# Patient Record
Sex: MALE | Race: BLACK OR AFRICAN AMERICAN | Employment: UNEMPLOYED | URBAN - METROPOLITAN AREA
[De-identification: names, ages, dates, MRNs, and addresses within clinical notes are randomized per-mention and may not be internally consistent; named-entity substitution may affect disease eponyms.]

---

## 2017-08-02 DIAGNOSIS — I10 HTN (HYPERTENSION): ICD-10-CM

## 2017-08-08 RX ORDER — ISOSORBIDE MONONITRATE 60 MG/1
60 TABLET, EXTENDED RELEASE ORAL 2 TIMES DAILY
Qty: 180 TABLET | Refills: 3 | Status: SHIPPED | OUTPATIENT
Start: 2017-08-08 | End: 2018-01-01

## 2017-09-07 ENCOUNTER — CARE COORDINATION (OUTPATIENT)
Dept: CARDIOLOGY | Facility: CLINIC | Age: 57
End: 2017-09-07

## 2017-09-12 ENCOUNTER — TELEPHONE (OUTPATIENT)
Dept: CARDIOLOGY | Facility: CLINIC | Age: 57
End: 2017-09-12

## 2017-09-12 DIAGNOSIS — I10 ESSENTIAL HYPERTENSION: Primary | ICD-10-CM

## 2017-09-12 DIAGNOSIS — E78.5 HYPERLIPIDEMIA LDL GOAL <100: ICD-10-CM

## 2017-09-12 NOTE — TELEPHONE ENCOUNTER
Patient started your nurse was supposed to put in orders for labs for him to do before his appointment with you.  He would like to get them done soon.     Please contact the patient and let him know when the orders are placed in his chart.  Thank you!  Leena MORA  Central Scheduler

## 2017-09-19 ENCOUNTER — PRE VISIT (OUTPATIENT)
Dept: CARDIOLOGY | Facility: CLINIC | Age: 57
End: 2017-09-19

## 2017-09-19 ENCOUNTER — NURSE TRIAGE (OUTPATIENT)
Dept: NURSING | Facility: CLINIC | Age: 57
End: 2017-09-19

## 2017-09-19 DIAGNOSIS — E78.5 HYPERLIPIDEMIA LDL GOAL <100: ICD-10-CM

## 2017-09-19 DIAGNOSIS — I10 ESSENTIAL HYPERTENSION: ICD-10-CM

## 2017-09-19 LAB
ANION GAP SERPL CALCULATED.3IONS-SCNC: 15 MMOL/L (ref 3–14)
BUN SERPL-MCNC: 57 MG/DL (ref 7–30)
CALCIUM SERPL-MCNC: 7.2 MG/DL (ref 8.5–10.1)
CHLORIDE SERPL-SCNC: 102 MMOL/L (ref 94–109)
CHOLEST SERPL-MCNC: 165 MG/DL
CO2 SERPL-SCNC: 23 MMOL/L (ref 20–32)
CREAT SERPL-MCNC: 17.3 MG/DL (ref 0.66–1.25)
GFR SERPL CREATININE-BSD FRML MDRD: 3 ML/MIN/1.7M2
GLUCOSE SERPL-MCNC: 93 MG/DL (ref 70–99)
HDLC SERPL-MCNC: 36 MG/DL
LDLC SERPL CALC-MCNC: 85 MG/DL
NONHDLC SERPL-MCNC: 129 MG/DL
POTASSIUM SERPL-SCNC: 3.7 MMOL/L (ref 3.4–5.3)
SODIUM SERPL-SCNC: 140 MMOL/L (ref 133–144)
TRIGL SERPL-MCNC: 222 MG/DL

## 2017-09-19 PROCEDURE — 80048 BASIC METABOLIC PNL TOTAL CA: CPT | Performed by: NURSE PRACTITIONER

## 2017-09-19 PROCEDURE — 80061 LIPID PANEL: CPT | Performed by: NURSE PRACTITIONER

## 2017-09-19 PROCEDURE — 36415 COLL VENOUS BLD VENIPUNCTURE: CPT | Performed by: NURSE PRACTITIONER

## 2017-09-19 NOTE — TELEPHONE ENCOUNTER
Labs : 9/20/17  Prior to appt.  BMP/ Lipid profile.    Dobutamine stress echo : 1/18/16  Normal dobutamine echo     No angina was elicited. Heart rate and blood pressure response were adequate.  There were no wall motion abnormalities at rest or stress. LVEF mata from 60 to 70% and LV size decreased     Concentric LVH noted

## 2017-09-19 NOTE — TELEPHONE ENCOUNTER
Ron from [facility] calling to inform ordering provider of Critical lab result.  Patient with creatinine level of 17.3.  Writer attempted to page ordering provider Natalie Rodas NP via telephone pager at [phone].  Additionally, Smart Web text page was sent to [facility] on call cardiology fellow at 1743. Received call back from Manuel Moore on call cardiology fellow at 1747.  No further orders/instructions were given to writer at this time.    Milagros Alcazar RN  [facility]

## 2017-09-20 ENCOUNTER — OFFICE VISIT (OUTPATIENT)
Dept: CARDIOLOGY | Facility: CLINIC | Age: 57
End: 2017-09-20
Attending: NURSE PRACTITIONER
Payer: MEDICAID

## 2017-09-20 VITALS
WEIGHT: 217.8 LBS | OXYGEN SATURATION: 100 % | HEIGHT: 68 IN | SYSTOLIC BLOOD PRESSURE: 135 MMHG | BODY MASS INDEX: 33.01 KG/M2 | DIASTOLIC BLOOD PRESSURE: 85 MMHG | HEART RATE: 75 BPM

## 2017-09-20 DIAGNOSIS — R06.02 SHORTNESS OF BREATH: ICD-10-CM

## 2017-09-20 DIAGNOSIS — R05.9 COUGH: ICD-10-CM

## 2017-09-20 DIAGNOSIS — I15.1 HYPERTENSION SECONDARY TO OTHER RENAL DISORDERS: ICD-10-CM

## 2017-09-20 DIAGNOSIS — E78.1 HYPERTRIGLYCERIDEMIA: Primary | ICD-10-CM

## 2017-09-20 PROCEDURE — 99212 OFFICE O/P EST SF 10 MIN: CPT | Mod: ZF

## 2017-09-20 PROCEDURE — 99213 OFFICE O/P EST LOW 20 MIN: CPT | Mod: ZP | Performed by: NURSE PRACTITIONER

## 2017-09-20 RX ORDER — CARVEDILOL 12.5 MG/1
25 TABLET ORAL DAILY
Qty: 180 TABLET | Refills: 3 | COMMUNITY
Start: 2017-09-20

## 2017-09-20 RX ORDER — TRIAMCINOLONE ACETONIDE 0.25 MG/G
CREAM TOPICAL 2 TIMES DAILY
COMMUNITY
End: 2018-07-06

## 2017-09-20 RX ORDER — LANOLIN ALCOHOL/MO/W.PET/CERES
500 CREAM (GRAM) TOPICAL AT BEDTIME
Refills: 3 | COMMUNITY
Start: 2017-09-20

## 2017-09-20 RX ORDER — CICLOPIROX 80 MG/ML
SOLUTION TOPICAL
COMMUNITY

## 2017-09-20 ASSESSMENT — PAIN SCALES - GENERAL: PAINLEVEL: NO PAIN (0)

## 2017-09-20 NOTE — PROGRESS NOTES
Cardiology Clinic Note  September 20, 2017      HPI:  Rommel Fan is a 57 year old with a past medical history significant for hypertension,  dyslipidemia/hypertriglyceridemia and end-stage renal disease on peritoneal dialysis for the past year who presents for follow-up.     Mr. Fan is feeling well. He is not particularly active due to arthritis in his back and hips. He is able to walk at least 1 mile without chest pain, shortness of breath, palpitations, lightheadedness or syncope. He endorses stable 3 pillow orthopnea. He continues to have occasional leg cramps at rest and with activity. He recently underwent ABIs which were normal. He is currently taking Niacin and Zetia, no longer taking Lovaza. He tries to eat a heart healthy diet but states that he could do better. He uses CPAP for his MAGALIS.     Past medical history:  Past Medical History:   Diagnosis Date     Asthma      Chronic renal failure      COPD (chronic obstructive pulmonary disease) (H)      Gastrointestinal problem      Hepatitis C      Hyperlipidemia LDL goal <100 4/3/2013     Hypertension      Hypertriglyceridemia 7/13/2015     Liver disease      Obstructive sleep apnea      Sinus problem      Medications:    Current Outpatient Prescriptions on File Prior to Visit:  aspirin EC 81 MG tablet Take 1 tablet (81 mg) by mouth daily   isosorbide mononitrate (IMDUR) 60 MG 24 hr tablet Take 1 tablet (60 mg) by mouth 2 times daily DO NOT TAKE WITH SILDENAFIL (viagra)   Peritoneal Dialysis Solutions (DIANEAL PD-2.5%, CALCIUM 3.5 MEQ/L,) 396 MOSM/L SOLN 1,000 mLs by Dialysis route once for 1 dose   senna-docusate (YASMINE-COLACE) 8.6-50 MG per tablet Take 1-2 tablets by mouth 2 times daily as needed for constipation   Nutritional Supplements (NEPRO) LIQD Take 1 Can by mouth daily   [DISCONTINUED] carvedilol (COREG) 12.5 MG tablet Take 1 tablet (12.5 mg) by mouth 2 times daily (with meals)   albuterol (PROAIR HFA, PROVENTIL HFA, VENTOLIN HFA) 108 (90  BASE) MCG/ACT inhaler Inhale 2 puffs into the lungs every 6 hours as needed    Varenicline Tartrate (CHANTIX PO) Take 1 mg by mouth 2 times daily as needed    Colchicine (COLCRYS PO) Take 0.6 mg by mouth 2 times daily   Cholestyramine (QUESTRAN PO) Take by mouth 3 times daily   Budesonide-Formoterol Fumarate (SYMBICORT IN) Inhale 2 puffs into the lungs 2 times daily    Cholecalciferol (VITAMIN D3 PO) Take by mouth daily   Ezetimibe (ZETIA PO) Take 10 mg by mouth   Lidocaine HCl 2 % CREA Externally apply 1 dose topically daily   losartan (COZAAR) 100 MG tablet Take 1 tablet by mouth daily.   hydrALAZINE (APRESOLINE) 100 MG TABS Take 1 tablet by mouth 3 times daily.   amLODIPine (NORVASC) 10 MG tablet Take 1 tablet by mouth daily.   omeprazole (PRILOSEC) 40 MG capsule Take 1 capsule by mouth daily.     Current Facility-Administered Medications on File Prior to Visit:  hydrALAZINE (APRESOLINE) injection       Allergies:    No Known Allergies    Family and social history:    Family History   Problem Relation Age of Onset     Asthma Mother      DIABETES Mother      Hypertension Mother      CEREBROVASCULAR DISEASE Mother      CANCER Mother      Alcohol/Drug Mother      HEART DISEASE Mother      Asthma Sister      Asthma Sister      Asthma Sister      Asthma Brother      Asthma Brother      Asthma Brother      HEART DISEASE Sister      DIABETES Sister        Social History     Social History     Marital status: Single     Spouse name: Leonela     Number of children: 1     Years of education: N/A     Occupational History      Self Employed.     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  Respiratory: No cough or hemoptysis.  Cardiovascular: See HPI.    Gastrointestinal: No abdominal pain, nausea, vomiting, hematemesis or melena.  Genitourinary: No increased frequency or urgency of urine. No dysuria or hematuria.  Musculoskeletal: No polyarthralgia or myalgias.  Neurologic: No headaches, seizure or focal weakness.  Psychiatric: No hallucinations.  Hematologic/Lymphatic/Immunologic: No bleeding tendency.  Endocrine: No heat or cold intolerance, abnormal facial hair or alopecia.    Vital signs:  /85 (BP Location: Left arm, Patient Position: Chair, Cuff Size: Adult Regular)  Pulse 75  Ht 1.727 m (5' 8\")  Wt 98.8 kg (217 lb 12.8 oz)  SpO2 100%  BMI 33.12 kg/m2   Wt Readings from Last 2 Encounters:   09/20/17 98.8 kg (217 lb 12.8 oz)   04/07/16 103.6 kg (228 lb 8 oz)     Physical Exam:  Gen: NAD.    HEENT: No conjunctival pallor or scleral icterus, MMM. Clear oropharynx.    Neck: No JVD. No thyroid enlargement or cervical adenopathy.    Chest: Clear to auscultation bilaterally.    CV: Normal first and second heart sounds. No murmurs or gallop appreciated.    Abdomen: Soft, non-tender, non-distended, BS+.  Ext: Trace edema. Warm and well perfused with normal capillary refill.    Skin: No skin rash or ulcers.  Neuro: alert, oriented and appropriately conversant.    Psych: Normal affect and speech.    Labs:  Recent Labs   Lab Test  09/19/17   0952  04/07/16   1159   06/29/15   0957  11/14/13   0831   CHOL  165  201*   < >  215*  174   HDL  36*  25*   < >  27*  34*   LDL  85  Cannot estimate LDL when triglyceride exceeds 400 mg/dL  72   < >  Cannot estimate LDL when triglyceride exceeds 400 mg/dL  71  Cannot estimate LDL when triglyceride exceeds 400 mg/dL  55   TRIG  222*  528*   < >  779*  517*   CHOLHDLRATIO   --    --    --   8.0*  5.1*    < > = values in this interval not displayed.     Diagnostics:    Dobutamine stress echo " 1/2016:    Interpretation Summary  Normal dobutamine echo     No angina was elicited. Heart rate and blood pressure response were adequate.  There were no wall motion abnormalities at rest or stress. LVEF mata from 60  to 70% and LV size decreased     Concentric LVH noted  ______________________________________________________________________________     Stress  The maximum dose of dobutamine was 50mcg/kg/min.  The maximum dose of atropine was 1mg.  The maximum dose of metoprolol was 4mg.  Patient was given 6ml mixture of 1.5ml Definity and 8.5ml saline.  normal BP response to dobutamine. SOB with infusion.  The drug infusion was stopped due to target heart rate achieved.  The EKG portion of this stress test was negative for inducible ischemia (see  echo results below).  This was a normal stress echocardiogram.     Baseline  Normal baseline electrocardiogram.  The patient is in normal sinus rhythm.  Occasional premature atrial contractions.  The resting phase of the study was normal.  No regional wall motion abnormalities noted.  There is concentric left ventricular hypoertrophy.  Stress Results                                       Maximum Predicted HR:  166 bpm            Target HR: 141 bpm        % Maximum Predicted HR:  86 %                      +--------+--------+----------+------+----+                   :        :Duration:Heart Rate:      :    :                   :  Stage : (mm:ss):   (bpm)  :  BPDos   e:                   +--------+--------+----------+------+----+                   :BASELINE:  0:00 71          :151/98:0.00:                   +--------+--------+----------+------+----+                   :  PEAK  :  12:00 1     42   :174/88:1.00:                   +--------+--------+----------+------+----+                        Stress Duration:  12:00 mm:ss *                    Maximum Stress HR:   142 bpm *        Left Ventricle  The left ventricle is normal in size. There is mild to moderate concentric  left  ventricular hypertrophy. The visual ejection fraction is estimated at 60  -65%.     Right Ventricle  The right ventricle is normal in structure, function and size.     Atria  Normal left atrial size.     Mitral Valve  The mitral valve is normal in structure and function.     Tricuspid Valve  The tricuspid valve is normal in structure and function.  Aortic Valve  The aortic valve is normal in structure and function.     Pericardium  There is no pericardial effusion.     Procedure  Dobutamine Echo Complete. Contrast Optison.    Assessment and Plan:  Rommel Fan is a 57 year old with a past medical history significant for hypertension,  dyslipidemia/hypertriglyceridemia and end-stage renal disease on peritoneal dialysis for the past year who presents for follow-up. Feeling well without cardiac symptoms. He is euvolemic today. No other significant exam findings.      1. Hypertension: Blood pressure well controlled. 130's/80's today, run in this range at home.  -- Continue losartan 100 mg daily, hydralazine 100 mg TID, amlodipine 10 mg daily and carvedilol 25 mg BID  2. Dyslipidemia: Triglycerides and total cholesterol have come down. HDL has increased. LDL 85 today.  -- Continue Niacin  mg daily and Zetia 10 mg daily  -- Continue to eat a heart healthy diet  -- Increase activity level as able    Follow-up: RTC to see Dr. Lloyd in 6 month.s

## 2017-09-20 NOTE — NURSING NOTE
Chief Complaint   Patient presents with     New Patient     58 y/o male for annual follow up of hypertension and hyperlipidemia.     Vitals were taken and medications were reconciled.    Adriane Mireles MA    1:59 PM

## 2017-09-20 NOTE — LETTER
9/20/2017      RE: Rommel Fan  [address] [place] [address]       Dear Colleague,    Thank you for the opportunity to participate in the care of your patient, Rommel Fan, at the [facility] HEART [facility] at [facility]. Please see a copy of my visit note below.    Cardiology Clinic Note  September 20, 2017      HPI:  Rommel Fan is a 57 year old with a past medical history significant for hypertension,  dyslipidemia/hypertriglyceridemia and end-stage renal disease on peritoneal dialysis for the past year who presents for follow-up.     Mr. Fan is feeling well. He is not particularly active due to arthritis in his back and hips. He is able to walk at least 1 mile without chest pain, shortness of breath, palpitations, lightheadedness or syncope. He endorses stable 3 pillow orthopnea. He continues to have occasional leg cramps at rest and with activity. He recently underwent ABIs which were normal. He is currently taking Niacin and Zetia, no longer taking Lovaza. He tries to eat a heart healthy diet but states that he could do better. He uses CPAP for his MAGALIS.     Past medical history:  Past Medical History:   Diagnosis Date     Asthma      Chronic renal failure      COPD (chronic obstructive pulmonary disease) (H)      Gastrointestinal problem      Hepatitis C      Hyperlipidemia LDL goal <100 4/3/2013     Hypertension      Hypertriglyceridemia 7/13/2015     Liver disease      Obstructive sleep apnea      Sinus problem      Medications:    Current Outpatient Prescriptions on File Prior to Visit:  aspirin EC 81 MG tablet Take 1 tablet (81 mg) by mouth daily   isosorbide mononitrate (IMDUR) 60 MG 24 hr tablet Take 1 tablet (60 mg) by mouth 2 times daily DO NOT TAKE WITH SILDENAFIL (viagra)   Peritoneal Dialysis Solutions (DIANEAL PD-2.5%, CALCIUM 3.5 MEQ/L,) 396 MOSM/L SOLN 1,000 mLs by Dialysis route once for 1 dose   senna-docusate (YASMINE-COLACE) 8.6-50 MG per  tablet Take 1-2 tablets by mouth 2 times daily as needed for constipation   Nutritional Supplements (NEPRO) LIQD Take 1 Can by mouth daily   [DISCONTINUED] carvedilol (COREG) 12.5 MG tablet Take 1 tablet (12.5 mg) by mouth 2 times daily (with meals)   albuterol (PROAIR HFA, PROVENTIL HFA, VENTOLIN HFA) 108 (90 BASE) MCG/ACT inhaler Inhale 2 puffs into the lungs every 6 hours as needed    Varenicline Tartrate (CHANTIX PO) Take 1 mg by mouth 2 times daily as needed    Colchicine (COLCRYS PO) Take 0.6 mg by mouth 2 times daily   Cholestyramine (QUESTRAN PO) Take by mouth 3 times daily   Budesonide-Formoterol Fumarate (SYMBICORT IN) Inhale 2 puffs into the lungs 2 times daily    Cholecalciferol (VITAMIN D3 PO) Take by mouth daily   Ezetimibe (ZETIA PO) Take 10 mg by mouth   Lidocaine HCl 2 % CREA Externally apply 1 dose topically daily   losartan (COZAAR) 100 MG tablet Take 1 tablet by mouth daily.   hydrALAZINE (APRESOLINE) 100 MG TABS Take 1 tablet by mouth 3 times daily.   amLODIPine (NORVASC) 10 MG tablet Take 1 tablet by mouth daily.   omeprazole (PRILOSEC) 40 MG capsule Take 1 capsule by mouth daily.     Current Facility-Administered Medications on File Prior to Visit:  hydrALAZINE (APRESOLINE) injection       Allergies:    No Known Allergies    Family and social history:    Family History   Problem Relation Age of Onset     Asthma Mother      DIABETES Mother      Hypertension Mother      CEREBROVASCULAR DISEASE Mother      CANCER Mother      Alcohol/Drug Mother      HEART DISEASE Mother      Asthma Sister      Asthma Sister      Asthma Sister      Asthma Brother      Asthma Brother      Asthma Brother      HEART DISEASE Sister      DIABETES Sister        Social History     Social History     Marital status: Single     Spouse name: Leonela     Number of children: 1     Years of education: N/A     Occupational History      Self Employed.     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  Respiratory: No cough or hemoptysis.  Cardiovascular: See HPI.    Gastrointestinal: No abdominal pain, nausea, vomiting, hematemesis or melena.  Genitourinary: No increased frequency or urgency of urine. No dysuria or hematuria.  Musculoskeletal: No polyarthralgia or myalgias.  Neurologic: No headaches, seizure or focal weakness.  Psychiatric: No hallucinations.  Hematologic/Lymphatic/Immunologic: No bleeding tendency.  Endocrine: No heat or cold intolerance, abnormal facial hair or alopecia.    Vital signs:  /85 (BP Location: Left arm, Patient Position: Chair, Cuff Size: Adult Regular)  Pulse 75  Ht 1.727 m (5' 8\")  Wt 98.8 kg (217 lb 12.8 oz)  SpO2 100%  BMI 33.12 kg/m2   Wt Readings from Last 2 Encounters:   09/20/17 98.8 kg (217 lb 12.8 oz)   04/07/16 103.6 kg (228 lb 8 oz)     Physical Exam:  Gen: NAD.    HEENT: No conjunctival pallor or scleral icterus, MMM. Clear oropharynx.    Neck: No JVD. No thyroid enlargement or cervical adenopathy.    Chest: Clear to auscultation bilaterally.    CV: Normal first and second heart sounds. No murmurs or gallop appreciated.    Abdomen: Soft, non-tender, non-distended, BS+.  Ext: Trace edema. Warm and well perfused with normal capillary refill.    Skin: No skin rash or ulcers.  Neuro: alert, oriented and appropriately conversant.    Psych: Normal affect and speech.    Labs:  Recent Labs   Lab Test  09/19/17   0952  04/07/16   1159   06/29/15   0957  11/14/13   0831   CHOL  165  201*   < >  215*  174   HDL  36*  25*   < >  27*  34*   LDL  85  Cannot estimate LDL when triglyceride exceeds 400 mg/dL  72 "   < >  Cannot estimate LDL when triglyceride exceeds 400 mg/dL  71  Cannot estimate LDL when triglyceride exceeds 400 mg/dL  55   TRIG  222*  528*   < >  779*  517*   CHOLHDLRATIO   --    --    --   8.0*  5.1*    < > = values in this interval not displayed.     Diagnostics:    Dobutamine stress echo 1/2016:    Interpretation Summary  Normal dobutamine echo     No angina was elicited. Heart rate and blood pressure response were adequate.  There were no wall motion abnormalities at rest or stress. LVEF mata from 60  to 70% and LV size decreased     Concentric LVH noted  ______________________________________________________________________________     Stress  The maximum dose of dobutamine was 50mcg/kg/min.  The maximum dose of atropine was 1mg.  The maximum dose of metoprolol was 4mg.  Patient was given 6ml mixture of 1.5ml Definity and 8.5ml saline.  normal BP response to dobutamine. SOB with infusion.  The drug infusion was stopped due to target heart rate achieved.  The EKG portion of this stress test was negative for inducible ischemia (see  echo results below).  This was a normal stress echocardiogram.     Baseline  Normal baseline electrocardiogram.  The patient is in normal sinus rhythm.  Occasional premature atrial contractions.  The resting phase of the study was normal.  No regional wall motion abnormalities noted.  There is concentric left ventricular hypoertrophy.  Stress Results                                       Maximum Predicted HR:  166 bpm            Target HR: 141 bpm        % Maximum Predicted HR:  86 %                      +--------+--------+----------+------+----+                   :        :Duration:Heart Rate:      :    :                   :  Stage : (mm:ss):   (bpm)  :  BPDos   e:                   +--------+--------+----------+------+----+                   :BASELINE:  0:00 71          :151/98:0.00:                   +--------+--------+----------+------+----+                   :  PEAK  :   12:00 1     42   :174/88:1.00:                   +--------+--------+----------+------+----+                        Stress Duration:  12:00 mm:ss *                    Maximum Stress HR:   142 bpm *        Left Ventricle  The left ventricle is normal in size. There is mild to moderate concentric  left ventricular hypertrophy. The visual ejection fraction is estimated at 60  -65%.     Right Ventricle  The right ventricle is normal in structure, function and size.     Atria  Normal left atrial size.     Mitral Valve  The mitral valve is normal in structure and function.     Tricuspid Valve  The tricuspid valve is normal in structure and function.  Aortic Valve  The aortic valve is normal in structure and function.     Pericardium  There is no pericardial effusion.     Procedure  Dobutamine Echo Complete. Contrast Optison.    Assessment and Plan:  Rommel Fan is a 57 year old with a past medical history significant for hypertension,  dyslipidemia/hypertriglyceridemia and end-stage renal disease on peritoneal dialysis for the past year who presents for follow-up. Feeling well without cardiac symptoms. He is euvolemic today. No other significant exam findings.      1. Hypertension: Blood pressure well controlled. 130's/80's today, run in this range at home.  -- Continue losartan 100 mg daily, hydralazine 100 mg TID, amlodipine 10 mg daily and carvedilol 25 mg BID  2. Dyslipidemia: Triglycerides and total cholesterol have come down. HDL has increased. LDL 85 today.  -- Continue Niacin  mg daily and Zetia 10 mg daily  -- Continue to eat a heart healthy diet  -- Increase activity level as able    Follow-up: RTC to see Dr. Lloyd in 6 month.s    Please do not hesitate to contact me if you have any questions/concerns.     Sincerely,     Natalie Rodas NP

## 2017-09-20 NOTE — PATIENT INSTRUCTIONS
You were seen today in the Cardiovascular Clinic at the [facility].    Cardiology provider you saw during your visit Natalie Rodas NP.    1. Your labs and vitals are good today.  2. Continue current medications.  3. Please make a follow-up appointment with Dr. Lloyd in 6 months with labs prior.    Questions and [phone].   First press #1 for the University and then press #3 for Medical Questions to reach the Cardiology triage nurse.     On Call Cardiologist for after hours or on weekends: [phone], press option #4 and ask to speak to the on-call Cardiologist.

## 2017-09-20 NOTE — MR AVS SNAPSHOT
After Visit Summary   2017    Rommel Fan    MRN: [id number]           Patient Information     Date Of Birth          1960        Visit Information        Provider Department      2017 2:00 PM Natalie Rodas NP [facility]        Today's Diagnoses     Hypertriglyceridemia    -  1    Hypertension secondary to other renal disorders        Cough        Shortness of breath          Care Instructions    You were seen today in the Cardiovascular Clinic at the [facility].    Cardiology provider you saw during your visit Natalie Rodas NP.    1. Your labs and vitals are good today.  2. Continue current medications.  3. Please make a follow-up appointment with Dr. Lloyd in 6 months with labs prior.    Questions and [phone].   First press #1 for the University and then press #3 for Medical Questions to reach the Cardiology triage nurse.     On Call Cardiologist for after hours or on weekends: [phone], press option #4 and ask to speak to the on-call Cardiologist.             Follow-ups after your visit        Follow-up notes from your care team     See patient instructions section of the AVS Return if symptoms worsen or fail to improve, for Natalie Rodas CNP.      Future tests that were ordered for you today     Open Future Orders        Priority Expected Expires Ordered    **Lipid panel reflex to direct LDL FUTURE anytime Routine 2018 3/20/2018 2017            Who to contact     If you have questions or need follow up information about today's clinic visit or your schedule please contact [facility] directly at [phone].  Normal or non-critical lab and imaging results will be communicated to you by MyChart, letter or phone within 4 business days after the clinic has received the results. If you do not hear from us within 7 days, please contact the clinic through MyChart or phone. If you have a critical or abnormal lab  "result, we will notify you by phone as soon as possible.  Submit refill requests through [employer] or call your pharmacy and they will forward the refill request to us. Please allow 3 business days for your refill to be completed.          Additional Information About Your Visit        [email or web address] Information     [employer] gives you secure access to your electronic health record. If you see a primary care provider, you can also send messages to your care team and make appointments. If you have questions, please call your primary care clinic.  If you do not have a primary care provider, please call [phone] and they will assist you.        Care EveryWhere ID     This is your Care EveryWhere ID. This could be used by other organizations to access your [place] medical records  [id number]        Your Vitals Were     Pulse Height Pulse Oximetry BMI (Body Mass Index)          75 1.727 m (5' 8\") 100% 33.12 kg/m2         Blood Pressure from Last 3 Encounters:   09/20/17 135/85   04/07/16 133/86   04/04/16 137/78    Weight from Last 3 Encounters:   09/20/17 98.8 kg (217 lb 12.8 oz)   04/07/16 103.6 kg (228 lb 8 oz)   03/16/16 103.5 kg (228 lb 2.8 oz)                 Today's Medication Changes          These changes are accurate as of: 9/20/17  3:20 PM.  If you have any questions, ask your nurse or doctor.               These medicines have changed or have updated prescriptions.        Dose/Directions    carvedilol 12.5 MG tablet   Commonly known as:  COREG   This may have changed:  how much to take   Used for:  Hypertension secondary to other renal disorders, Cough, Shortness of breath   Changed by:  Natalie Rodas NP        Dose:  25 mg   Take 2 tablets (25 mg) by mouth 2 times daily (with meals)   Quantity:  180 tablet   Refills:  3         Stop taking these medicines if you haven't already. Please contact your care team if you have questions.     omega-3 acid ethyl esters 1 G capsule   Commonly known as:  Lovaza "   Stopped by:  Natalie Rodas NP                    Primary Care Provider Office Phone # Fax #    Chris Barraza [phone]       [facility] [address]  [place] [facility] [address]        Equal Access to Services     SONYA BONILLA : Hadii aad ku hadasho Soomaali, waaxda luqadaha, qaybta kaalmada adeegyada, waxay idiin hayaan adegrace khsamara laRubinaceleste mendez. So [facility] [phone].    ATENCIÓN: Si habla español, tiene a das disposición servicios gratuitos de asistencia lingüística. Llame al [phone].    We comply with applicable federal civil rights laws and [place] laws. We do not discriminate on the basis of race, color, national origin, age, disability sex, sexual orientation or gender identity.            Thank you!     Thank you for choosing [facility]  for your care. Our goal is always to provide you with excellent care. Hearing back from our patients is one way we can continue to improve our services. Please take a few minutes to complete the written survey that you may receive in the mail after your visit with us. Thank you!             Your Updated Medication List - Protect others around you: Learn how to safely use, store and throw away your medicines at www.disposemymeds.org.          This list is accurate as of: 9/20/17  3:20 PM.  Always use your most recent med list.                   Brand Name Dispense Instructions for use Diagnosis    albuterol 108 (90 BASE) MCG/ACT Inhaler    PROAIR HFA/PROVENTIL HFA/VENTOLIN HFA     Inhale 2 puffs into the lungs every 6 hours as needed        amLODIPine 10 MG tablet    NORVASC     Take 1 tablet by mouth daily.        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 renal disorders       carvedilol 12.5 MG tablet    COREG    180 tablet    Take 2 tablets (25 mg) by mouth 2 times daily (with meals)    Hypertension  secondary to other renal disorders, Cough, Shortness of breath       CHANTIX PO      Take 1 mg by mouth 2 times daily as needed        COLCRYS PO      Take 0.6 mg by mouth 2 times daily        dianeal PD-2.5% dex (calcium 3.5 mEq/L) 396 MOSM/L Soln CAPD     1000 mL    1,000 mLs by Dialysis route once for 1 dose    PD catheter dysfunction, initial encounter (H), ESRD (end stage renal disease) (H)       hydrALAZINE 100 MG Tabs tablet    APRESOLINE    90 tablet    Take 1 tablet by mouth 3 times daily.    Hypertension       isosorbide mononitrate 60 MG 24 hr tablet    IMDUR    180 tablet    Take 1 tablet (60 mg) by mouth 2 times daily DO NOT TAKE WITH SILDENAFIL (viagra)    HTN (hypertension)       Lidocaine HCl 2 % Crea     60 mL    Externally apply 1 dose topically daily    Pain in limb, Plantar fascial fibromatosis       losartan 100 MG tablet    COZAAR    30 tablet    Take 1 tablet by mouth daily.    Hypertension       NEPRO Liqd     90 each    Take 1 Can by mouth daily    CKD (chronic kidney disease) stage 5, GFR less than 15 ml/min (H), Protein-calorie malnutrition (H)       niacin 500 MG CR tablet    SLO-NIACIN     Take 1 tablet (500 mg) by mouth At Bedtime    Hypertriglyceridemia       omeprazole 40 MG capsule    priLOSEC     Take 1 capsule by mouth daily.        QUESTRAN PO      Take by mouth 3 times daily        senna-docusate 8.6-50 MG per tablet    YASMINE-COLACE    50 tablet    Take 1-2 tablets by mouth 2 times daily as needed for constipation    Peritoneal dialysis status (H)       SYMBICORT IN      Inhale 2 puffs into the lungs 2 times daily        VITAMIN D3 PO      Take by mouth daily        ZETIA PO      Take 10 mg by mouth

## 2017-12-19 DIAGNOSIS — B18.2 CHRONIC HEPATITIS C WITHOUT HEPATIC COMA (H): Primary | ICD-10-CM

## 2017-12-21 ENCOUNTER — TELEPHONE (OUTPATIENT)
Dept: GASTROENTEROLOGY | Facility: CLINIC | Age: 57
End: 2017-12-21

## 2017-12-21 DIAGNOSIS — B18.2 CHRONIC HEPATITIS C WITHOUT HEPATIC COMA (H): ICD-10-CM

## 2017-12-21 LAB
ALBUMIN SERPL-MCNC: 3.6 G/DL (ref 3.4–5)
ALP SERPL-CCNC: 56 U/L (ref 40–150)
ALT SERPL W P-5'-P-CCNC: 24 U/L (ref 0–70)
ANION GAP SERPL CALCULATED.3IONS-SCNC: 13 MMOL/L (ref 3–14)
AST SERPL W P-5'-P-CCNC: <3 U/L (ref 0–45)
BILIRUB DIRECT SERPL-MCNC: 0.1 MG/DL (ref 0–0.2)
BILIRUB SERPL-MCNC: 0.4 MG/DL (ref 0.2–1.3)
BUN SERPL-MCNC: 72 MG/DL (ref 7–30)
CALCIUM SERPL-MCNC: 7.5 MG/DL (ref 8.5–10.1)
CHLORIDE SERPL-SCNC: 106 MMOL/L (ref 94–109)
CO2 SERPL-SCNC: 21 MMOL/L (ref 20–32)
CREAT SERPL-MCNC: 17.3 MG/DL (ref 0.66–1.25)
ERYTHROCYTE [DISTWIDTH] IN BLOOD BY AUTOMATED COUNT: 14.7 % (ref 10–15)
GFR SERPL CREATININE-BSD FRML MDRD: 3 ML/MIN/1.7M2
GLUCOSE SERPL-MCNC: 99 MG/DL (ref 70–99)
HCT VFR BLD AUTO: 30 % (ref 40–53)
HGB BLD-MCNC: 9.7 G/DL (ref 13.3–17.7)
INR PPP: 1.05 (ref 0.86–1.14)
MCH RBC QN AUTO: 32.9 PG (ref 26.5–33)
MCHC RBC AUTO-ENTMCNC: 32.3 G/DL (ref 31.5–36.5)
MCV RBC AUTO: 102 FL (ref 78–100)
PLATELET # BLD AUTO: 205 10E9/L (ref 150–450)
POTASSIUM SERPL-SCNC: 4.6 MMOL/L (ref 3.4–5.3)
PROT SERPL-MCNC: 7.6 G/DL (ref 6.8–8.8)
RBC # BLD AUTO: 2.95 10E12/L (ref 4.4–5.9)
SODIUM SERPL-SCNC: 140 MMOL/L (ref 133–144)
WBC # BLD AUTO: 5.2 10E9/L (ref 4–11)

## 2017-12-21 PROCEDURE — 87522 HEPATITIS C REVRS TRNSCRPJ: CPT | Performed by: INTERNAL MEDICINE

## 2017-12-21 PROCEDURE — 80076 HEPATIC FUNCTION PANEL: CPT | Performed by: INTERNAL MEDICINE

## 2017-12-21 PROCEDURE — 85610 PROTHROMBIN TIME: CPT | Performed by: INTERNAL MEDICINE

## 2017-12-21 PROCEDURE — 85027 COMPLETE CBC AUTOMATED: CPT | Performed by: INTERNAL MEDICINE

## 2017-12-21 PROCEDURE — 36415 COLL VENOUS BLD VENIPUNCTURE: CPT | Performed by: INTERNAL MEDICINE

## 2017-12-21 PROCEDURE — 80048 BASIC METABOLIC PNL TOTAL CA: CPT | Performed by: INTERNAL MEDICINE

## 2017-12-21 NOTE — PROGRESS NOTES
Connected with patient who verified he would be able to take appointment 12/29/17 with Dr. Menendez.  Patient plans on having labs drawn at local  prior to appointment.

## 2017-12-22 NOTE — TELEPHONE ENCOUNTER
I was paged about a critical value of a creatinine of 17 for Mr. Rommel Fan.  His creatinine had been elevated to that level at last check and he is on peritoneal dialysis.  I was unable to reach him but left voice message at his home phone and mobile phone.  His emergency contact and business phone number are not in service.  He does not have any other abnormalities regarding electrolytes or acid-base status, so I do not believe this is urgent tonight and will not call for a welfare check tonight, but will have clinic staff follow-up in the morning.    Wes Guerra MD  GI Fellow  [phone]

## 2017-12-24 LAB
HCV RNA SERPL NAA+PROBE-ACNC: ABNORMAL [IU]/ML
HCV RNA SERPL NAA+PROBE-LOG IU: 5.9 LOG IU/ML

## 2017-12-29 ENCOUNTER — OFFICE VISIT (OUTPATIENT)
Dept: GASTROENTEROLOGY | Facility: CLINIC | Age: 57
End: 2017-12-29
Attending: INTERNAL MEDICINE
Payer: MEDICAID

## 2017-12-29 VITALS
OXYGEN SATURATION: 99 % | WEIGHT: 220.2 LBS | SYSTOLIC BLOOD PRESSURE: 138 MMHG | HEART RATE: 68 BPM | TEMPERATURE: 97.6 F | HEIGHT: 68 IN | BODY MASS INDEX: 33.37 KG/M2 | DIASTOLIC BLOOD PRESSURE: 81 MMHG

## 2017-12-29 DIAGNOSIS — B18.2 CHRONIC HEPATITIS C WITHOUT HEPATIC COMA (H): ICD-10-CM

## 2017-12-29 DIAGNOSIS — R19.7 DIARRHEA, UNSPECIFIED TYPE: Primary | ICD-10-CM

## 2017-12-29 PROCEDURE — 99212 OFFICE O/P EST SF 10 MIN: CPT | Mod: ZF

## 2017-12-29 RX ORDER — LOPERAMIDE HYDROCHLORIDE 2 MG/1
2 TABLET ORAL 3 TIMES DAILY PRN
Qty: 30 TABLET | Refills: 3 | Status: SHIPPED | OUTPATIENT
Start: 2017-12-29 | End: 2018-05-29

## 2017-12-29 ASSESSMENT — PAIN SCALES - GENERAL: PAINLEVEL: NO PAIN (0)

## 2017-12-29 NOTE — NURSING NOTE
Chief Complaint   Patient presents with     Consult     Hepatitis C   Pt roomed, vitals, meds, and allergies reviewed with pt. Pt ready for provider.  Finn Maurice, CMA

## 2017-12-29 NOTE — MR AVS SNAPSHOT
After Visit Summary   12/29/2017    Rommel Fan    MRN: [id number]           Patient Information     Date Of Birth          1960        Visit Information        Provider Department      12/29/2017 11:00 AM Sanford Menendez MD [facility] Hepatology        Today's Diagnoses     Diarrhea, unspecified type    -  1    Chronic hepatitis C without hepatic coma (H)           Follow-ups after your visit        Follow-up notes from your care team     Return in about 3 months (around 3/29/2018).      Your next 10 appointments already scheduled     Mar 22, 2018 12:30 PM CDT   Lab with  LAB   [facility] Lab ([facility])    [address] [place] [address]  [address] [facility] [address]   [phone]            Mar 22, 2018  1:00 PM CDT   (Arrive by 12:45 PM)   RETURN LIPID VISIT with Cody Lloyd MD   [facility] Heart Care ([facility])    [address] [place] [address]  [address] [facility] [address] [phone]            Mar 27, 2018 10:15 AM CDT   (Arrive by 10:00 AM)   Return General Liver with Sanford Menendez MD   [facility] Hepatology ([facility])    [address] [place] [address]  [address] [facility] [id number] [phone]            Apr 27, 2018 10:00 AM CDT   (Arrive by 9:45 AM)   New Patient Visit with Wes Guerra   [facility] Gastroenterology and IBD Clinic ([facility])    [address] [place] [address]  [address] [facility] [id number] [phone]              Who to contact     If you have questions or need follow up information about today's clinic visit or your schedule please contact [facility] HEPATOLOG[facility] directly at [phone].  Normal or non-critical lab and imaging results will be communicated to you by MyChart, letter or phone within 4 business days after the clinic has received the results. If you do not hear from us within 7 days, please contact the clinic through MyChart or phone. If you have a  "critical or abnormal lab result, we will notify you by phone as soon as possible.  Submit refill requests through [employer] or call your pharmacy and they will forward the refill request to us. Please allow 3 business days for your refill to be completed.          Additional Information About Your Visit        [email or web address] Information     [employer] gives you secure access to your electronic health record. If you see a primary care provider, you can also send messages to your care team and make appointments. If you have questions, please call your primary care clinic.  If you do not have a primary care provider, please call [phone] and they will assist you.        Care EveryWhere ID     This is your Care EveryWhere ID. This could be used by other organizations to access your [place] medical records  [id number]        Your Vitals Were     Pulse Temperature Height Pulse Oximetry BMI (Body Mass Index)       68 97.6  F (36.4  C) (Oral) 1.727 m (5' 8\") 99% 33.48 kg/m2        Blood Pressure from Last 3 Encounters:   12/29/17 138/81   09/20/17 135/85   04/07/16 133/86    Weight from Last 3 Encounters:   12/29/17 99.9 kg (220 lb 3.2 oz)   09/20/17 98.8 kg (217 lb 12.8 oz)   04/07/16 103.6 kg (228 lb 8 oz)              Today, you had the following     No orders found for display         Today's Medication Changes          These changes are accurate as of: 12/29/17 12:20 PM.  If you have any questions, ask your nurse or doctor.               Start taking these medicines.        Dose/Directions    Glecaprevir-Pibrentasvir 100-40 MG Tabs   Commonly known as:  MAVYRET   Used for:  Chronic hepatitis C without hepatic coma (H)   Started by:  Sanford Menendez MD        Dose:  1 capsule   Take 1 capsule by mouth daily   Quantity:  28 tablet   Refills:  1       loperamide 2 MG tablet   Commonly known as:  IMODIUM A-D   Used for:  Diarrhea, unspecified type   Started by:  Sanford Menendez MD        Dose:  2 mg   Take 1 tablet (2 mg) by mouth " 3 times daily as needed Take 2mg (1 tablet) 30 minutes before eating.   Quantity:  30 tablet   Refills:  3       psyllium 0.52 G capsule   Used for:  Diarrhea, unspecified type   Started by:  Sanford Menendez MD        Dose:  1 capsule   Take 1 capsule (0.52 g) by mouth daily   Quantity:  540 capsule   Refills:  0            Where to get your medicines      These medications were sent to [place] [facility] - [facility] [address]  [address] [facility] [address]    Hours:  Mon-Fri 8:30am-5:00pm Toll Free [phone] Phone:  [phone]     Glecaprevir-Pibrentasvir 100-40 MG Tabs         These medications were sent to [facility] - [facility] [address] [address]     Phone:  [phone]     loperamide 2 MG tablet    psyllium 0.52 G capsule                Primary Care Provider Office Phone # Fax #    Chris Christi [phone]       [facility] [address]  [place] [facility] [address]        Equal Access to Services     MERVIN BONILLA AH: Hadii aad ku hadasho Soomaali, waaxda luqadaha, qaybta kaalmada adeegyada, waxay azin hayanjeln raine mendez. So [facility] [phone].    ATENCIÓN: Si habla español, tiene a das disposición servicios gratuitos de asistencia lingüística. [facility] [phone].    We comply with applicable federal civil rights laws and [place] laws. We do not discriminate on the basis of race, color, national origin, age, disability, sex, sexual orientation, or gender identity.            Thank you!     Thank you for choosing [facility] HEPATOLOGY  for your care. Our goal is always to provide you with excellent care. Hearing back from our patients is one way we can continue to improve our services. Please take a few minutes to complete the written survey that you may receive in the mail after your visit with us. Thank you!             Your Updated Medication List - Protect others  around you: Learn how to safely use, store and throw away your medicines at www.disposemymeds.org.          This list is accurate as of: 12/29/17 12:20 PM.  Always use your most recent med list.                   Brand Name Dispense Instructions for use Diagnosis    albuterol 108 (90 BASE) MCG/ACT Inhaler    PROAIR HFA/PROVENTIL HFA/VENTOLIN HFA     Inhale 2 puffs into the lungs every 6 hours as needed        ALLOPURINOL PO      Take 100 mg by mouth daily        amLODIPine 10 MG tablet    NORVASC     Take 1 tablet by mouth daily.        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 renal disorders       carvedilol 12.5 MG tablet    COREG    180 tablet    Take 25 mg by mouth daily    Hypertension secondary to other renal disorders, Cough, Shortness of breath       CHANTIX PO      Take 1 mg by mouth 2 times daily as needed        COLCRYS PO      Take 0.6 mg by mouth 2 times daily        DIALYVITE PO      Take by mouth daily        dianeal PD-2.5% dex (calcium 3.5 mEq/L) 396 MOSM/L Soln CAPD     1000 mL    1,000 mLs by Dialysis route once for 1 dose    PD catheter dysfunction, initial encounter (H), ESRD (end stage renal disease) (H)       FUROSEMIDE PO      Take 80 mg by mouth daily        GABAPENTIN PO      Take 100 mg by mouth At Bedtime        Glecaprevir-Pibrentasvir 100-40 MG Tabs    MAVYRET    28 tablet    Take 1 capsule by mouth daily    Chronic hepatitis C without hepatic coma (H)       hydrALAZINE 100 MG Tabs tablet    APRESOLINE    90 tablet    Take 1 tablet by mouth 3 times daily.    Hypertension       isosorbide mononitrate 60 MG 24 hr tablet    IMDUR    180 tablet    Take 1 tablet (60 mg) by mouth 2 times daily DO NOT TAKE WITH SILDENAFIL (viagra)    HTN (hypertension)       Lidocaine HCl 2 % Crea     60 mL    Externally apply 1 dose topically daily    Pain in limb, Plantar fascial fibromatosis        loperamide 2 MG tablet    IMODIUM A-D    30 tablet    Take 1 tablet (2 mg) by mouth 3 times daily as needed Take 2mg (1 tablet) 30 minutes before eating.    Diarrhea, unspecified type       losartan 100 MG tablet    COZAAR    30 tablet    Take 1 tablet by mouth daily.    Hypertension       MAGNESIUM OXIDE PO      Take 400 mg by mouth daily        MINOXIDIL PO      Take 2.5 mg by mouth daily        NEPRO Liqd     90 each    Take 1 Can by mouth daily    CKD (chronic kidney disease) stage 5, GFR less than 15 ml/min (H), Protein-calorie malnutrition (H)       niacin 500 MG CR tablet    SLO-NIACIN     Take 1 tablet (500 mg) by mouth At Bedtime    Hypertriglyceridemia       omeprazole 40 MG capsule    priLOSEC     Take 1 capsule by mouth as needed        ONDANSETRON PO      Take 4 mg by mouth 3 times daily        OTHER MEDICAL SUPPLIES      CPAP therapy        PENLAC 8 % Soln   Generic drug:  ciclopirox      Apply to adjacent skin and affected nails daily.  Remove with alcohol every 7 days, then repeat.        POLYETHYLENE GLYCOL EX           psyllium 0.52 G capsule     540 capsule    Take 1 capsule (0.52 g) by mouth daily    Diarrhea, unspecified type       QUESTRAN PO      Take by mouth as needed        senna-docusate 8.6-50 MG per tablet    YASMINE-COLACE    50 tablet    Take 1-2 tablets by mouth 2 times daily as needed for constipation    Peritoneal dialysis status (H)       SYMBICORT IN      Inhale 2 puffs into the lungs 2 times daily        TRAZODONE HCL PO      Take 100 mg by mouth daily        triamcinolone 0.025 % cream    KENALOG     Apply topically 2 times daily        * UNABLE TO FIND      1 tsp. MEDICATION NAME: calcium        * UNABLE TO FIND      MEDICATION NAME: Centamicin Cream prn        VITAMIN D3 PO      Take 50,000 Units by mouth daily        ZETIA PO      Take 10 mg by mouth        * Notice:  This list has 2 medication(s) that are the same as other medications prescribed for you. Read the  directions carefully, and ask your doctor or other care provider to review them with you.

## 2017-12-29 NOTE — LETTER
12/29/2017      RE: Rommel Fan  [address]   [place] [address]       HEPATOLOGY CONSULT    CC/REFERRING MD:  Chris Barraza    REASON FOR CONSULTATION:   The pt is a 57 year old male who I was asked to see in consultation at the request of Dr. Chris Barraza for hepatitis C.    ASSESSMENT/PLAN:  57-year-old man with end-stage renal disease requiring peritoneal dialysis secondary to hypertension who presents for consideration of treatment of genotype 1a hepatitis C.      He does not appear to have cirrhosis and his most recent liver panel was completely normal.  In addition, he has not been exposed to hepatitis B.  He is an excellent candidate for pan-genotypic antiviral glecaprevir/pibrentasvir (Mavyret) for 8 weeks duration.     He has been having roughly 6 years of postprandial diarrhea associated with urgency and occasional incontinence.  This is no worse than baseline at the moment he does not have any red flag symptoms such as fever, weight loss, hematochezia/melena.  He states he has had a number of tests done in the past at various different institutions.  Therefore, we will ask him to be seen in our luminal GI department after all of his records are collected.    -Mavryet x8 weeks  -Follow-up in 3 months  -Start loperamide and psyllium for diarrhea  -Luminal GI referral for diarrhea, requesting records    RTC 3 months    Patient was seen and discussed with attending, Dr. Menendez.    Thank you for this consultation.  It was a pleasure to participate in the care of this patient; please contact us with any further questions.      Wes Guerra MD  Fellow  Division of Gastroenterology, Hepatology and Nutrition  [facility]    The patient was seen and examined.  The above assessment and plan was developed jointly with the fellow.      Sanford Menendez MD      Professor of Medicine  [facility] [employer]      Executive Medical Director, Solid Organ Transplant  Program  [facility]     HPI  57-year-old man with end-stage renal disease requiring peritoneal dialysis secondary to hypertension who presents for consideration of treatment of genotype 1a hepatitis C.      He states he is currently feeling well.  He denies any issues with his liver other than the hepatitis C infection.  He has never been told that he has cirrhosis or any other liver disease.  His quit drinking alcohol for the past year.  He does not smoke.      He endorses 6 years of mainly postprandial diarrhea although will have occasional nocturnal incontinence.  He has not been able to identify any specific food in particular. He denies weight loss, abdominal pain, hematochezia, melena. He has been seen for this in the past but is not certain what had been done.  He had a colonoscopy 8/23/16 through [facility], which I am unable to see the results of.  He is not certain if he had random biopsies for microscopic colitis.    ROS:    No fevers or chills  No weight loss  No blurry vision, double vision or change in vision  No sore throat  No lymphadenopathy  No headache, paraesthesias, or weakness in a limb  No shortness of breath or wheezing  No chest pain or pressure  No arthralgias or myalgias  No rashes or skin changes  No odynophagia or dysphagia  No BRBPR, hematochezia, melena  No dysuria, frequency or urgency  No hot/cold intolerance or polyria  No anxiety or depression    PERTINENT PAST MEDICAL HISTORY:  Past Medical History:   Diagnosis Date     Asthma      Chronic renal failure      COPD (chronic obstructive pulmonary disease) (H)      Gastrointestinal problem      Hepatitis C      Hyperlipidemia LDL goal <100 4/3/2013     Hypertension      Hypertriglyceridemia 7/13/2015     Liver disease      Obstructive sleep apnea      Sinus problem      PREVIOUS SURGERIES:  Past Surgical History:   Procedure Laterality Date     CHOLECYSTECTOMY  2013     LAPAROSCOPIC INSERTION CATHETER  PERITONEAL DIALYSIS N/A 3/16/2016    Procedure: LAPAROSCOPIC INSERTION CATHETER PERITONEAL DIALYSIS;  Surgeon: Winnie Colmenares MD;  Location: [facility] OR     SEPTOPLASTY, TURBINOPLASTY, COMBINED  Nov 2008    inferior turbinate reduction and septoplasty due to deviation to right     TURBINOPLASTY  July 2008    bilateral inferior turbinate reduction     TURBINOPLASTY  1/27/2014    Procedure: TURBINOPLASTY;  Bilateral Turbinate Reduction ;  Surgeon: Cindy Carrasquillo MD;  Location: [facility] OR     PREVIOUS ENDOSCOPY:  Colonoscopy 8/23/16-we do not have the report    ALLERGIES:  No Known Allergies    PERTINENT MEDICATIONS:    Current Outpatient Prescriptions:      B Complex-C-Folic Acid (DIALYVITE PO), Take by mouth daily, Disp: , Rfl:      psyllium 0.52 G capsule, Take 1 capsule (0.52 g) by mouth daily, Disp: 540 capsule, Rfl: 0     loperamide (IMODIUM A-D) 2 MG tablet, Take 1 tablet (2 mg) by mouth 3 times daily as needed Take 2mg (1 tablet) 30 minutes before eating., Disp: 30 tablet, Rfl: 3     Glecaprevir-Pibrentasvir (MAVYRET) 100-40 MG TABS, Take 1 capsule by mouth daily, Disp: 28 tablet, Rfl: 1     carvedilol (COREG) 12.5 MG tablet, Take 25 mg by mouth daily , Disp: 180 tablet, Rfl: 3     niacin (SLO-NIACIN) 500 MG CR tablet, Take 1 tablet (500 mg) by mouth At Bedtime, Disp: , Rfl: 3     FUROSEMIDE PO, Take 80 mg by mouth daily, Disp: , Rfl:      MAGNESIUM OXIDE PO, Take 400 mg by mouth daily, Disp: , Rfl:      ALLOPURINOL PO, Take 100 mg by mouth daily, Disp: , Rfl:      TRAZODONE HCL PO, Take 100 mg by mouth daily, Disp: , Rfl:      ciclopirox (PENLAC) 8 % SOLN, Apply to adjacent skin and affected nails daily.  Remove with alcohol every 7 days, then repeat., Disp: , Rfl:      POLYETHYLENE GLYCOL EX, , Disp: , Rfl:      OTHER MEDICAL SUPPLIES, CPAP therapy, Disp: , Rfl:      ONDANSETRON PO, Take 4 mg by mouth 3 times daily, Disp: , Rfl:      triamcinolone (KENALOG) 0.025 % cream, Apply topically 2 times daily, Disp: , Rfl:       MINOXIDIL PO, Take 2.5 mg by mouth daily, Disp: , Rfl:      UNABLE TO FIND, 1 tsp. MEDICATION NAME: calcium, Disp: , Rfl:      UNABLE TO FIND, MEDICATION NAME: Centamicin Cream prn, Disp: , Rfl:      GABAPENTIN PO, Take 100 mg by mouth At Bedtime, Disp: , Rfl:      isosorbide mononitrate (IMDUR) 60 MG 24 hr tablet, Take 1 tablet (60 mg) by mouth 2 times daily DO NOT TAKE WITH SILDENAFIL (viagra), Disp: 180 tablet, Rfl: 3     senna-docusate (YASMINE-COLACE) 8.6-50 MG per tablet, Take 1-2 tablets by mouth 2 times daily as needed for constipation, Disp: 50 tablet, Rfl: 0     albuterol (PROAIR HFA, PROVENTIL HFA, VENTOLIN HFA) 108 (90 BASE) MCG/ACT inhaler, Inhale 2 puffs into the lungs every 6 hours as needed , Disp: , Rfl:      aspirin EC 81 MG tablet, Take 1 tablet (81 mg) by mouth daily, Disp: 91 tablet, Rfl: 3     Varenicline Tartrate (CHANTIX PO), Take 1 mg by mouth 2 times daily as needed , Disp: , Rfl:      Colchicine (COLCRYS PO), Take 0.6 mg by mouth 2 times daily, Disp: , Rfl:      Cholestyramine (QUESTRAN PO), Take by mouth as needed , Disp: , Rfl:      Budesonide-Formoterol Fumarate (SYMBICORT IN), Inhale 2 puffs into the lungs 2 times daily , Disp: , Rfl:      Cholecalciferol (VITAMIN D3 PO), Take 50,000 Units by mouth daily , Disp: , Rfl:      Ezetimibe (ZETIA PO), Take 10 mg by mouth, Disp: , Rfl:      Lidocaine HCl 2 % CREA, Externally apply 1 dose topically daily, Disp: 60 mL, Rfl: 0     losartan (COZAAR) 100 MG tablet, Take 1 tablet by mouth daily., Disp: 30 tablet, Rfl: 2     hydrALAZINE (APRESOLINE) 100 MG TABS, Take 1 tablet by mouth 3 times daily., Disp: 90 tablet, Rfl: 2     amLODIPine (NORVASC) 10 MG tablet, Take 1 tablet by mouth daily., Disp: , Rfl:      omeprazole (PRILOSEC) 40 MG capsule, Take 1 capsule by mouth as needed , Disp: , Rfl:      Peritoneal [employer]ialysis Solutions (DIANEAL PD-2.5%, CALCIUM 3.5 MEQ/L,) 396 MOSM/L SOLN, 1,000 mLs by Dialysis route once for 1 dose, Disp: 1000 mL, Rfl:  "0     Nutritional Supplements (NEPRO) LIQD, Take 1 Can by mouth daily (Patient not taking: Reported on 9/20/2017), Disp: 90 each, Rfl: 12  No current facility-administered medications for this visit.     Facility-Administered Medications Ordered in Other Visits:      hydrALAZINE (APRESOLINE) injection, , , PRN, Elzen, Jason V, APRN CRNA, 10 mg at 01/27/14 0923    SOCIAL HISTORY:  Social History     Social History     Marital status: Single     Spouse name: Leonela     Number of children: 1     Years of education: N/A     Occupational History      Self Employed.     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    PHYSICAL EXAMINATION:  Constitutional: aaox3, cooperative, pleasant, not dyspneic/diaphoretic, no acute distress  Vitals reviewed: /81  Pulse 68  Temp 97.6  F (36.4  C) (Oral)  Ht 1.727 m (5' 8\")  Wt 99.9 kg (220 lb 3.2 oz)  SpO2 99%  BMI 33.48 kg/m2  Wt:   Wt Readings from Last 2 Encounters:   12/29/17 99.9 kg (220 lb 3.2 oz)   09/20/17 98.8 kg (217 lb 12.8 oz)      Eyes: Sclera anicteric/injected  Ears/nose/mouth/throat: Normal oropharynx without ulcers or exudate, mucus " membranes moist, hearing intact  Neck: supple, thyroid normal size  CV: No edema  Respiratory: Unlabored breathing  Lymph: No cervical lymphadenopathy  Abd: +Peritoneal dialysis catheter, nondistended, +bs, no hepatosplenomegaly, nontender, no peritoneal signs  Skin: warm, perfused, no jaundice  Psych: Normal affect  MSK: Normal gait    PERTINENT STUDIES:  Orders Only on 12/21/2017   Component Date Value Ref Range Status     Bilirubin Direct 12/21/2017 0.1  0.0 - 0.2 mg/dL Final     Bilirubin Total 12/21/2017 0.4  0.2 - 1.3 mg/dL Final     Albumin 12/21/2017 3.6  3.4 - 5.0 g/dL Final     Protein Total 12/21/2017 7.6  6.8 - 8.8 g/dL Final     Alkaline Phosphatase 12/21/2017 56  40 - 150 U/L Final     ALT 12/21/2017 24  0 - 70 U/L Final     AST 12/21/2017 <3  0 - 45 U/L Final     Sodium 12/21/2017 140  133 - 144 mmol/L Final     Potassium 12/21/2017 4.6  3.4 - 5.3 mmol/L Final     Chloride 12/21/2017 106  94 - 109 mmol/L Final     Carbon Dioxide 12/21/2017 21  20 - 32 mmol/L Final     Anion Gap 12/21/2017 13  3 - 14 mmol/L Final     Glucose 12/21/2017 99  70 - 99 mg/dL Final     Urea Nitrogen 12/21/2017 72* 7 - 30 mg/dL Final     Creatinine 12/21/2017 17.30* 0.66 - 1.25 mg/dL Final     GFR Estimate 12/21/2017 3* >60 mL/min/1.7m2 Final     GFR Estimate If Black 12/21/2017 3* >60 mL/min/1.7m2 Final     Calcium 12/21/2017 7.5* 8.5 - 10.1 mg/dL Final     WBC 12/21/2017 5.2  4.0 - 11.0 10e9/L Final     RBC Count 12/21/2017 2.95* 4.4 - 5.9 10e12/L Final     Hemoglobin 12/21/2017 9.7* 13.3 - 17.7 g/dL Final     Hematocrit 12/21/2017 30.0* 40.0 - 53.0 % Final     MCV 12/21/2017 102* 78 - 100 fl Final     MCH 12/21/2017 32.9  26.5 - 33.0 pg Final     MCHC 12/21/2017 32.3  31.5 - 36.5 g/dL Final     RDW 12/21/2017 14.7  10.0 - 15.0 % Final     Platelet Count 12/21/2017 205  150 - 450 10e9/L Final     INR 12/21/2017 1.05  0.86 - 1.14 Final     HCV RNA Quant IU/ml 12/21/2017 201789* HCVND^HCV RNA Not Detected [IU]/mL Final      Log of HCV RNA Qt 12/21/2017 5.9* <1.2 Log IU/mL Final       Sanford Menendez MD

## 2018-01-01 ENCOUNTER — RADIANT APPOINTMENT (OUTPATIENT)
Dept: CT IMAGING | Facility: CLINIC | Age: 58
End: 2018-01-01
Attending: NURSE PRACTITIONER
Payer: MEDICAID

## 2018-01-01 ENCOUNTER — OFFICE VISIT (OUTPATIENT)
Dept: GASTROENTEROLOGY | Facility: CLINIC | Age: 58
End: 2018-01-01
Payer: MEDICAID

## 2018-01-01 ENCOUNTER — COMMITTEE REVIEW (OUTPATIENT)
Dept: TRANSPLANT | Facility: CLINIC | Age: 58
End: 2018-01-01

## 2018-01-01 ENCOUNTER — RESULTS ONLY (OUTPATIENT)
Dept: OTHER | Facility: CLINIC | Age: 58
End: 2018-01-01

## 2018-01-01 ENCOUNTER — TELEPHONE (OUTPATIENT)
Dept: GASTROENTEROLOGY | Facility: CLINIC | Age: 58
End: 2018-01-01

## 2018-01-01 ENCOUNTER — RADIANT APPOINTMENT (OUTPATIENT)
Dept: GENERAL RADIOLOGY | Facility: CLINIC | Age: 58
End: 2018-01-01
Attending: PHYSICIAN ASSISTANT
Payer: MEDICAID

## 2018-01-01 ENCOUNTER — RADIANT APPOINTMENT (OUTPATIENT)
Dept: ULTRASOUND IMAGING | Facility: CLINIC | Age: 58
End: 2018-01-01
Attending: PHYSICIAN ASSISTANT
Payer: MEDICAID

## 2018-01-01 ENCOUNTER — ALLIED HEALTH/NURSE VISIT (OUTPATIENT)
Dept: TRANSPLANT | Facility: CLINIC | Age: 58
End: 2018-01-01
Attending: PHYSICIAN ASSISTANT
Payer: MEDICAID

## 2018-01-01 ENCOUNTER — TELEPHONE (OUTPATIENT)
Dept: DERMATOLOGY | Facility: CLINIC | Age: 58
End: 2018-01-01

## 2018-01-01 ENCOUNTER — OFFICE VISIT (OUTPATIENT)
Dept: NEUROPSYCHOLOGY | Facility: CLINIC | Age: 58
End: 2018-01-01
Payer: MEDICAID

## 2018-01-01 ENCOUNTER — TELEPHONE (OUTPATIENT)
Dept: TRANSPLANT | Facility: CLINIC | Age: 58
End: 2018-01-01

## 2018-01-01 ENCOUNTER — OFFICE VISIT (OUTPATIENT)
Dept: DERMATOLOGY | Facility: CLINIC | Age: 58
End: 2018-01-01
Payer: MEDICAID

## 2018-01-01 ENCOUNTER — OFFICE VISIT (OUTPATIENT)
Dept: ORTHOPEDICS | Facility: CLINIC | Age: 58
End: 2018-01-01
Payer: MEDICAID

## 2018-01-01 ENCOUNTER — ALLIED HEALTH/NURSE VISIT (OUTPATIENT)
Dept: TRANSPLANT | Facility: CLINIC | Age: 58
End: 2018-01-01

## 2018-01-01 ENCOUNTER — RADIANT APPOINTMENT (OUTPATIENT)
Dept: CARDIOLOGY | Facility: CLINIC | Age: 58
End: 2018-01-01
Attending: PHYSICIAN ASSISTANT
Payer: MEDICAID

## 2018-01-01 ENCOUNTER — OFFICE VISIT (OUTPATIENT)
Dept: CARDIOLOGY | Facility: CLINIC | Age: 58
End: 2018-01-01
Attending: INTERNAL MEDICINE
Payer: MEDICAID

## 2018-01-01 VITALS
WEIGHT: 224.4 LBS | HEART RATE: 77 BPM | TEMPERATURE: 98.4 F | SYSTOLIC BLOOD PRESSURE: 115 MMHG | DIASTOLIC BLOOD PRESSURE: 66 MMHG | BODY MASS INDEX: 34.01 KG/M2 | HEIGHT: 68 IN | OXYGEN SATURATION: 98 %

## 2018-01-01 VITALS
HEART RATE: 71 BPM | WEIGHT: 201.2 LBS | OXYGEN SATURATION: 97 % | BODY MASS INDEX: 30.49 KG/M2 | TEMPERATURE: 97.8 F | SYSTOLIC BLOOD PRESSURE: 133 MMHG | DIASTOLIC BLOOD PRESSURE: 92 MMHG | HEIGHT: 68 IN

## 2018-01-01 VITALS
WEIGHT: 204 LBS | BODY MASS INDEX: 30.92 KG/M2 | SYSTOLIC BLOOD PRESSURE: 120 MMHG | OXYGEN SATURATION: 98 % | HEIGHT: 68 IN | DIASTOLIC BLOOD PRESSURE: 79 MMHG | HEART RATE: 77 BPM

## 2018-01-01 DIAGNOSIS — B18.2 HEPATITIS C VIRUS CARRIER STATE (H): ICD-10-CM

## 2018-01-01 DIAGNOSIS — I25.10 CARDIOVASCULAR DISEASE: ICD-10-CM

## 2018-01-01 DIAGNOSIS — G47.33 OBSTRUCTIVE SLEEP APNEA: ICD-10-CM

## 2018-01-01 DIAGNOSIS — K52.9 CHRONIC DIARRHEA: ICD-10-CM

## 2018-01-01 DIAGNOSIS — F54 PSYCHOLOGICAL FACTOR AFFECTING PHYSICAL CONDITION: Primary | ICD-10-CM

## 2018-01-01 DIAGNOSIS — Z87.891 HISTORY OF TOBACCO USE: ICD-10-CM

## 2018-01-01 DIAGNOSIS — J44.9 COPD (CHRONIC OBSTRUCTIVE PULMONARY DISEASE) (H): Primary | ICD-10-CM

## 2018-01-01 DIAGNOSIS — M20.42 HAMMERTOES OF BOTH FEET: ICD-10-CM

## 2018-01-01 DIAGNOSIS — N18.6 END STAGE RENAL DISEASE (H): ICD-10-CM

## 2018-01-01 DIAGNOSIS — Z76.82 AWAITING ORGAN TRANSPLANT: Primary | ICD-10-CM

## 2018-01-01 DIAGNOSIS — M20.41 HAMMERTOES OF BOTH FEET: ICD-10-CM

## 2018-01-01 DIAGNOSIS — L21.9 DERMATITIS, SEBORRHEIC: ICD-10-CM

## 2018-01-01 DIAGNOSIS — Z76.82 ORGAN TRANSPLANT CANDIDATE: ICD-10-CM

## 2018-01-01 DIAGNOSIS — Z01.818 PRE-TRANSPLANT EVALUATION FOR ESRD (END STAGE RENAL DISEASE): ICD-10-CM

## 2018-01-01 DIAGNOSIS — Z01.818 PRE-TRANSPLANT EVALUATION FOR KIDNEY TRANSPLANT: ICD-10-CM

## 2018-01-01 DIAGNOSIS — I73.9 BILATERAL CLAUDICATION OF LOWER LIMB (H): ICD-10-CM

## 2018-01-01 DIAGNOSIS — R19.7 DIARRHEA, UNSPECIFIED TYPE: ICD-10-CM

## 2018-01-01 DIAGNOSIS — B18.1 CHRONIC VIRAL HEPATITIS B WITHOUT DELTA AGENT AND WITHOUT COMA (H): ICD-10-CM

## 2018-01-01 DIAGNOSIS — L30.0 NUMMULAR ECZEMA: ICD-10-CM

## 2018-01-01 DIAGNOSIS — Z76.82 AWAITING ORGAN TRANSPLANT STATUS: Primary | ICD-10-CM

## 2018-01-01 DIAGNOSIS — I15.1 HYPERTENSION SECONDARY TO OTHER RENAL DISORDERS: ICD-10-CM

## 2018-01-01 DIAGNOSIS — I10 HTN (HYPERTENSION): ICD-10-CM

## 2018-01-01 DIAGNOSIS — F19.91 HISTORY OF DRUG USE: ICD-10-CM

## 2018-01-01 DIAGNOSIS — L85.3 XEROSIS OF SKIN: ICD-10-CM

## 2018-01-01 DIAGNOSIS — Z76.82 ORGAN TRANSPLANT CANDIDATE: Primary | ICD-10-CM

## 2018-01-01 DIAGNOSIS — Z01.818 PRE-OPERATIVE CLEARANCE: Primary | ICD-10-CM

## 2018-01-01 DIAGNOSIS — Z01.818 PRE-TRANSPLANT EVALUATION FOR ESRD (END STAGE RENAL DISEASE): Primary | ICD-10-CM

## 2018-01-01 DIAGNOSIS — M79.671 RIGHT FOOT PAIN: Primary | ICD-10-CM

## 2018-01-01 DIAGNOSIS — B18.2 CHRONIC HEPATITIS C WITHOUT HEPATIC COMA (H): ICD-10-CM

## 2018-01-01 DIAGNOSIS — K59.00 CONSTIPATION, UNSPECIFIED CONSTIPATION TYPE: Primary | ICD-10-CM

## 2018-01-01 DIAGNOSIS — B19.20 HEPATITIS C: Primary | ICD-10-CM

## 2018-01-01 DIAGNOSIS — N42.9 PROSTATE DISORDER: Primary | ICD-10-CM

## 2018-01-01 DIAGNOSIS — L98.0 PYOGENIC GRANULOMA: Primary | ICD-10-CM

## 2018-01-01 DIAGNOSIS — N52.01 ERECTILE DYSFUNCTION DUE TO ARTERIAL INSUFFICIENCY: ICD-10-CM

## 2018-01-01 LAB
A* LOCUS NMDP: NORMAL
A* LOCUS: NORMAL
A* NMDP: NORMAL
A*: NORMAL
ABO + RH BLD: NORMAL
ABTEST METHOD: NORMAL
ALBUMIN SERPL-MCNC: 3.2 G/DL (ref 3.4–5)
ALBUMIN UR-MCNC: 100 MG/DL
ALP SERPL-CCNC: 89 U/L (ref 40–150)
ALT SERPL W P-5'-P-CCNC: 14 U/L (ref 0–70)
ANION GAP SERPL CALCULATED.3IONS-SCNC: 17 MMOL/L (ref 3–14)
APPEARANCE UR: CLEAR
APTT PPP: 33 SEC (ref 22–37)
AST SERPL W P-5'-P-CCNC: 6 U/L (ref 0–45)
B* LOCUS NMDP: NORMAL
B* LOCUS: NORMAL
B* NMDP: NORMAL
B*: NORMAL
BASOPHILS # BLD AUTO: 0 10E9/L (ref 0–0.2)
BASOPHILS NFR BLD AUTO: 0.6 %
BILIRUB SERPL-MCNC: 0.4 MG/DL (ref 0.2–1.3)
BILIRUB UR QL STRIP: NEGATIVE
BLD GP AB SCN SERPL QL: NORMAL
BLD GP AB SCN TITR SERPL: NORMAL {TITER}
BLOOD BANK CMNT PATIENT-IMP: NORMAL
BUN SERPL-MCNC: 74 MG/DL (ref 7–30)
BW-1: NORMAL
C DIFF TOX B STL QL: ABNORMAL
C* LOCUS NMDP: NORMAL
C* LOCUS: NORMAL
C* NMDP: NORMAL
C*: NORMAL
CALCIUM SERPL-MCNC: 7.7 MG/DL (ref 8.5–10.1)
CARDIOLIPIN ANTIBODY IGG: <1.6 GPL-U/ML (ref 0–19.9)
CARDIOLIPIN ANTIBODY IGM: 0.8 MPL-U/ML (ref 0–19.9)
CHLORIDE SERPL-SCNC: 103 MMOL/L (ref 94–109)
CHOLEST SERPL-MCNC: 123 MG/DL
CMV IGG SERPL QL IA: >8 AI (ref 0–0.8)
CO2 SERPL-SCNC: 22 MMOL/L (ref 20–32)
COLOR UR AUTO: YELLOW
COPATH REPORT: NORMAL
CREAT SERPL-MCNC: 22.2 MG/DL (ref 0.66–1.25)
CREAT UR-MCNC: 119 MG/DL
DIFFERENTIAL METHOD BLD: ABNORMAL
DLCOCOR-%PRED-PRE: 114 %
DLCOCOR-PRE: 32.61 ML/MIN/MMHG
DLCOUNC-%PRED-PRE: 95 %
DLCOUNC-PRE: 27.13 ML/MIN/MMHG
DLCOUNC-PRED: 28.38 ML/MIN/MMHG
DPA1* LOCUS NMDP: NORMAL
DPA1* NMDP: NORMAL
DPA1*: NORMAL
DPA1*LOCUS: NORMAL
DPB1* NMDP: NORMAL
DPB1*: NORMAL
DPB1*LOCUS: NORMAL
DQA1*LOCUS NMDP: NORMAL
DQA1*LOCUS: NORMAL
DQA1*NMDP: NORMAL
DQB1* LOCUS NMDP: NORMAL
DQB1* LOCUS: NORMAL
DQB1* NMDP: NORMAL
DRB1* LOCUS NMDP: NORMAL
DRB1* LOCUS: NORMAL
DRB5* LOCUS NMDP: NORMAL
DRB5* LOCUS: NORMAL
DRB5* NMDP: NORMAL
DRSSO TEST METHOD: NORMAL
EBV VCA IGG SER QL IA: 7.9 AI (ref 0–0.8)
EOSINOPHIL # BLD AUTO: 0.2 10E9/L (ref 0–0.7)
EOSINOPHIL NFR BLD AUTO: 3.3 %
ERV-%PRED-PRE: 54 %
ERV-PRE: 0.42 L
ERV-PRED: 0.77 L
ERYTHROCYTE [DISTWIDTH] IN BLOOD BY AUTOMATED COUNT: 16 % (ref 10–15)
EXPTIME-PRE: 12.69 SEC
FEF2575-%PRED-POST: 43 %
FEF2575-%PRED-PRE: 44 %
FEF2575-POST: 1.24 L/SEC
FEF2575-PRE: 1.29 L/SEC
FEF2575-PRED: 2.88 L/SEC
FEFMAX-%PRED-PRE: 55 %
FEFMAX-PRE: 5.02 L/SEC
FEFMAX-PRED: 9.01 L/SEC
FEV1-%PRED-PRE: 63 %
FEV1-PRE: 2.05 L
FEV1FEV6-PRE: 70 %
FEV1FEV6-PRED: 80 %
FEV1FVC-PRE: 68 %
FEV1FVC-PRED: 76 %
FEV1SVC-PRE: 71 %
FEV1SVC-PRED: 69 %
FIFMAX-PRE: 4.39 L/SEC
FRCPLETH-%PRED-PRE: 95 %
FRCPLETH-PRE: 3.31 L
FRCPLETH-PRED: 3.46 L
FVC-%PRED-PRE: 73 %
FVC-PRE: 3.01 L
FVC-PRED: 4.1 L
G LAMBLIA AG STL QL IA: NORMAL
G LAMBLIA AG STL QL IA: NORMAL
GAMMA INTERFERON BACKGROUND BLD IA-ACNC: 0.04 IU/ML
GFR SERPL CREATININE-BSD FRML MDRD: 2 ML/MIN/1.7M2
GLIADIN IGA SER-ACNC: 1 U/ML
GLIADIN IGG SER-ACNC: <1 U/ML
GLUCOSE SERPL-MCNC: 120 MG/DL (ref 70–99)
GLUCOSE UR STRIP-MCNC: NEGATIVE MG/DL
HBV CORE AB SERPL QL IA: NONREACTIVE
HBV SURFACE AB SERPL IA-ACNC: 27.89 M[IU]/ML
HBV SURFACE AG SERPL QL IA: NONREACTIVE
HCT VFR BLD AUTO: 31.7 % (ref 40–53)
HCV AB SERPL QL IA: REACTIVE
HCV RNA SERPL NAA+PROBE-ACNC: NORMAL [IU]/ML
HCV RNA SERPL NAA+PROBE-ACNC: NORMAL [IU]/ML
HCV RNA SERPL NAA+PROBE-LOG IU: NORMAL LOG IU/ML
HCV RNA SERPL NAA+PROBE-LOG IU: NORMAL LOG IU/ML
HDLC SERPL-MCNC: 33 MG/DL
HGB BLD-MCNC: 9.8 G/DL (ref 13.3–17.7)
HGB UR QL STRIP: NEGATIVE
HIV 1+2 AB+HIV1 P24 AG SERPL QL IA: NONREACTIVE
HLA TYPING COMPLETE SOT RECIPIENT: NORMAL
IC-%PRED-PRE: 62 %
IC-PRE: 2.47 L
IC-PRED: 3.95 L
IMM GRANULOCYTES # BLD: 0 10E9/L (ref 0–0.4)
IMM GRANULOCYTES NFR BLD: 0.4 %
INR PPP: 1.06 (ref 0.86–1.14)
INTERPRETATION ECG - MUSE: NORMAL
KETONES UR STRIP-MCNC: NEGATIVE MG/DL
LA PPP-IMP: NEGATIVE
LDLC SERPL CALC-MCNC: 70 MG/DL
LEUKOCYTE ESTERASE UR QL STRIP: NEGATIVE
LYMPHOCYTES # BLD AUTO: 1.6 10E9/L (ref 0.8–5.3)
LYMPHOCYTES NFR BLD AUTO: 23.4 %
M TB IFN-G BLD-IMP: NEGATIVE
M TB IFN-G CD4+ BCKGRND COR BLD-ACNC: >10 IU/ML
MCH RBC QN AUTO: 31.4 PG (ref 26.5–33)
MCHC RBC AUTO-ENTMCNC: 30.9 G/DL (ref 31.5–36.5)
MCV RBC AUTO: 102 FL (ref 78–100)
MITOGEN IGNF BCKGRD COR BLD-ACNC: 0 IU/ML
MITOGEN IGNF BCKGRD COR BLD-ACNC: 0.01 IU/ML
MONOCYTES # BLD AUTO: 0.4 10E9/L (ref 0–1.3)
MONOCYTES NFR BLD AUTO: 6.3 %
MUCOUS THREADS #/AREA URNS LPF: PRESENT /LPF
MVV-%PRED-PRE: 56 %
MVV-PRE: 78 L/MIN
MVV-PRED: 138 L/MIN
NEUTROPHILS # BLD AUTO: 4.6 10E9/L (ref 1.6–8.3)
NEUTROPHILS NFR BLD AUTO: 66 %
NITRATE UR QL: NEGATIVE
NONHDLC SERPL-MCNC: 90 MG/DL
NRBC # BLD AUTO: 0 10*3/UL
NRBC BLD AUTO-RTO: 0 /100
PH UR STRIP: 7 PH (ref 5–7)
PHOSPHATE SERPL-MCNC: 6.7 MG/DL (ref 2.5–4.5)
PLATELET # BLD AUTO: 225 10E9/L (ref 150–450)
POTASSIUM SERPL-SCNC: 4.3 MMOL/L (ref 3.4–5.3)
PRA SINGLE ANTIGEN IGG ANTIBODY: NORMAL
PROT SERPL-MCNC: 7.4 G/DL (ref 6.8–8.8)
PROT UR-MCNC: 1.89 G/L
PROT/CREAT 24H UR: 1.59 G/G CR (ref 0–0.2)
PROTOCOL CUTOFF: NORMAL
PSA FREE MFR SERPL: 57 %
PSA FREE SERPL-MCNC: 0.4 NG/ML
PSA SERPL-ACNC: 0.73 UG/L (ref 0–4)
PSA SERPL-MCNC: 0.7 NG/ML (ref 0–4)
RBC # BLD AUTO: 3.12 10E12/L (ref 4.4–5.9)
RBC #/AREA URNS AUTO: <1 /HPF (ref 0–2)
RVPLETH-%PRED-PRE: 126 %
RVPLETH-PRE: 2.89 L
RVPLETH-PRED: 2.28 L
SA1 CELL: NORMAL
SA1 COMMENTS: NORMAL
SA1 HI RISK ABY: NORMAL
SA1 MOD RISK ABY: NORMAL
SA1 TEST METHOD: NORMAL
SA2 CELL: NORMAL
SA2 COMMENTS: NORMAL
SA2 HI RISK ABY UA: NORMAL
SA2 MOD RISK ABY: NORMAL
SA2 TEST METHOD: NORMAL
SODIUM SERPL-SCNC: 142 MMOL/L (ref 133–144)
SOURCE: ABNORMAL
SP GR UR STRIP: 1.01 (ref 1–1.03)
SPECIMEN EXP DATE BLD: NORMAL
SPECIMEN EXP DATE BLD: NORMAL
SPECIMEN SOURCE: ABNORMAL
SPECIMEN SOURCE: NORMAL
T PALLIDUM AB SER QL: NONREACTIVE
THROMBIN TIME: 20.3 SEC (ref 13–19)
TLCPLETH-%PRED-PRE: 86 %
TLCPLETH-PRE: 5.78 L
TLCPLETH-PRED: 6.72 L
TRIGL SERPL-MCNC: 103 MG/DL
TSH SERPL DL<=0.005 MIU/L-ACNC: 0.67 MU/L (ref 0.4–4)
TTG IGA SER-ACNC: <1 U/ML
TTG IGG SER-ACNC: 1 U/ML
UNACCEPTABLE ANTIGEN: NORMAL
UNOS CPRA: 50
UROBILINOGEN UR STRIP-MCNC: 0 MG/DL (ref 0–2)
VA-%PRED-PRE: 72 %
VA-PRE: 4.64 L
VC-%PRED-PRE: 61 %
VC-PRE: 2.89 L
VC-PRED: 4.72 L
VZV IGG SER QL IA: 5.6 AI (ref 0–0.8)
WBC # BLD AUTO: 7 10E9/L (ref 4–11)
WBC #/AREA URNS AUTO: <1 /HPF (ref 0–5)

## 2018-01-01 PROCEDURE — 81240 F2 GENE: CPT | Performed by: PHYSICIAN ASSISTANT

## 2018-01-01 PROCEDURE — 85730 THROMBOPLASTIN TIME PARTIAL: CPT | Performed by: PHYSICIAN ASSISTANT

## 2018-01-01 PROCEDURE — 86644 CMV ANTIBODY: CPT | Performed by: PHYSICIAN ASSISTANT

## 2018-01-01 PROCEDURE — 91200 LIVER ELASTOGRAPHY: CPT | Mod: ZF

## 2018-01-01 PROCEDURE — 80053 COMPREHEN METABOLIC PANEL: CPT | Performed by: PHYSICIAN ASSISTANT

## 2018-01-01 PROCEDURE — 86787 VARICELLA-ZOSTER ANTIBODY: CPT | Performed by: PHYSICIAN ASSISTANT

## 2018-01-01 PROCEDURE — 36415 COLL VENOUS BLD VENIPUNCTURE: CPT | Performed by: NURSE PRACTITIONER

## 2018-01-01 PROCEDURE — 85613 RUSSELL VIPER VENOM DILUTED: CPT | Performed by: PHYSICIAN ASSISTANT

## 2018-01-01 PROCEDURE — 84443 ASSAY THYROID STIM HORMONE: CPT | Performed by: INTERNAL MEDICINE

## 2018-01-01 PROCEDURE — 83516 IMMUNOASSAY NONANTIBODY: CPT | Mod: 91 | Performed by: INTERNAL MEDICINE

## 2018-01-01 PROCEDURE — 84156 ASSAY OF PROTEIN URINE: CPT | Performed by: PHYSICIAN ASSISTANT

## 2018-01-01 PROCEDURE — 86147 CARDIOLIPIN ANTIBODY EA IG: CPT | Performed by: PHYSICIAN ASSISTANT

## 2018-01-01 PROCEDURE — 81001 URINALYSIS AUTO W/SCOPE: CPT | Performed by: PHYSICIAN ASSISTANT

## 2018-01-01 PROCEDURE — 84153 ASSAY OF PSA TOTAL: CPT | Performed by: NURSE PRACTITIONER

## 2018-01-01 PROCEDURE — 84154 ASSAY OF PSA FREE: CPT | Performed by: NURSE PRACTITIONER

## 2018-01-01 PROCEDURE — 84100 ASSAY OF PHOSPHORUS: CPT | Performed by: PHYSICIAN ASSISTANT

## 2018-01-01 PROCEDURE — G0472 HEP C SCREEN HIGH RISK/OTHER: HCPCS | Performed by: PHYSICIAN ASSISTANT

## 2018-01-01 PROCEDURE — 40000724 ZZH UMP OPEN ENCOUNTER >70 DAYS

## 2018-01-01 PROCEDURE — 87522 HEPATITIS C REVRS TRNSCRPJ: CPT | Performed by: INTERNAL MEDICINE

## 2018-01-01 PROCEDURE — 83516 IMMUNOASSAY NONANTIBODY: CPT | Performed by: INTERNAL MEDICINE

## 2018-01-01 PROCEDURE — 85025 COMPLETE CBC W/AUTO DIFF WBC: CPT | Performed by: PHYSICIAN ASSISTANT

## 2018-01-01 PROCEDURE — 85670 THROMBIN TIME PLASMA: CPT | Performed by: PHYSICIAN ASSISTANT

## 2018-01-01 PROCEDURE — 40000866 ZZHCL STATISTIC HIV 1/2 ANTIGEN/ANTIBODY PRETRANSPLANT ONLY: Performed by: PHYSICIAN ASSISTANT

## 2018-01-01 PROCEDURE — 86665 EPSTEIN-BARR CAPSID VCA: CPT | Performed by: PHYSICIAN ASSISTANT

## 2018-01-01 PROCEDURE — 99214 OFFICE O/P EST MOD 30 MIN: CPT | Mod: ZP | Performed by: INTERNAL MEDICINE

## 2018-01-01 PROCEDURE — 86706 HEP B SURFACE ANTIBODY: CPT | Performed by: PHYSICIAN ASSISTANT

## 2018-01-01 PROCEDURE — 86704 HEP B CORE ANTIBODY TOTAL: CPT | Performed by: PHYSICIAN ASSISTANT

## 2018-01-01 PROCEDURE — 86780 TREPONEMA PALLIDUM: CPT | Performed by: PHYSICIAN ASSISTANT

## 2018-01-01 PROCEDURE — 81241 F5 GENE: CPT | Performed by: PHYSICIAN ASSISTANT

## 2018-01-01 PROCEDURE — G0103 PSA SCREENING: HCPCS | Performed by: PHYSICIAN ASSISTANT

## 2018-01-01 PROCEDURE — 86886 COOMBS TEST INDIRECT TITER: CPT | Performed by: PHYSICIAN ASSISTANT

## 2018-01-01 PROCEDURE — 86480 TB TEST CELL IMMUN MEASURE: CPT | Performed by: PHYSICIAN ASSISTANT

## 2018-01-01 PROCEDURE — 36415 COLL VENOUS BLD VENIPUNCTURE: CPT | Performed by: INTERNAL MEDICINE

## 2018-01-01 PROCEDURE — 87522 HEPATITIS C REVRS TRNSCRPJ: CPT | Performed by: PHYSICIAN ASSISTANT

## 2018-01-01 PROCEDURE — 80061 LIPID PANEL: CPT | Performed by: PHYSICIAN ASSISTANT

## 2018-01-01 PROCEDURE — G0463 HOSPITAL OUTPT CLINIC VISIT: HCPCS | Mod: ZF

## 2018-01-01 PROCEDURE — 85610 PROTHROMBIN TIME: CPT | Performed by: PHYSICIAN ASSISTANT

## 2018-01-01 PROCEDURE — G0499 HEPB SCREEN HIGH RISK INDIV: HCPCS | Performed by: PHYSICIAN ASSISTANT

## 2018-01-01 RX ORDER — KETOCONAZOLE 20 MG/G
CREAM TOPICAL
Qty: 60 G | Refills: 11 | Status: SHIPPED | OUTPATIENT
Start: 2018-01-01

## 2018-01-01 RX ORDER — SILDENAFIL 50 MG/1
25 TABLET, FILM COATED ORAL DAILY PRN
Qty: 30 TABLET | Refills: 0 | Status: SHIPPED | OUTPATIENT
Start: 2018-01-01

## 2018-01-01 RX ORDER — ASPIRIN 81 MG/1
81 TABLET ORAL DAILY
Qty: 90 TABLET | Refills: 0 | Status: SHIPPED | OUTPATIENT
Start: 2018-01-01 | End: 2019-01-01

## 2018-01-01 RX ORDER — ASPIRIN 81 MG/1
81 TABLET ORAL DAILY
Qty: 90 TABLET | Refills: 0 | Status: SHIPPED | OUTPATIENT
Start: 2018-01-01 | End: 2018-01-01

## 2018-01-01 RX ORDER — KETOCONAZOLE 20 MG/ML
SHAMPOO TOPICAL
Qty: 120 ML | Refills: 11 | Status: SHIPPED | OUTPATIENT
Start: 2018-01-01 | End: 2019-09-19

## 2018-01-01 RX ORDER — CALCITRIOL 0.25 UG/1
CAPSULE, LIQUID FILLED ORAL
COMMUNITY
Start: 2018-01-01

## 2018-01-01 RX ORDER — TRIAMCINOLONE ACETONIDE 0.25 MG/G
CREAM TOPICAL
Qty: 80 G | Refills: 0 | Status: SHIPPED | OUTPATIENT
Start: 2018-01-01

## 2018-01-01 RX ORDER — KETOCONAZOLE 20 MG/G
CREAM TOPICAL DAILY
Qty: 60 G | Refills: 11 | Status: SHIPPED | OUTPATIENT
Start: 2018-01-01 | End: 2019-09-19

## 2018-01-01 RX ORDER — POLYETHYLENE GLYCOL 3350 17 G/17G
1 POWDER, FOR SOLUTION ORAL 2 TIMES DAILY
Qty: 510 G | Refills: 3 | Status: SHIPPED | OUTPATIENT
Start: 2018-01-01

## 2018-01-01 RX ORDER — TRIAMCINOLONE ACETONIDE 0.25 MG/G
CREAM TOPICAL
Qty: 80 G | Refills: 1 | Status: SHIPPED | OUTPATIENT
Start: 2018-01-01

## 2018-01-01 RX ORDER — IBUPROFEN 600 MG/1
600 TABLET, FILM COATED ORAL EVERY 8 HOURS PRN
Qty: 90 TABLET | Refills: 0 | Status: SHIPPED | OUTPATIENT
Start: 2018-01-01 | End: 2019-01-01

## 2018-01-01 RX ORDER — ISOSORBIDE MONONITRATE 60 MG/1
60 TABLET, EXTENDED RELEASE ORAL 2 TIMES DAILY
Qty: 180 TABLET | Refills: 0 | Status: SHIPPED | OUTPATIENT
Start: 2018-01-01

## 2018-01-01 RX ORDER — GENTIAN VIOLET 2% 2 G/100ML
SOLUTION TOPICAL
Qty: 59 ML | Refills: 3 | Status: SHIPPED | OUTPATIENT
Start: 2018-01-01

## 2018-01-01 ASSESSMENT — PAIN SCALES - GENERAL
PAINLEVEL: NO PAIN (0)

## 2018-01-03 NOTE — PROGRESS NOTES
HEPATOLOGY CONSULT    CC/REFERRING MD:  Chris Barraza    REASON FOR CONSULTATION:   The pt is a 57 year old male who I was asked to see in consultation at the request of Dr. Chris Barraza for hepatitis C.    ASSESSMENT/PLAN:  57-year-old man with end-stage renal disease requiring peritoneal dialysis secondary to hypertension who presents for consideration of treatment of genotype 1a hepatitis C.      He does not appear to have cirrhosis and his most recent liver panel was completely normal.  In addition, he has not been exposed to hepatitis B.  He is an excellent candidate for pan-genotypic antiviral glecaprevir/pibrentasvir (Mavyret) for 8 weeks duration.     He has been having roughly 6 years of postprandial diarrhea associated with urgency and occasional incontinence.  This is no worse than baseline at the moment he does not have any red flag symptoms such as fever, weight loss, hematochezia/melena.  He states he has had a number of tests done in the past at various different institutions.  Therefore, we will ask him to be seen in our luminal GI department after all of his records are collected.    -Mavryet x8 weeks  -Follow-up in 3 months  -Start loperamide and psyllium for diarrhea  -Luminal GI referral for diarrhea, requesting records    RTC 3 months    Patient was seen and discussed with attending, Dr. Menendez.    Thank you for this consultation.  It was a pleasure to participate in the care of this patient; please contact us with any further questions.      Wes Guerra MD  Fellow  Division of Gastroenterology, Hepatology and Nutrition  [facility]    The patient was seen and examined.  The above assessment and plan was developed jointly with the fellow.      Sanford Menendez MD      Professor of Medicine  [facility] [employer]      Executive Medical Director, Solid Organ Transplant Program  [facility]     HPI  57-year-old man with end-stage renal  disease requiring peritoneal dialysis secondary to hypertension who presents for consideration of treatment of genotype 1a hepatitis C.      He states he is currently feeling well.  He denies any issues with his liver other than the hepatitis C infection.  He has never been told that he has cirrhosis or any other liver disease.  His quit drinking alcohol for the past year.  He does not smoke.      He endorses 6 years of mainly postprandial diarrhea although will have occasional nocturnal incontinence.  He has not been able to identify any specific food in particular. He denies weight loss, abdominal pain, hematochezia, melena. He has been seen for this in the past but is not certain what had been done.  He had a colonoscopy 8/23/16 through [facility], which I am unable to see the results of.  He is not certain if he had random biopsies for microscopic colitis.    ROS:    No fevers or chills  No weight loss  No blurry vision, double vision or change in vision  No sore throat  No lymphadenopathy  No headache, paraesthesias, or weakness in a limb  No shortness of breath or wheezing  No chest pain or pressure  No arthralgias or myalgias  No rashes or skin changes  No odynophagia or dysphagia  No BRBPR, hematochezia, melena  No dysuria, frequency or urgency  No hot/cold intolerance or polyria  No anxiety or depression    PERTINENT PAST MEDICAL HISTORY:  Past Medical History:   Diagnosis Date     Asthma      Chronic renal failure      COPD (chronic obstructive pulmonary disease) (H)      Gastrointestinal problem      Hepatitis C      Hyperlipidemia LDL goal <100 4/3/2013     Hypertension      Hypertriglyceridemia 7/13/2015     Liver disease      Obstructive sleep apnea      Sinus problem      PREVIOUS SURGERIES:  Past Surgical History:   Procedure Laterality Date     CHOLECYSTECTOMY  2013     LAPAROSCOPIC INSERTION CATHETER PERITONEAL DIALYSIS N/A 3/16/2016    Procedure: LAPAROSCOPIC INSERTION CATHETER PERITONEAL  DIALYSIS;  Surgeon: Winnie Colmenares MD;  Location: [facility] OR     SEPTOPLASTY, TURBINOPLASTY, COMBINED  Nov 2008    inferior turbinate reduction and septoplasty due to deviation to right     TURBINOPLASTY  July 2008    bilateral inferior turbinate reduction     TURBINOPLASTY  1/27/2014    Procedure: TURBINOPLASTY;  Bilateral Turbinate Reduction ;  Surgeon: Cindy Carrasquillo MD;  Location: [facility] OR     PREVIOUS ENDOSCOPY:  Colonoscopy 8/23/16-we do not have the report    ALLERGIES:  No Known Allergies    PERTINENT MEDICATIONS:    Current Outpatient Prescriptions:      B Complex-C-Folic Acid (DIALYVITE PO), Take by mouth daily, Disp: , Rfl:      psyllium 0.52 G capsule, Take 1 capsule (0.52 g) by mouth daily, Disp: 540 capsule, Rfl: 0     loperamide (IMODIUM A-D) 2 MG tablet, Take 1 tablet (2 mg) by mouth 3 times daily as needed Take 2mg (1 tablet) 30 minutes before eating., Disp: 30 tablet, Rfl: 3     Glecaprevir-Pibrentasvir (MAVYRET) 100-40 MG TABS, Take 1 capsule by mouth daily, Disp: 28 tablet, Rfl: 1     carvedilol (COREG) 12.5 MG tablet, Take 25 mg by mouth daily , Disp: 180 tablet, Rfl: 3     niacin (SLO-NIACIN) 500 MG CR tablet, Take 1 tablet (500 mg) by mouth At Bedtime, Disp: , Rfl: 3     FUROSEMIDE PO, Take 80 mg by mouth daily, Disp: , Rfl:      MAGNESIUM OXIDE PO, Take 400 mg by mouth daily, Disp: , Rfl:      ALLOPURINOL PO, Take 100 mg by mouth daily, Disp: , Rfl:      TRAZODONE HCL PO, Take 100 mg by mouth daily, Disp: , Rfl:      ciclopirox (PENLAC) 8 % SOLN, Apply to adjacent skin and affected nails daily.  Remove with alcohol every 7 days, then repeat., Disp: , Rfl:      POLYETHYLENE GLYCOL EX, , Disp: , Rfl:      OTHER MEDICAL SUPPLIES, CPAP therapy, Disp: , Rfl:      ONDANSETRON PO, Take 4 mg by mouth 3 times daily, Disp: , Rfl:      triamcinolone (KENALOG) 0.025 % cream, Apply topically 2 times daily, Disp: , Rfl:      MINOXIDIL PO, Take 2.5 mg by mouth daily, Disp: , Rfl:      UNABLE TO FIND, 1 tsp.  MEDICATION NAME: calcium, Disp: , Rfl:      UNABLE TO FIND, MEDICATION NAME: Centamicin Cream prn, Disp: , Rfl:      GABAPENTIN PO, Take 100 mg by mouth At Bedtime, Disp: , Rfl:      isosorbide mononitrate (IMDUR) 60 MG 24 hr tablet, Take 1 tablet (60 mg) by mouth 2 times daily DO NOT TAKE WITH SILDENAFIL (viagra), Disp: 180 tablet, Rfl: 3     senna-docusate (YASMINE-COLACE) 8.6-50 MG per tablet, Take 1-2 tablets by mouth 2 times daily as needed for constipation, Disp: 50 tablet, Rfl: 0     albuterol (PROAIR HFA, PROVENTIL HFA, VENTOLIN HFA) 108 (90 BASE) MCG/ACT inhaler, Inhale 2 puffs into the lungs every 6 hours as needed , Disp: , Rfl:      aspirin EC 81 MG tablet, Take 1 tablet (81 mg) by mouth daily, Disp: 91 tablet, Rfl: 3     Varenicline Tartrate (CHANTIX PO), Take 1 mg by mouth 2 times daily as needed , Disp: , Rfl:      Colchicine (COLCRYS PO), Take 0.6 mg by mouth 2 times daily, Disp: , Rfl:      Cholestyramine (QUESTRAN PO), Take by mouth as needed , Disp: , Rfl:      Budesonide-Formoterol Fumarate (SYMBICORT IN), Inhale 2 puffs into the lungs 2 times daily , Disp: , Rfl:      Cholecalciferol (VITAMIN D3 PO), Take 50,000 Units by mouth daily , Disp: , Rfl:      Ezetimibe (ZETIA PO), Take 10 mg by mouth, Disp: , Rfl:      Lidocaine HCl 2 % CREA, Externally apply 1 dose topically daily, Disp: 60 mL, Rfl: 0     losartan (COZAAR) 100 MG tablet, Take 1 tablet by mouth daily., Disp: 30 tablet, Rfl: 2     hydrALAZINE (APRESOLINE) 100 MG TABS, Take 1 tablet by mouth 3 times daily., Disp: 90 tablet, Rfl: 2     amLODIPine (NORVASC) 10 MG tablet, Take 1 tablet by mouth daily., Disp: , Rfl:      omeprazole (PRILOSEC) 40 MG capsule, Take 1 capsule by mouth as needed , Disp: , Rfl:      Per[facility] [employer] (DIANEAL PD-2.5%, CALCIUM 3.5 MEQ/L,) 396 MOSM/L SOLN, 1,000 mLs by Dialysis route once for 1 dose, Disp: 1000 mL, Rfl: 0     Nutritional Supplements (NEPRO) LIQD, Take 1 Can by mouth daily (Patient not  "taking: Reported on 9/20/2017), Disp: 90 each, Rfl: 12  No current facility-administered medications for this visit.     Facility-Administered Medications Ordered in Other Visits:      hydrALAZINE (APRESOLINE) injection, , , PRN, Elzen, Jason V, APRN CRNA, 10 mg at 01/27/14 0923    SOCIAL HISTORY:  Social History     Social History     Marital status: Single     Spouse name: Leonela     Number of children: 1     Years of education: N/A     Occupational History      Self Employed.     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    PHYSICAL EXAMINATION:  Constitutional: aaox3, cooperative, pleasant, not dyspneic/diaphoretic, no acute distress  Vitals reviewed: /81  Pulse 68  Temp 97.6  F (36.4  C) (Oral)  Ht 1.727 m (5' 8\")  Wt 99.9 kg (220 lb 3.2 oz)  SpO2 99%  BMI 33.48 kg/m2  Wt:   Wt Readings from Last 2 Encounters:   12/29/17 99.9 kg (220 lb 3.2 oz)   09/20/17 98.8 kg (217 lb 12.8 oz)      Eyes: Sclera anicteric/injected  Ears/nose/mouth/throat: Normal oropharynx without ulcers or exudate, mucus membranes moist, hearing intact  Neck: supple, thyroid normal size  CV: No " edema  Respiratory: Unlabored breathing  Lymph: No cervical lymphadenopathy  Abd: +Peritoneal dialysis catheter, nondistended, +bs, no hepatosplenomegaly, nontender, no peritoneal signs  Skin: warm, perfused, no jaundice  Psych: Normal affect  MSK: Normal gait    PERTINENT STUDIES:  Orders Only on 12/21/2017   Component Date Value Ref Range Status     Bilirubin Direct 12/21/2017 0.1  0.0 - 0.2 mg/dL Final     Bilirubin Total 12/21/2017 0.4  0.2 - 1.3 mg/dL Final     Albumin 12/21/2017 3.6  3.4 - 5.0 g/dL Final     Protein Total 12/21/2017 7.6  6.8 - 8.8 g/dL Final     Alkaline Phosphatase 12/21/2017 56  40 - 150 U/L Final     ALT 12/21/2017 24  0 - 70 U/L Final     AST 12/21/2017 <3  0 - 45 U/L Final     Sodium 12/21/2017 140  133 - 144 mmol/L Final     Potassium 12/21/2017 4.6  3.4 - 5.3 mmol/L Final     Chloride 12/21/2017 106  94 - 109 mmol/L Final     Carbon Dioxide 12/21/2017 21  20 - 32 mmol/L Final     Anion Gap 12/21/2017 13  3 - 14 mmol/L Final     Glucose 12/21/2017 99  70 - 99 mg/dL Final     Urea Nitrogen 12/21/2017 72* 7 - 30 mg/dL Final     Creatinine 12/21/2017 17.30* 0.66 - 1.25 mg/dL Final     GFR Estimate 12/21/2017 3* >60 mL/min/1.7m2 Final     GFR Estimate If Black 12/21/2017 3* >60 mL/min/1.7m2 Final     Calcium 12/21/2017 7.5* 8.5 - 10.1 mg/dL Final     WBC 12/21/2017 5.2  4.0 - 11.0 10e9/L Final     RBC Count 12/21/2017 2.95* 4.4 - 5.9 10e12/L Final     Hemoglobin 12/21/2017 9.7* 13.3 - 17.7 g/dL Final     Hematocrit 12/21/2017 30.0* 40.0 - 53.0 % Final     MCV 12/21/2017 102* 78 - 100 fl Final     MCH 12/21/2017 32.9  26.5 - 33.0 pg Final     MCHC 12/21/2017 32.3  31.5 - 36.5 g/dL Final     RDW 12/21/2017 14.7  10.0 - 15.0 % Final     Platelet Count 12/21/2017 205  150 - 450 10e9/L Final     INR 12/21/2017 1.05  0.86 - 1.14 Final     HCV RNA Quant IU/ml 12/21/2017 [id number]* HCVND^HCV RNA Not Detected [IU]/mL Final     Log of HCV RNA Qt 12/21/2017 5.9* <1.2 Log IU/mL Final

## 2018-01-04 ENCOUNTER — TELEPHONE (OUTPATIENT)
Dept: GASTROENTEROLOGY | Facility: CLINIC | Age: 58
End: 2018-01-04

## 2018-01-04 DIAGNOSIS — B19.20 HEPATITIS C VIRUS INFECTION WITHOUT HEPATIC COMA, UNSPECIFIED CHRONICITY: Primary | ICD-10-CM

## 2018-01-05 NOTE — TELEPHONE ENCOUNTER
PA Initiation    Medication: Mavyret  Insurance Company: [place] Medicaid ([facility] [place] [facility] to the [place]) - Phone [phone] Fax [phone]  Pharmacy Filling the Rx: [facility] - [place], [place] - [facility] [address]  Filling Pharmacy Phone: [phone]  Filling Pharmacy Fax: [phone]  Start Date: 1/5/2018

## 2018-01-08 NOTE — TELEPHONE ENCOUNTER
"PRIOR AUTHORIZATION DENIED    Medication: Mavyret    Denial Date: 1/5/2018    Denial Rational: Received fax back from insurance: \"The patient meets exclusion criteria: MELD is greater than or equal to 20.\"    Appeal Information: Please write a letter of medical necessity if you would like us to appeal, thank you.      "

## 2018-01-11 NOTE — TELEPHONE ENCOUNTER
Medication Appeal Initiation    We have initiated an appeal for the requested medication:  Medication: Mavyret  Appeal Start Date:  1/11/2018  Insurance Company: [place] Medicaid ([facility]) - Phone [phone] Fax [phone]  Comments:

## 2018-01-15 NOTE — TELEPHONE ENCOUNTER
MEDICATION APPEAL DENIED    Medication: Mavyret    Denial Date: 1/12/2018    Denial Rational: Denial upheld for meeting plan's exclusion criteria.

## 2018-01-22 NOTE — TELEPHONE ENCOUNTER
Case has been opened back up, plan is requiring HBV labs within the past 6 months. Could we please have this ordered?

## 2018-01-22 NOTE — TELEPHONE ENCOUNTER
HBV labs entered and patient updated to have drawn at any [place] clinic as soon as possible.  Call back number given if needed.

## 2018-02-01 NOTE — TELEPHONE ENCOUNTER
LVM  reminding patient that HBV labs are still needed for patients insurance to proceed with Hep C medication coverage.  Call back number given if needed.

## 2018-02-13 DIAGNOSIS — B19.20 HEPATITIS C VIRUS INFECTION WITHOUT HEPATIC COMA, UNSPECIFIED CHRONICITY: ICD-10-CM

## 2018-02-13 PROCEDURE — 86706 HEP B SURFACE ANTIBODY: CPT | Performed by: INTERNAL MEDICINE

## 2018-02-13 PROCEDURE — 36415 COLL VENOUS BLD VENIPUNCTURE: CPT | Performed by: FAMILY MEDICINE

## 2018-02-13 PROCEDURE — G0499 HEPB SCREEN HIGH RISK INDIV: HCPCS | Performed by: INTERNAL MEDICINE

## 2018-02-13 PROCEDURE — 86704 HEP B CORE ANTIBODY TOTAL: CPT | Performed by: INTERNAL MEDICINE

## 2018-02-13 NOTE — TELEPHONE ENCOUNTER
Left third VM asking patient to have HBV labs drawn in order to move foreword with getting Hep C medication approved, pt aware orders are in and he can come here or go to any local [facility] clinic.  Call back number given if needed.

## 2018-02-14 LAB
HBV CORE AB SERPL QL IA: NONREACTIVE
HBV SURFACE AB SERPL IA-ACNC: 46.85 M[IU]/ML
HBV SURFACE AG SERPL QL IA: NONREACTIVE

## 2018-02-19 NOTE — TELEPHONE ENCOUNTER
Denial for Mavyret upheld due to pt meeting exclusion criteria: severe end organ disease and not eligible for transplant(i.e., kidney).

## 2018-03-06 DIAGNOSIS — I15.1 HYPERTENSION SECONDARY TO OTHER RENAL DISORDERS: Primary | ICD-10-CM

## 2018-03-06 DIAGNOSIS — E78.5 HYPERLIPIDEMIA LDL GOAL <100: ICD-10-CM

## 2018-03-06 DIAGNOSIS — E78.1 HYPERTRIGLYCERIDEMIA: ICD-10-CM

## 2018-03-12 ENCOUNTER — MEDICAL CORRESPONDENCE (OUTPATIENT)
Dept: HEALTH INFORMATION MANAGEMENT | Facility: CLINIC | Age: 58
End: 2018-03-12

## 2018-03-13 ENCOUNTER — CARE COORDINATION (OUTPATIENT)
Dept: GASTROENTEROLOGY | Facility: CLINIC | Age: 58
End: 2018-03-13

## 2018-03-13 NOTE — TELEPHONE ENCOUNTER
Medication Appeal Initiation    We have initiated an appeal for the requested medication:  Medication: Mavyret  Appeal Start Date:  3/13/2018  Insurance Company: [place] Medicaid ([facility]) - Phone [phone] Fax [phone]  Comments:   Faxed appeal packet with updated letter from nephrologist into plan.

## 2018-03-13 NOTE — PROGRESS NOTES
3/13/2018  9:48 AM      Hep C Care Coordination Call   Connected with patient for follow up on Nephrology provider Dr. Castorena eligibility letter for kidney transplant, that was to be sent to the [facility] to be submitted with another appeal for the Hepatitis C treatment. This letter was received, scanned into Epic and faxed over to [employer] financial liaison Clari Peace to submit to insurance. Previous appeal per [employer] was denied d/t the following;       Clari Peace 3 weeks ago                Denial for Mavyret upheld due to pt meeting exclusion criteria: severe end organ disease and not eligible for transplant(i.e., kidney).     Patient is aware that another appeal will be submitted on his behalf and that the status of approval can take several weeks. Patient is aware he can contact this RN CC directly at [phone] with any further questions or concerns. Provider Dr. Menendez in the hepatology clinic also updated on patient status. I will follow up with patient once I hear back on the insurance approval status.           Elena Flannery RN, BSN, PHN  [facility]   RN Care Coordinator Hepatology Specialty Clinic/Program

## 2018-03-15 NOTE — TELEPHONE ENCOUNTER
MEDICATION APPEAL APPROVED    Medication: Mavyret(approved)  Authorization Effective Date: 3/13/2018  Authorization Expiration Date: 5/7/2018  Approved Dose/Quantity: 84/28days  Reference #: [id number]   Insurance Company: [place] Medicaid ([facility]) - Phone [phone] Fax [phone]  Expected CoPay: 0     CoPay Card Available: No   Foundation Assistance Needed:    Which Pharmacy is filling the prescription (Not needed for infusion/clinic administered): [facility] - [place], [place] - [address]

## 2018-03-19 DIAGNOSIS — B18.2 CHRONIC HEPATITIS C WITHOUT HEPATIC COMA (H): Primary | ICD-10-CM

## 2018-03-26 ENCOUNTER — TELEPHONE (OUTPATIENT)
Dept: CARDIOLOGY | Facility: CLINIC | Age: 58
End: 2018-03-26

## 2018-03-27 ENCOUNTER — OFFICE VISIT (OUTPATIENT)
Dept: GASTROENTEROLOGY | Facility: CLINIC | Age: 58
End: 2018-03-27
Attending: INTERNAL MEDICINE
Payer: MEDICAID

## 2018-03-27 VITALS
DIASTOLIC BLOOD PRESSURE: 98 MMHG | HEIGHT: 68 IN | TEMPERATURE: 97.9 F | HEART RATE: 83 BPM | OXYGEN SATURATION: 98 % | SYSTOLIC BLOOD PRESSURE: 145 MMHG | BODY MASS INDEX: 32.04 KG/M2 | WEIGHT: 211.4 LBS

## 2018-03-27 DIAGNOSIS — B18.2 CHRONIC HEPATITIS C WITHOUT HEPATIC COMA (H): Primary | ICD-10-CM

## 2018-03-27 PROCEDURE — G0463 HOSPITAL OUTPT CLINIC VISIT: HCPCS | Mod: ZF

## 2018-03-27 RX ORDER — QUININE SULFATE 324 MG/1
324 CAPSULE ORAL
Qty: 100 CAPSULE | Refills: 3 | Status: SHIPPED | OUTPATIENT
Start: 2018-03-27 | End: 2018-03-27

## 2018-03-27 RX ORDER — CHLORAL HYDRATE 500 MG
2 CAPSULE ORAL DAILY
COMMUNITY
End: 2018-01-01

## 2018-03-27 RX ORDER — QUININE SULFATE 324 MG/1
324 CAPSULE ORAL
Qty: 100 CAPSULE | Refills: 3 | Status: SHIPPED | OUTPATIENT
Start: 2018-03-27

## 2018-03-27 ASSESSMENT — PAIN SCALES - GENERAL: PAINLEVEL: NO PAIN (0)

## 2018-03-27 NOTE — LETTER
3/27/2018      RE: Rommel Fan  [address] [place] [address]       HISTORY OF PRESENT ILLNESS:  I had the pleasure of seeing Rommel Fan for followup in the Liver Clinic at the [facility] on 03/27/2018.  Mr. Fan returns for followup of chronic hepatitis C.        After a great deal of difficulty we finally got him approved for Mavyret and he is currently on treatment.  He is feeling well.  He denies any abdominal pain, itching or skin rash and has only mild fatigue.  He is on dialysis.  He denies any increased abdominal girth or lower extremity edema.  He denies any fevers or chills, cough or shortness of breath.  He denies any nausea, vomiting, diarrhea or constipation.  His appetite has been good and his weight has been stable.       Current Outpatient Prescriptions   Medication     fish oil-omega-3 fatty acids 1000 MG capsule     alpha-d-galactosidase (BEANO) tablet     Calcium Carbonate Antacid (CALCIUM ANTACID PO)     UNABLE TO FIND     quiNINE 324 MG capsule     B Complex-C-Folic Acid (DIALYVITE PO)     psyllium 0.52 G capsule     loperamide (IMODIUM A-D) 2 MG tablet     Glecaprevir-Pibrentasvir (MAVYRET) 100-40 MG TABS     carvedilol (COREG) 12.5 MG tablet     niacin (SLO-NIACIN) 500 MG CR tablet     FUROSEMIDE PO     MAGNESIUM OXIDE PO     ALLOPURINOL PO     TRAZODONE HCL PO     ciclopirox (PENLAC) 8 % SOLN     POLYETHYLENE GLYCOL EX     OTHER MEDICAL SUPPLIES     ONDANSETRON PO     triamcinolone (KENALOG) 0.025 % cream     MINOXIDIL PO     UNABLE TO FIND     UNABLE TO FIND     GABAPENTIN PO     isosorbide mononitrate (IMDUR) 60 MG 24 hr tablet     senna-docusate (YASMINE-COLACE) 8.6-50 MG per tablet     Nutritional Supplements (NEPRO) LIQD     albuterol (PROAIR HFA, PROVENTIL HFA, VENTOLIN HFA) 108 (90 BASE) MCG/ACT inhaler     aspirin EC 81 MG tablet     Varenicline Tartrate (CHANTIX PO)     Colchicine (COLCRYS PO)     Cholestyramine (QUESTRAN PO)      Budesonide-Formoterol Fumarate (SYMBICORT IN)     Cholecalciferol (VITAMIN D3 PO)     Ezetimibe (ZETIA PO)     Lidocaine HCl 2 % CREA     losartan (COZAAR) 100 MG tablet     hydrALAZINE (APRESOLINE) 100 MG TABS     amLODIPine (NORVASC) 10 MG tablet     omeprazole (PRILOSEC) 40 MG capsule     Peritoneal [employer] (DIANEAL PD-2.5%, CALCIUM 3.5 MEQ/L,) 396 MOSM/L SOLN     No current facility-administered medications for this visit.      Facility-Administered Medications Ordered in Other Visits   Medication     hydrALAZINE (APRESOLINE) injection     B/P: 145/98, T: 97.9, P: 83, R: Data Unavailable    HEENT exam shows no scleral icterus and no temporal muscle wasting.  His chest is clear.  His abdominal exam shows no increase in girth.  No masses or tenderness to palpation are present.  His liver is 10 cm in span without left lobe enlargement.  No spleen tip is palpable.  Extremity exam shows no edema.  Skin exam shows no stigmata of chronic liver disease.  Neurologic exam shows no asterixis.      LABORATORY DATA:  No new laboratory tests were obtained.      IMPRESSION:  My impression is that Mr. Fan has chronic hepatitis C, which he is now on treatment using Mavyret.  I do feel that there is a very good chance that he will be cured with this treatment and I will see him back in the clinic again in 4 months, which should be after treatment, but we will then check him in 3 months after stopping treatment to see whether he is actually cured of his disease.      Thank you very much for allowing me to participate in the care of this patient.  If you have any questions regarding my recommendations, please do not hesitate to contact me.       Sanford Menendez MD      Professor of Medicine  [employer]      Executive Medical Director, Solid Organ Transplant Program  [facility]

## 2018-03-27 NOTE — MR AVS SNAPSHOT
After Visit Summary   3/27/2018    Rommel Fan    MRN: [id number]           Patient Information     Date Of Birth          1960        Visit Information        Provider Department      3/27/2018 10:15 AM Sanford Menendez MD [facility] Hepatology        Today's Diagnoses     Chronic hepatitis C without hepatic coma (H)    -  1       Follow-ups after your visit        Follow-up notes from your care team     Return in about 3 months (around 6/27/2018).      Your next 10 appointments already scheduled     Jul 06, 2018 10:00 AM CDT   (Arrive by 9:45 AM)   New Patient Visit with Wes Guerra   [facility] Gastroenterology and IBD [facility] ([facility])    [address]  [address] [place] [address] [phone]              Who to contact     If you have questions or need follow up information about today's clinic visit or your schedule please contact [facility] HEPATOLOGY directly at [phone].  Normal or non-critical lab and imaging results will be communicated to you by [facility], letter or phone within 4 business days after the clinic has received the results. If you do not hear from us within 7 days, please contact the clinic through [employer] or phone. If you have a critical or abnormal lab result, we will notify you by phone as soon as possible.  Submit refill requests through [employer] or call your pharmacy and they will forward the refill request to us. Please allow 3 business days for your refill to be completed.          Additional Information About Your Visit        MyChart Information     [employer] gives you secure access to your electronic health record. If you see a primary care provider, you can also send messages to your care team and make appointments. If you have questions, please call your primary care clinic.  If you do not have a primary care provider, please call [phone] and they will assist you.        Care EveryWhere ID     This is your Care  "EveryWhere ID. This could be used by other organizations to access your [place] medical records  [id number]        Your Vitals Were     Pulse Temperature Height Pulse Oximetry BMI (Body Mass Index)       83 97.9  F (36.6  C) (Oral) 1.727 m (5' 8\") 98% 32.14 kg/m2        Blood Pressure from Last 3 Encounters:   03/27/18 (!) 145/98   12/29/17 138/81   09/20/17 135/85    Weight from Last 3 Encounters:   03/27/18 95.9 kg (211 lb 6.4 oz)   12/29/17 99.9 kg (220 lb 3.2 oz)   09/20/17 98.8 kg (217 lb 12.8 oz)              Today, you had the following     No orders found for display         Today's Medication Changes          These changes are accurate as of 3/27/18 11:59 PM.  If you have any questions, ask your nurse or doctor.               Start taking these medicines.        Dose/Directions    quiNINE 324 MG capsule   Used for:  Chronic hepatitis C without hepatic coma (H)   Started by:  Sanford Menendez MD        Dose:  324 mg   Take 1 capsule (324 mg) by mouth nightly as needed for moderate pain   Quantity:  100 capsule   Refills:  3            Where to get your medicines      Some of these will need a paper prescription and others can be bought over the counter.  Ask your nurse if you have questions.     Bring a paper prescription for each of these medications     quiNINE 324 MG capsule                Primary Care Provider Office Phone # Fax #    Chris Barraza [phone]       [facility] [address]  [place] [address]        Equal Access to Services     MERVIN BONILLA : Hadii diane manzoo Sobriaali, waaxda luqadaha, qaybta kaalmada adeegyada, waxay idikita mendez. So [facility] [phone].    ATENCIÓN: Si habla español, tiene a das disposición servicios gratuitos de asistencia lingüística. Llame al [phone].    We comply with applicable federal civil rights laws and [place] laws. We do not discriminate on the basis of race, color, national origin, age, " disability, sex, sexual orientation, or gender identity.            Thank you!     Thank you for choosing [facility] HEPATOLOGY  for your care. Our goal is always to provide you with excellent care. Hearing back from our patients is one way we can continue to improve our services. Please take a few minutes to complete the written survey that you may receive in the mail after your visit with us. Thank you!             Your Updated Medication List - Protect others around you: Learn how to safely use, store and throw away your medicines at www.disposemymeds.org.          This list is accurate as of 3/27/18 11:59 PM.  Always use your most recent med list.                   Brand Name Dispense Instructions for use Diagnosis    albuterol 108 (90 BASE) MCG/ACT Inhaler    PROAIR HFA/PROVENTIL HFA/VENTOLIN HFA     Inhale 2 puffs into the lungs every 6 hours as needed        ALLOPURINOL PO      Take 100 mg by mouth daily        alpha-d-galactosidase tablet      Take 1 tablet by mouth as needed for intestinal gas        amLODIPine 10 MG tablet    NORVASC     Take 1 tablet by mouth daily.        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 renal disorders       CALCIUM ANTACID PO      Take 1,000 mg by mouth        carvedilol 12.5 MG tablet    COREG    180 tablet    Take 25 mg by mouth daily    Hypertension secondary to other renal disorders, Cough, Shortness of breath       CHANTIX PO      Take 1 mg by mouth 2 times daily as needed        COLCRYS PO      Take 0.6 mg by mouth 2 times daily        DIALYVITE PO      Take by mouth daily        dianeal PD-2.5% dex (calcium 3.5 mEq/L) 396 MOSM/L Soln CAPD     1000 mL    1,000 mLs by Dialysis route once for 1 dose    PD catheter dysfunction, initial encounter (H), ESRD (end stage renal disease) (H)       fish oil-omega-3 fatty acids 1000 MG capsule      Take 2 g by mouth  daily        FUROSEMIDE PO      Take 80 mg by mouth daily        GABAPENTIN PO      Take 100 mg by mouth At Bedtime        Glecaprevir-Pibrentasvir 100-40 MG Tabs    MAVYRET    28 tablet    Take 1 capsule by mouth daily    Chronic hepatitis C without hepatic coma (H)       hydrALAZINE 100 MG Tabs tablet    APRESOLINE    90 tablet    Take 1 tablet by mouth 3 times daily.    Hypertension       isosorbide mononitrate 60 MG 24 hr tablet    IMDUR    180 tablet    Take 1 tablet (60 mg) by mouth 2 times daily DO NOT TAKE WITH SILDENAFIL (viagra)    HTN (hypertension)       Lidocaine HCl 2 % Crea     60 mL    Externally apply 1 dose topically daily    Pain in limb, Plantar fascial fibromatosis       loperamide 2 MG tablet    IMODIUM A-D    30 tablet    Take 1 tablet (2 mg) by mouth 3 times daily as needed Take 2mg (1 tablet) 30 minutes before eating.    Diarrhea, unspecified type       losartan 100 MG tablet    COZAAR    30 tablet    Take 1 tablet by mouth daily.    Hypertension       MAGNESIUM OXIDE PO      Take 400 mg by mouth daily        MINOXIDIL PO      Take 2.5 mg by mouth daily        NEPRO Liqd     90 each    Take 1 Can by mouth daily    CKD (chronic kidney disease) stage 5, GFR less than 15 ml/min (H), Protein-calorie malnutrition (H)       niacin 500 MG CR tablet    SLO-NIACIN     Take 1 tablet (500 mg) by mouth At Bedtime    Hypertriglyceridemia       omeprazole 40 MG capsule    priLOSEC     Take 1 capsule by mouth as needed        ONDANSETRON PO      Take 4 mg by mouth 3 times daily        OTHER MEDICAL SUPPLIES      CPAP therapy        PENLAC 8 % Soln   Generic drug:  ciclopirox      Apply to adjacent skin and affected nails daily.  Remove with alcohol every 7 days, then repeat.        POLYETHYLENE GLYCOL EX           psyllium 0.52 G capsule     540 capsule    Take 1 capsule (0.52 g) by mouth daily    Diarrhea, unspecified type       QUESTRAN PO      Take by mouth as needed        quiNINE 324 MG capsule      100 capsule    Take 1 capsule (324 mg) by mouth nightly as needed for moderate pain    Chronic hepatitis C without hepatic coma (H)       senna-docusate 8.6-50 MG per tablet    YASMINE-COLACE    50 tablet    Take 1-2 tablets by mouth 2 times daily as needed for constipation    Peritoneal dialysis status (H)       SYMBICORT IN      Inhale 2 puffs into the lungs 2 times daily        TRAZODONE HCL PO      Take 100 mg by mouth daily        triamcinolone 0.025 % cream    KENALOG     Apply topically 2 times daily        * UNABLE TO FIND      1 tsp. MEDICATION NAME: calcium        * UNABLE TO FIND      MEDICATION NAME: Centamicin Cream prn        UNABLE TO FIND      MEDICATION NAME: Liquid Calcium 1 teaspoon w/ and between meals        VITAMIN D3 PO      Take 2,000 Units by mouth daily        ZETIA PO      Take 10 mg by mouth        * Notice:  This list has 2 medication(s) that are the same as other medications prescribed for you. Read the directions carefully, and ask your doctor or other care provider to review them with you.

## 2018-03-27 NOTE — NURSING NOTE
"Chief Complaint   Patient presents with     RECHECK     HCV      Medication Change     needs to talk with fabian or  on how to take it        Initial BP (!) 145/98  Pulse 83  Temp 97.9  F (36.6  C) (Oral)  Ht 1.727 m (5' 8\")  Wt 95.9 kg (211 lb 6.4 oz)  SpO2 98%  BMI 32.14 kg/m2 Estimated body mass index is 32.14 kg/(m^2) as calculated from the following:    Height as of this encounter: 1.727 m (5' 8\").    Weight as of this encounter: 95.9 kg (211 lb 6.4 oz).  Medication Reconciliation: complete   Katie Field, VENUS      "

## 2018-04-01 NOTE — PROGRESS NOTES
HISTORY OF PRESENT ILLNESS:  I had the pleasure of seeing Rommel Fan for followup in the Liver Clinic at the [facility] on 03/27/2018.  Mr. Fan returns for followup of chronic hepatitis C.        After a great deal of difficulty we finally got him approved for Mavyret and he is currently on treatment.  He is feeling well.  He denies any abdominal pain, itching or skin rash and has only mild fatigue.  He is on dialysis.  He denies any increased abdominal girth or lower extremity edema.  He denies any fevers or chills, cough or shortness of breath.  He denies any nausea, vomiting, diarrhea or constipation.  His appetite has been good and his weight has been stable.       Current Outpatient Prescriptions   Medication     fish oil-omega-3 fatty acids 1000 MG capsule     alpha-d-galactosidase (BEANO) tablet     Calcium Carbonate Antacid (CALCIUM ANTACID PO)     UNABLE TO FIND     quiNINE 324 MG capsule     B Complex-C-Folic Acid (DIALYVITE PO)     psyllium 0.52 G capsule     loperamide (IMODIUM A-D) 2 MG tablet     Glecaprevir-Pibrentasvir (MAVYRET) 100-40 MG TABS     carvedilol (COREG) 12.5 MG tablet     niacin (SLO-NIACIN) 500 MG CR tablet     FUROSEMIDE PO     MAGNESIUM OXIDE PO     ALLOPURINOL PO     TRAZODONE HCL PO     ciclopirox (PENLAC) 8 % SOLN     POLYETHYLENE GLYCOL EX     OTHER MEDICAL SUPPLIES     ONDANSETRON PO     triamcinolone (KENALOG) 0.025 % cream     MINOXIDIL PO     UNABLE TO FIND     UNABLE TO FIND     GABAPENTIN PO     isosorbide mononitrate (IMDUR) 60 MG 24 hr tablet     senna-docusate (YASMINE-COLACE) 8.6-50 MG per tablet     Nutritional Supplements (NEPRO) LIQD     albuterol (PROAIR HFA, PROVENTIL HFA, VENTOLIN HFA) 108 (90 BASE) MCG/ACT inhaler     aspirin EC 81 MG tablet     Varenicline Tartrate (CHANTIX PO)     Colchicine (COLCRYS PO)     Cholestyramine (QUESTRAN PO)     Budesonide-Formoterol Fumarate (SYMBICORT IN)     Cholecalciferol (VITAMIN D3 PO)      Ezetimibe (ZETIA PO)     Lidocaine HCl 2 % CREA     losartan (COZAAR) 100 MG tablet     hydrALAZINE (APRESOLINE) 100 MG TABS     amLODIPine (NORVASC) 10 MG tablet     omeprazole (PRILOSEC) 40 MG capsule     Peritoneal [employer] (DIANEAL PD-2.5%, CALCIUM 3.5 MEQ/L,) 396 MOSM/L SOLN     No current facility-administered medications for this visit.      Facility-Administered Medications Ordered in Other Visits   Medication     hydrALAZINE (APRESOLINE) injection     B/P: 145/98, T: 97.9, P: 83, R: Data Unavailable    HEENT exam shows no scleral icterus and no temporal muscle wasting.  His chest is clear.  His abdominal exam shows no increase in girth.  No masses or tenderness to palpation are present.  His liver is 10 cm in span without left lobe enlargement.  No spleen tip is palpable.  Extremity exam shows no edema.  Skin exam shows no stigmata of chronic liver disease.  Neurologic exam shows no asterixis.      LABORATORY DATA:  No new laboratory tests were obtained.      IMPRESSION:  My impression is that Mr. Fan has chronic hepatitis C, which he is now on treatment using Mavyret.  I do feel that there is a very good chance that he will be cured with this treatment and I will see him back in the clinic again in 4 months, which should be after treatment, but we will then check him in 3 months after stopping treatment to see whether he is actually cured of his disease.      Thank you very much for allowing me to participate in the care of this patient.  If you have any questions regarding my recommendations, please do not hesitate to contact me.       Sanford Menendez MD      Professor of Medicine  [employer]      Executive Medical Director, Solid Organ Transplant Program  [facility]

## 2018-04-17 ENCOUNTER — CARE COORDINATION (OUTPATIENT)
Dept: GASTROENTEROLOGY | Facility: CLINIC | Age: 58
End: 2018-04-17

## 2018-04-17 DIAGNOSIS — B18.2 CHRONIC HEPATITIS C WITHOUT HEPATIC COMA (H): Primary | ICD-10-CM

## 2018-04-17 NOTE — LETTER
April 17, 2018       TO: Rommel HODGE Mita  [address]   [place] [address]       Dear Mr. Fan,    Below is a summary of your treatment schedule. Please follow the schedule as closely as possible. Labs should be drawn as close to the date indicated at the [facility] or [facility].    Hepatitis C Treatment  Treatment:  Mavyret x8 weeks         Start Date: 03/28/18    Week 4  Hepatic panel, bmp Lab Due: 4/25/2018   Please have this lab drawn on or within a week after this date 4/25/18. You do not need to fast for this lab.     Week 8 - End of Treatment  HCV RNA Quant Lab Due: 5/23/2018  Please have this lab drawn on or within a week after this date 5/23/18. You will need to repeat this lab 3 months after completing treatment to ensure you have cleared the virus. Please see date for the final lab below. You do not need to fast for this lab.     3 Months Post Treatment  HCV RNA Quant Lab Due: 8/21/2018 Please have this lab drawn on the specified date of 8/21/18 or within a week after. This final lab will determine if you have cleared the Hepatitis C virus with Mavyret treatment. Without this final lab, we will be unable to determine if treatment was successful. You do not need to fast for this lab.             Educational information to patient on Hep C treatment;     -Contact the [facility] Hepatology clinic and speak with RN Care Coordinator prior to starting any new prescribed or OTC medications.   -Take medications exactly as prescribed, do not change dose or stop taking without consulting your provider.   -Take Medication one time each day with or without food  -If you miss a dose of medication, then take it as soon as you remember on the same day. If not remembered on the same day, then skip the dose and take the next dose at the usual time. Do not take more than the recommended dose. Contact the clinic if you miss a dose.    Please contact the pharmacy 1-2 weeks prior to needing a  medication refill.      Side Effects  The most common side effects of Hep C medication treatment can include:  -tiredness  -headache  Notify the clinic of any side effects that bother you or that do not go away.   Possible side effects have been discussed.   Patient has been instructed to clinic for rash, itching or unmanageable nausea.    How to store Hep C Treatment Medications  -Store Medication at room temperature below 86 degrees F  -Keep Medication in it's original container  -Do not use Medication if the seal is broken or missing    General information  It is not known if treatment will prevent you from infecting another person or reinfecting yourself with the hepatitis C virus during treatment. It is best that as soon as you start treatment to buy a new toothbrush, disposable razors (if you use a rotating shaver you do not need to buy a new one) and nail clippers. If you check your blood sugar at home, please dispose of the fingerstick needle after each use and DO NOT REUSE the insulin needles. These items should not be shared with anyone.        If you have any questions, please contact the main clinic at [phone] or your RN Care Coordinator at [phone]. We appreciate you choosing the [facility] for your treatment.             Elena Flannery RN, BSN, PHN  [facility]  Care Coordinator for Hepatology Clinic/Specialty Program

## 2018-04-17 NOTE — PROGRESS NOTES
4/17/2018  10:22 AM      Hep C Care Coordination   Multiple attempts to connect with patient, no answer. Patient connected with Moni WEBB Nurse from [facility] and reported starting Hepatitis C treatment on 3/28/18. Patient will be taking Mavyret 3 tablets daily for x8 weeks. I will base the Hep C treatment POC on this start date;       Below is a summary of your treatment schedule. Please follow the schedule as closely as possible. Labs should be drawn as close to the date indicated at the [facility] or [facility].    Hepatitis C Treatment  Treatment:  Mavyret x8 weeks   Genotype: 1a  Treatment naive       Start Date: 03/28/18    Week 4  Hepatic panel, bmp Lab Due: 4/25/2018   Please have this lab drawn on or within a week after this date 4/25/18. You do not need to fast for this lab.     Week 8 - End of Treatment  HCV RNA Quant Lab Due: 5/23/2018  Please have this lab drawn on or within a week after this date 5/23/18. You will need to repeat this lab 3 months after completing treatment to ensure you have cleared the virus. Please see date for the final lab below. You do not need to fast for this lab.     3 Months Post Treatment  HCV RNA Quant Lab Due: 8/21/2018 Please have this lab drawn on the specified date of 8/21/18 or within a week after. This final lab will determine if you have cleared the Hepatitis C virus with Mavyret treatment. Without this final lab, we will be unable to determine if treatment was successful. You do not need to fast for this lab.       Educational information to patient on Hep C treatment;     -Contact the [facility] Hepatology clinic and speak with RN Care Coordinator prior to starting any new prescribed or OTC medications.   -Take medications exactly as prescribed, do not change dose or stop taking without consulting your provider.   -Take Medication one time each day with or without food  -If you miss a dose of medication, then take it as soon as you remember on the same day.  If not remembered on the same day, then skip the dose and take the next dose at the usual time. Do not take more than the recommended dose. Contact the clinic if you miss a dose.    Please contact the pharmacy 1-2 weeks prior to needing a medication refill.      Side Effects  The most common side effects of Hep C medication treatment can include:  -tiredness  -headache  Notify the clinic of any side effects that bother you or that do not go away.   Possible side effects have been discussed.   Patient has been instructed to clinic for rash, itching or unmanageable nausea.    How to store Hep C Treatment Medications  -Store Medication at room temperature below 86 degrees F  -Keep Medication in it's original container  -Do not use Medication if the seal is broken or missing    General information  It is not known if treatment will prevent you from infecting another person or reinfecting yourself with the hepatitis C virus during treatment. It is best that as soon as you start treatment to buy a new toothbrush, disposable razors (if you use a rotating shaver you do not need to buy a new one) and nail clippers. If you check your blood sugar at home, please dispose of the fingerstick needle after each use and DO NOT REUSE the insulin needles. These items should not be shared with anyone.        If you have any questions, please contact the main clinic at [phone] or your RN Care Coordinator at [phone]. We appreciate you choosing the [facility] clinic for your treatment. Patient agrees to Hep C treatment POC and verbalizes understanding. Patient will receive a copy of treatment plan in the mail, address verified with patient. Patient has no further questions or concerns. Hep C care team updated on patient status.          Elena Flannery RN, BSN, PHN  [facility]  Care Coordinator for Hepatology Clinic/Specialty Program

## 2018-04-30 DIAGNOSIS — B18.2 CHRONIC HEPATITIS C WITHOUT HEPATIC COMA (H): ICD-10-CM

## 2018-04-30 LAB
ERYTHROCYTE [DISTWIDTH] IN BLOOD BY AUTOMATED COUNT: 15.7 % (ref 10–15)
HCT VFR BLD AUTO: 30.1 % (ref 40–53)
HGB BLD-MCNC: 9.7 G/DL (ref 13.3–17.7)
INR PPP: 0.96 (ref 0.86–1.14)
MCH RBC QN AUTO: 32 PG (ref 26.5–33)
MCHC RBC AUTO-ENTMCNC: 32.2 G/DL (ref 31.5–36.5)
MCV RBC AUTO: 99 FL (ref 78–100)
PLATELET # BLD AUTO: 227 10E9/L (ref 150–450)
RBC # BLD AUTO: 3.03 10E12/L (ref 4.4–5.9)
WBC # BLD AUTO: 7.9 10E9/L (ref 4–11)

## 2018-04-30 PROCEDURE — 80076 HEPATIC FUNCTION PANEL: CPT | Performed by: INTERNAL MEDICINE

## 2018-04-30 PROCEDURE — 87522 HEPATITIS C REVRS TRNSCRPJ: CPT | Performed by: INTERNAL MEDICINE

## 2018-04-30 PROCEDURE — 85027 COMPLETE CBC AUTOMATED: CPT | Performed by: INTERNAL MEDICINE

## 2018-04-30 PROCEDURE — 36415 COLL VENOUS BLD VENIPUNCTURE: CPT | Performed by: INTERNAL MEDICINE

## 2018-04-30 PROCEDURE — 80048 BASIC METABOLIC PNL TOTAL CA: CPT | Performed by: INTERNAL MEDICINE

## 2018-04-30 PROCEDURE — 85610 PROTHROMBIN TIME: CPT | Performed by: INTERNAL MEDICINE

## 2018-05-01 ENCOUNTER — TELEPHONE (OUTPATIENT)
Dept: GASTROENTEROLOGY | Facility: CLINIC | Age: 58
End: 2018-05-01

## 2018-05-01 LAB
ALBUMIN SERPL-MCNC: 3.3 G/DL (ref 3.4–5)
ALP SERPL-CCNC: 58 U/L (ref 40–150)
ALT SERPL W P-5'-P-CCNC: 19 U/L (ref 0–70)
ANION GAP SERPL CALCULATED.3IONS-SCNC: 12 MMOL/L (ref 3–14)
AST SERPL W P-5'-P-CCNC: 17 U/L (ref 0–45)
BILIRUB DIRECT SERPL-MCNC: 0.1 MG/DL (ref 0–0.2)
BILIRUB SERPL-MCNC: 0.3 MG/DL (ref 0.2–1.3)
BUN SERPL-MCNC: 73 MG/DL (ref 7–30)
CALCIUM SERPL-MCNC: 8.5 MG/DL (ref 8.5–10.1)
CHLORIDE SERPL-SCNC: 105 MMOL/L (ref 94–109)
CO2 SERPL-SCNC: 22 MMOL/L (ref 20–32)
CREAT SERPL-MCNC: 17.1 MG/DL (ref 0.66–1.25)
GFR SERPL CREATININE-BSD FRML MDRD: 3 ML/MIN/1.7M2
GLUCOSE SERPL-MCNC: 116 MG/DL (ref 70–99)
POTASSIUM SERPL-SCNC: 4.1 MMOL/L (ref 3.4–5.3)
PROT SERPL-MCNC: 7.6 G/DL (ref 6.8–8.8)
SODIUM SERPL-SCNC: 139 MMOL/L (ref 133–144)

## 2018-05-01 NOTE — TELEPHONE ENCOUNTER
DATE:  5/1/2018   TIME OF RECEIPT FROM LAB:  10:05   LAB TEST:  Creatinine  LAB VALUE:  17.1  Consistent with previous creatinine 17.30, on dialysis.  RESULTS GIVEN WITH READ-BACK TO (PROVIDER):  RASHEEDA RAMOS  TIME LAB VALUE REPORTED TO PROVIDER:   10:10

## 2018-05-03 LAB
HCV RNA SERPL NAA+PROBE-ACNC: NORMAL [IU]/ML
HCV RNA SERPL NAA+PROBE-LOG IU: NORMAL LOG IU/ML

## 2018-05-29 DIAGNOSIS — R19.7 DIARRHEA, UNSPECIFIED TYPE: ICD-10-CM

## 2018-05-29 RX ORDER — LOPERAMIDE HYDROCHLORIDE 2 MG/1
2 TABLET ORAL 3 TIMES DAILY PRN
Qty: 30 TABLET | Refills: 3 | Status: SHIPPED | OUTPATIENT
Start: 2018-05-29 | End: 2018-01-01 | Stop reason: ALTCHOICE

## 2018-06-14 ENCOUNTER — MEDICAL CORRESPONDENCE (OUTPATIENT)
Dept: HEALTH INFORMATION MANAGEMENT | Facility: CLINIC | Age: 58
End: 2018-06-14

## 2018-07-03 ENCOUNTER — TELEPHONE (OUTPATIENT)
Dept: GASTROENTEROLOGY | Facility: CLINIC | Age: 58
End: 2018-07-03

## 2018-07-05 ENCOUNTER — TELEPHONE (OUTPATIENT)
Dept: DERMATOLOGY | Facility: CLINIC | Age: 58
End: 2018-07-05

## 2018-07-05 ENCOUNTER — OFFICE VISIT (OUTPATIENT)
Dept: DERMATOLOGY | Facility: CLINIC | Age: 58
End: 2018-07-05
Payer: MEDICAID

## 2018-07-05 DIAGNOSIS — L85.3 XEROSIS OF SKIN: Primary | ICD-10-CM

## 2018-07-05 DIAGNOSIS — L30.0 NUMMULAR ECZEMA: ICD-10-CM

## 2018-07-05 DIAGNOSIS — L21.9 DERMATITIS, SEBORRHEIC: ICD-10-CM

## 2018-07-05 RX ORDER — KETOCONAZOLE 20 MG/ML
SHAMPOO TOPICAL
Qty: 120 ML | Refills: 3 | Status: SHIPPED | OUTPATIENT
Start: 2018-07-05

## 2018-07-05 RX ORDER — TRIAMCINOLONE ACETONIDE 0.25 MG/G
CREAM TOPICAL
Qty: 80 G | Refills: 0 | Status: SHIPPED | OUTPATIENT
Start: 2018-07-05 | End: 2018-01-01

## 2018-07-05 RX ORDER — KETOCONAZOLE 20 MG/G
CREAM TOPICAL
Qty: 60 G | Refills: 1 | Status: SHIPPED | OUTPATIENT
Start: 2018-07-05 | End: 2018-01-01

## 2018-07-05 ASSESSMENT — PAIN SCALES - GENERAL: PAINLEVEL: NO PAIN (0)

## 2018-07-05 NOTE — TELEPHONE ENCOUNTER
[facility] Call Center    Phone Message    May a detailed message be left on voicemail: yes    Reason for Call: Medication Question or concern regarding medication   Prescription Clarification  Name of Medication: kenalog (creme)  Prescribing Provider: Dr. Jaxon Watson   Pharmacy: [facility] in [place]   What on the order needs clarification? Doctor's instructions          Action Taken: Message routed to:  [facility] ([facility] Dermatology Clinic
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
## 2018-07-05 NOTE — NURSING NOTE
Dermatology Rooming Note    Rommel Fan's goals for this visit include:   Chief Complaint   Patient presents with     Derm Problem     A R comes to clinic stating he has dry itchy patches behind his ears and on his legs.      Clari Costello LPN

## 2018-07-05 NOTE — PATIENT INSTRUCTIONS
For your dry skin (xerosis): please apply twice daily moisturizer such as cetaphil lotion, cerave lotion, or vanicream lotion  For your itchy spots on your legs: we will prescribe triamcinolone cream 0.025% for 3-4 days and once a week on the face if needed  For your itchy/painful/dry scalp and face: please use ketoconazole shampoo every other day and ketoconazole cream twice daily to affected areas.
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
## 2018-07-05 NOTE — MR AVS SNAPSHOT
After Visit Summary   7/5/2018    Rommel Fan    MRN: [id number]           Patient Information     Date Of Birth          1960        Visit Information        Provider Department      7/5/2018 12:45 PM Jaxon Watson MD [facility]        Today's Diagnoses     Xerosis of skin    -  1    Nummular eczema        Dermatitis, seborrheic          Care Instructions    For your dry skin (xerosis): please apply twice daily moisturizer such as cetaphil lotion, cerave lotion, or vanicream lotion  For your itchy spots on your legs: we will prescribe triamcinolone cream 0.025% for 3-4 days and once a week on the face if needed  For your itchy/painful/dry scalp and face: please use ketoconazole shampoo every other day and ketoconazole cream twice daily to affected areas.             Follow-ups after your visit        Follow-up notes from your care team     Return in about 3 months (around 10/5/2018) for Routine Visit.      Your next 10 appointments already scheduled     Jul 06, 2018 10:00 AM CDT   (Arrive by 9:45 AM)   New Patient Visit with Wes Guerra   [facility] ([facility])    [address] [place] [address]  4th [facility] [address] [phone]            Sep 13, 2018  3:15 PM CDT   (Arrive by 3:00 PM)   Return Visit with Jaxon Watson MD   [facility] ([facility])    [address] [place] [address]  3rd [facility] [address] [phone]              Who to contact     Please call your clinic at [phone] to:    Ask questions about your health    Make or cancel appointments    Discuss your medicines    Learn about your test results    Speak to your doctor            Additional Information About Your Visit        MyChart Information     Navmiit gives you secure access to your electronic health record. If you see a primary care provider, you can also send messages to your care team and  make appointments. If you have questions, please call your primary care clinic.  If you do not have a primary care provider, please call [phone] and they will assist you.      HERCAMOSHOP is an electronic gateway that provides easy, online access to your medical records. With HERCAMOSHOP, you can request a clinic appointment, read your test results, renew a prescription or communicate with your care team.     To access your existing account, please contact your [facility] or call [phone] for assistance.        Care EveryWhere ID     This is your Care EveryWhere ID. This could be used by other organizations to access your [facility] medical records  [id number]         Blood Pressure from Last 3 Encounters:   03/27/18 (!) 145/98   12/29/17 138/81   09/20/17 135/85    Weight from Last 3 Encounters:   06/29/18 97.1 kg (214 lb)   03/27/18 95.9 kg (211 lb 6.4 oz)   12/29/17 99.9 kg (220 lb 3.2 oz)              Today, you had the following     No orders found for display         Today's Medication Changes          These changes are accurate as of 7/5/18  2:02 PM.  If you have any questions, ask your nurse or doctor.               Start taking these medicines.        Dose/Directions    * ketoconazole 2 % shampoo   Commonly known as:  NIZORAL   Used for:  Dermatitis, seborrheic        Please use every other day to scalp and affected areas of face   Quantity:  120 mL   Refills:  3       * ketoconazole 2 % cream   Commonly known as:  NIZORAL   Used for:  Dermatitis, seborrheic        Please use twice daily for 3 months   Quantity:  60 g   Refills:  1       * Notice:  This list has 2 medication(s) that are the same as other medications prescribed for you. Read the directions carefully, and ask your doctor or other care provider to review them with you.      These medicines have changed or have updated prescriptions.        Dose/Directions    * triamcinolone 0.025 % cream   Commonly known as:   KENALOG   This may have changed:  Another medication with the same name was added. Make sure you understand how and when to take each.        Apply topically 2 times daily   Refills:  0       * triamcinolone 0.025 % cream   Commonly known as:  KENALOG   This may have changed:  You were already taking a medication with the same name, and this prescription was added. Make sure you understand how and when to take each.   Used for:  Xerosis of skin, Nummular eczema        Please use to affected areas of legs 3-4 days, once weekly to affected areas of the face   Quantity:  80 g   Refills:  0       * Notice:  This list has 2 medication(s) that are the same as other medications prescribed for you. Read the directions carefully, and ask your doctor or other care provider to review them with you.         Where to get your medicines      These medications were sent to [address] [address]     Phone:  [phone]     ketoconazole 2 % cream    ketoconazole 2 % shampoo    triamcinolone 0.025 % cream                Primary Care Provider Office Phone # Fax #    Chris Barraza [phone]       [facility] [address]  [facility] [address]        Equal Access to Services     SONYA BONILLA AH: Hadii diane sun hadasho Soomaali, waaxda luqadaha, qaybta kaalmada adeegyada, elpidio mendez. So [facility] [phone].    ATENCIÓN: Si habla español, tiene a das disposición servicios gratuitos de asistencia lingüística. Llame al [phone].    We comply with applicable federal civil rights laws and [place] laws. We do not discriminate on the basis of race, color, national origin, age, disability, sex, sexual orientation, or gender identity.            Thank you!     Thank you for choosing [facility]  for your care. Our goal is always to provide you with excellent care. Hearing back from our patients is one  way we can continue to improve our services. Please take a few minutes to complete the written survey that you may receive in the mail after your visit with us. Thank you!             Your Updated Medication List - Protect others around you: Learn how to safely use, store and throw away your medicines at www.disposemymeds.org.          This list is accurate as of 7/5/18  2:02 PM.  Always use your most recent med list.                   Brand Name Dispense Instructions for use Diagnosis    albuterol 108 (90 Base) MCG/ACT Inhaler    PROAIR HFA/PROVENTIL HFA/VENTOLIN HFA     Inhale 2 puffs into the lungs every 6 hours as needed        ALLOPURINOL PO      Take 100 mg by mouth daily        alpha-d-galactosidase tablet      Take 1 tablet by mouth as needed for intestinal gas        amLODIPine 10 MG tablet    NORVASC     Take 1 tablet by mouth daily.        aspirin 81 MG EC tablet     91 tablet    Take 1 tablet (81 mg) by mouth daily    Bilateral claudication of lower limb (H), Hypertension secondary to other renal disorders       CALCIUM ANTACID PO      Take 1,000 mg by mouth        carvedilol 12.5 MG tablet    COREG    180 tablet    Take 25 mg by mouth daily    Hypertension secondary to other renal disorders, Cough, Shortness of breath       CHANTIX PO      Take 1 mg by mouth 2 times daily as needed        COLCRYS PO      Take 0.6 mg by mouth 2 times daily        DIALYVITE PO      Take by mouth daily        dianeal PD-2.5% dex (calcium 3.5 mEq/L) 396 MOSM/L Soln CAPD     1000 mL    1,000 mLs by Dialysis route once for 1 dose    PD catheter dysfunction, initial encounter (H), ESRD (end stage renal disease) (H)       fish oil-omega-3 fatty acids 1000 MG capsule      Take 2 g by mouth daily        FUROSEMIDE PO      Take 80 mg by mouth daily        GABAPENTIN PO      Take 100 mg by mouth At Bedtime        Glecaprevir-Pibrentasvir 100-40 MG Tabs    MAVYRET    28 tablet    Take 1 capsule by mouth daily    Chronic hepatitis C  without hepatic coma (H)       hydrALAZINE 100 MG Tabs tablet    APRESOLINE    90 tablet    Take 1 tablet by mouth 3 times daily.    Hypertension       isosorbide mononitrate 60 MG 24 hr tablet    IMDUR    180 tablet    Take 1 tablet (60 mg) by mouth 2 times daily DO NOT TAKE WITH SILDENAFIL (viagra)    HTN (hypertension)       * ketoconazole 2 % shampoo    NIZORAL    120 mL    Please use every other day to scalp and affected areas of face    Dermatitis, seborrheic       * ketoconazole 2 % cream    NIZORAL    60 g    Please use twice daily for 3 months    Dermatitis, seborrheic       Lidocaine HCl 2 % Crea     60 mL    Externally apply 1 dose topically daily    Pain in limb, Plantar fascial fibromatosis       loperamide 2 MG tablet    IMODIUM A-D    30 tablet    Take 1 tablet (2 mg) by mouth 3 times daily as needed Take 2mg (1 tablet) 30 minutes before eating.    Diarrhea, unspecified type       losartan 100 MG tablet    COZAAR    30 tablet    Take 1 tablet by mouth daily.    Hypertension       MAGNESIUM OXIDE PO      Take 400 mg by mouth daily        MINOXIDIL PO      Take 2.5 mg by mouth daily        NEPRO Liqd     90 each    Take 1 Can by mouth daily    CKD (chronic kidney disease) stage 5, GFR less than 15 ml/min (H), Protein-calorie malnutrition (H)       niacin 500 MG CR tablet    SLO-NIACIN     Take 1 tablet (500 mg) by mouth At Bedtime    Hypertriglyceridemia       omeprazole 40 MG capsule    priLOSEC     Take 1 capsule by mouth as needed        ONDANSETRON PO      Take 4 mg by mouth 3 times daily        OTHER MEDICAL SUPPLIES      CPAP therapy        PENLAC 8 % Soln   Generic drug:  ciclopirox      Apply to adjacent skin and affected nails daily.  Remove with alcohol every 7 days, then repeat.        POLYETHYLENE GLYCOL EX           psyllium 0.52 g capsule     540 capsule    Take 1 capsule (0.52 g) by mouth daily    Diarrhea, unspecified type       QUESTRAN PO      Take by mouth as needed        quiNINE 324  MG capsule     100 capsule    Take 1 capsule (324 mg) by mouth nightly as needed for moderate pain    Chronic hepatitis C without hepatic coma (H)       senna-docusate 8.6-50 MG per tablet    YASMINE-COLACE    50 tablet    Take 1-2 tablets by mouth 2 times daily as needed for constipation    Peritoneal dialysis status (H)       SYMBICORT IN      Inhale 2 puffs into the lungs 2 times daily        TRAZODONE HCL PO      Take 100 mg by mouth daily        * triamcinolone 0.025 % cream    KENALOG     Apply topically 2 times daily        * triamcinolone 0.025 % cream    KENALOG    80 g    Please use to affected areas of legs 3-4 days, once weekly to affected areas of the face    Xerosis of skin, Nummular eczema       * UNABLE TO FIND      1 tsp. MEDICATION NAME: calcium        * UNABLE TO FIND      MEDICATION NAME: Centamicin Cream prn        UNABLE TO FIND      MEDICATION NAME: Liquid Calcium 1 teaspoon w/ and between meals        VITAMIN D3 PO      Take 2,000 Units by mouth daily        ZETIA PO      Take 10 mg by mouth        * Notice:  This list has 6 medication(s) that are the same as other medications prescribed for you. Read the directions carefully, and ask your doctor or other care provider to review them with you.

## 2018-07-06 ENCOUNTER — TELEPHONE (OUTPATIENT)
Dept: GASTROENTEROLOGY | Facility: CLINIC | Age: 58
End: 2018-07-06

## 2018-07-06 ENCOUNTER — OFFICE VISIT (OUTPATIENT)
Dept: GASTROENTEROLOGY | Facility: CLINIC | Age: 58
End: 2018-07-06
Payer: MEDICAID

## 2018-07-06 ENCOUNTER — PRE VISIT (OUTPATIENT)
Dept: GASTROENTEROLOGY | Facility: CLINIC | Age: 58
End: 2018-07-06

## 2018-07-06 VITALS
BODY MASS INDEX: 32.81 KG/M2 | WEIGHT: 221.5 LBS | OXYGEN SATURATION: 99 % | DIASTOLIC BLOOD PRESSURE: 88 MMHG | RESPIRATION RATE: 16 BRPM | HEIGHT: 69 IN | HEART RATE: 74 BPM | SYSTOLIC BLOOD PRESSURE: 145 MMHG | TEMPERATURE: 98.1 F

## 2018-07-06 DIAGNOSIS — K52.9 CHRONIC DIARRHEA: Primary | ICD-10-CM

## 2018-07-06 RX ORDER — CLONAZEPAM 0.5 MG/1
TABLET ORAL
COMMUNITY
Start: 2017-10-27

## 2018-07-06 RX ORDER — NIACIN 500 MG/1
TABLET, EXTENDED RELEASE ORAL
COMMUNITY
Start: 2018-07-02

## 2018-07-06 RX ORDER — MUPIROCIN 20 MG/G
OINTMENT TOPICAL
COMMUNITY
Start: 2018-07-02

## 2018-07-06 RX ORDER — LANCETS 28 GAUGE
EACH MISCELLANEOUS
COMMUNITY
Start: 2018-07-02

## 2018-07-06 RX ORDER — CYCLOBENZAPRINE HCL 10 MG
TABLET ORAL
COMMUNITY
Start: 2018-07-03

## 2018-07-06 RX ORDER — ALBUTEROL SULFATE 0.83 MG/ML
SOLUTION RESPIRATORY (INHALATION)
COMMUNITY
Start: 2018-07-03

## 2018-07-06 RX ORDER — SILDENAFIL 50 MG/1
0.5 TABLET, FILM COATED ORAL
COMMUNITY
Start: 2016-08-02 | End: 2018-01-01

## 2018-07-06 RX ORDER — LIDOCAINE 50 MG/G
OINTMENT TOPICAL
COMMUNITY
Start: 2018-07-02

## 2018-07-06 RX ORDER — GENTAMICIN SULFATE 1 MG/G
CREAM TOPICAL
COMMUNITY
Start: 2018-05-26

## 2018-07-06 RX ORDER — GLECAPREVIR AND PIBRENTASVIR 40; 100 MG/1; MG/1
TABLET, FILM COATED ORAL
COMMUNITY
Start: 2018-04-18

## 2018-07-06 RX ORDER — VENLAFAXINE HYDROCHLORIDE 37.5 MG/1
CAPSULE, EXTENDED RELEASE ORAL
COMMUNITY
Start: 2018-05-26

## 2018-07-06 ASSESSMENT — ENCOUNTER SYMPTOMS
HALLUCINATIONS: 0
STIFFNESS: 0
POOR WOUND HEALING: 0
POSTURAL DYSPNEA: 0
FATIGUE: 1
POLYDIPSIA: 0
WEIGHT GAIN: 0
MYALGIAS: 0
POLYPHAGIA: 0
BOWEL INCONTINENCE: 0
DECREASED CONCENTRATION: 1
SKIN CHANGES: 0
ABDOMINAL PAIN: 0
JAUNDICE: 0
HEMOPTYSIS: 0
TASTE DISTURBANCE: 1
RECTAL PAIN: 0
HOARSE VOICE: 0
PANIC: 1
NECK PAIN: 0
SNORES LOUDLY: 1
COUGH: 1
DEPRESSION: 1
COUGH DISTURBING SLEEP: 0
MUSCLE WEAKNESS: 0
JOINT SWELLING: 0
WHEEZING: 1
SHORTNESS OF BREATH: 1
INSOMNIA: 1
NECK MASS: 0
INCREASED ENERGY: 1
DIARRHEA: 1
SINUS PAIN: 1
BLOOD IN STOOL: 0
NERVOUS/ANXIOUS: 1
MUSCLE CRAMPS: 0
CHILLS: 1
NIGHT SWEATS: 1
BLOATING: 0
DECREASED APPETITE: 1
VOMITING: 0
WEIGHT LOSS: 1
SMELL DISTURBANCE: 0
BACK PAIN: 0
NAIL CHANGES: 0
ARTHRALGIAS: 0
NAUSEA: 0
ALTERED TEMPERATURE REGULATION: 1
FEVER: 0
DYSPNEA ON EXERTION: 0
CONSTIPATION: 1
SORE THROAT: 0
SINUS CONGESTION: 1
SPUTUM PRODUCTION: 1
HEARTBURN: 1
TROUBLE SWALLOWING: 0

## 2018-07-06 ASSESSMENT — PAIN SCALES - GENERAL: PAINLEVEL: NO PAIN (0)

## 2018-07-06 NOTE — PATIENT INSTRUCTIONS
It was a pleasure taking care of you today.  I've included a brief summary of our discussion and care plan from today's visit below.  Please review this information with your primary care provider.  _______________________________________________________________________    My recommendations are summarized as follows:  1. Go to lab for blood work. Send in a stool sample.   2. Schedule colonoscopy. If the lab work points to a different problem, we can cancel the colonoscopy.   3. Take loperamide 2 to 4 mg as needed for diarrhea, up to 16mg per day.   4. If the above workup is unremarkable, we will talk about having you visit with our pharmacist to review your medications.  --    Return to GI Clinic in 3 months to review your progress.    _______________________________________________________________________    Who do I call with any questions after my visit?  Please be in touch if there are any further questions that arise following today's visit.  There are multiple ways to contact your gastroenterology care team.        During business hours, you may reach a Gastroenterology nurse at [phone] and choose option 3.         To schedule or reschedule an appointment, please call [phone].       You can always send a secure message through Gocella.  Gocella messages are answered by your nurse or doctor typically within 24 hours.  Please allow extra time on weekends and holidays.        For urgent/emergent questions after business hours, you may reach the on-call GI Fellow by contacting the [facility]  at [phone].     How will I get the results of any tests ordered?    You will receive all of your results.  If you have signed up for Gocella, any tests ordered at your visit will be available to you after your physician reviews them.  Typically this takes 1-2 weeks.  If there are urgent results that require a change in your care plan, your physician or nurse will call you to discuss the next  steps.      What is [email or web address]?  LoiLo is a secure way for you to access all of your healthcare records from the [facility].  It is a web based computer program, so you can sign on to it from any location.  It also allows you to send secure messages to your care team.  I recommend signing up for LoiLo access if you have not already done so and are comfortable with using a computer.      How to I schedule a follow-up visit?  If you did not schedule a follow-up visit today, please call [phone] to schedule a follow-up office visit.        Sincerely,    Wes Guerra MD  Fellow  [facility]  Division of Gastroenterology

## 2018-07-06 NOTE — PROGRESS NOTES
I performed a history and physical examination of the above patient and discussed the management with Dr. Guerra on 7/6/2018. I reviewed the note and there are no changes to the past medical, family or social history.  A complete 10 point review of systems was obtained. Please see the HPI for pertinent positives and negatives. All other systems were reviewed and were found to be negative. I agree with the documented findings and plan of care as outlined.    Alden Holguin MD  GI Attending  Pager: [phone]

## 2018-07-06 NOTE — PROGRESS NOTES
GI CLINIC VISIT    CC/REFERRING MD:  Sanford Menendez  REASON FOR CONSULTATION:   The pt is a 58 year old male who I was asked to see in consultation at the request of Dr. Sanford Menendez for chronic diarrhea.    ASSESSMENT/PLAN:  57-year-old man with HCV s/p SVR after Mavryet and end-stage renal disease secondary to hypertension requiring peritoneal dialysis who presents for chronic diarrhea.     Will start broadly, evaluating for malabsorptive, inflammatory, infectious etiologies, in addition to protein losing enteropathy. May be related to medications (magnesium oxide, niacin, ezetimibe). Differential also includes functional etiologies, exaggerated gastro-colic reflex. No red flags with the exception of nocturnal diarrhea. Good relief with loperamide, which he is taking only occasionally.    - TSH, celiac serologies  - C diff, Giardia  - Fecal elastase, qualitative fat, alpha 1 antitrypsin, calprotectin  - colonoscopy with random biopsies  - loperamide 2-4mg QID PRN  - consider [facility] pharmacy referral    RTC 3 months    Thank you for this consultation.  It was a pleasure to participate in the care of this patient; please contact us with any further questions.     Seen and discussed with attending, Dr Holguin.     Wes Guerra MD  Fellow  Division of Gastroenterology, Hepatology and Nutrition  [facility]      HPI  57-year-old man with HCV s/p SVR after Mavryet and end-stage renal disease secondary to hypertension requiring peritoneal dialysis who presents for chronic diarrhea.     Endorses 6+ years of diarrhea, mainly post-prandial, associated with urgency. Will have 4-5 watery brown bowel movements per day typically, although occasionally will have no BM in a day (once per month). He will have nocturnal defecation approximately 4 times per night more than half the week, although this responds very well to a half tablet of loperamide and he will have no nocturnal defecation. He uses cholestyramine if he has an  upset stomach which also helps although doesn't help much with diarrhea. No drinking. No smoking. No melena, hematochezia. One episode of incontinence in the last 6 months.     ROS:    No fevers or chills  No weight loss  No blurry vision, double vision or change in vision  No sore throat  No lymphadenopathy  No headache, paraesthesias, or weakness in a limb  No shortness of breath or wheezing  No chest pain or pressure  No arthralgias or myalgias  No rashes or skin changes  No odynophagia or dysphagia  No BRBPR, hematochezia, melena  No dysuria, frequency or urgency  No hot/cold intolerance  No anxiety or depression    PERTINENT PAST MEDICAL HISTORY:  Past Medical History:   Diagnosis Date     Asthma      Chronic renal failure      COPD (chronic obstructive pulmonary disease) (H)      Gastrointestinal problem      Hepatitis C      Hyperlipidemia LDL goal <100 4/3/2013     Hypertension      Hypertriglyceridemia 7/13/2015     Liver disease      Obstructive sleep apnea      Sinus problem      PREVIOUS SURGERIES:  Past Surgical History:   Procedure Laterality Date     CHOLECYSTECTOMY  2013     LAPAROSCOPIC INSERTION CATHETER PERITONEAL DIALYSIS N/A 3/16/2016    Procedure: LAPAROSCOPIC INSERTION CATHETER PERITONEAL DIALYSIS;  Surgeon: Winnie Colmenares MD;  Location: [facility] OR     SEPTOPLASTY, TURBINOPLASTY, COMBINED  Nov 2008    inferior turbinate reduction and septoplasty due to deviation to right     TURBINOPLASTY  July 2008    bilateral inferior turbinate reduction     TURBINOPLASTY  1/27/2014    Procedure: TURBINOPLASTY;  Bilateral Turbinate Reduction ;  Surgeon: Cindy Carrasquillo MD;  Location:  OR     PREVIOUS ENDOSCOPY:  Colonoscopy 8/23/16 at [facility]- unable to see result    ALLERGIES:  No Known Allergies    PERTINENT MEDICATIONS:    Current Outpatient Prescriptions:      albuterol (PROAIR HFA, PROVENTIL HFA, VENTOLIN HFA) 108 (90 BASE) MCG/ACT inhaler, Inhale 2 puffs into the lungs every 6 hours as needed , Disp: ,  Rfl:      ALLOPURINOL PO, Take 200 mg by mouth daily , Disp: , Rfl:      alpha-d-galactosidase (BEANO) tablet, Take 1 tablet by mouth as needed for intestinal gas, Disp: , Rfl:      amLODIPine (NORVASC) 10 MG tablet, Take 1 tablet by mouth daily., Disp: , Rfl:      aspirin EC 81 MG tablet, Take 1 tablet (81 mg) by mouth daily, Disp: 91 tablet, Rfl: 3     B Complex-C-Folic Acid (DIALYVITE PO), Take by mouth daily, Disp: , Rfl:      Budesonide-Formoterol Fumarate (SYMBICORT IN), Inhale 2 puffs into the lungs 2 times daily , Disp: , Rfl:      Calcium Carbonate Antacid (CALCIUM ANTACID PO), Take 1,000 mg by mouth, Disp: , Rfl:      carvedilol (COREG) 12.5 MG tablet, Take 25 mg by mouth daily , Disp: 180 tablet, Rfl: 3     Cholecalciferol (VITAMIN D3 PO), Take 2,000 Units by mouth daily , Disp: , Rfl:      Cholestyramine (QUESTRAN PO), Take by mouth as needed , Disp: , Rfl:      ciclopirox (PENLAC) 8 % SOLN, Apply to adjacent skin and affected nails daily.  Remove with alcohol every 7 days, then repeat., Disp: , Rfl:      clonazePAM (KLONOPIN) 0.5 MG tablet, , Disp: , Rfl:      cyclobenzaprine (FLEXERIL) 10 MG tablet, , Disp: , Rfl:      Ezetimibe (ZETIA PO), Take 10 mg by mouth, Disp: , Rfl:      FUROSEMIDE PO, Take 80 mg by mouth daily, Disp: , Rfl:      GABAPENTIN PO, Take 100 mg by mouth At Bedtime, Disp: , Rfl:      gentamicin (GARAMYCIN) 0.1 % cream, , Disp: , Rfl:      hydrALAZINE (APRESOLINE) 100 MG TABS, Take 1 tablet by mouth 3 times daily., Disp: 90 tablet, Rfl: 2     isosorbide mononitrate (IMDUR) 60 MG 24 hr tablet, Take 1 tablet (60 mg) by mouth 2 times daily DO NOT TAKE WITH SILDENAFIL (viagra), Disp: 180 tablet, Rfl: 3     ketoconazole (NIZORAL) 2 % cream, Please use twice daily for 3 months, Disp: 60 g, Rfl: 1     ketoconazole (NIZORAL) 2 % shampoo, Please use every other day to scalp and affected areas of face, Disp: 120 mL, Rfl: 3     lidocaine (XYLOCAINE) 5 % ointment, , Disp: , Rfl:      Lidocaine  HCl 2 % CREA, Externally apply 1 dose topically daily, Disp: 60 mL, Rfl: 0     loperamide (IMODIUM A-D) 2 MG tablet, Take 1 tablet (2 mg) by mouth 3 times daily as needed Take 2mg (1 tablet) 30 minutes before eating., Disp: 30 tablet, Rfl: 3     losartan (COZAAR) 100 MG tablet, Take 1 tablet by mouth daily., Disp: 30 tablet, Rfl: 2     MAGNESIUM OXIDE PO, Take 400 mg by mouth daily, Disp: , Rfl:      MAVYRET 100-40 MG TABS, , Disp: , Rfl:      MINOXIDIL PO, Take 2.5 mg by mouth daily, Disp: , Rfl:      mupirocin (BACTROBAN) 2 % ointment, , Disp: , Rfl:      niacin (NIASPAN) 500 MG CR tablet, , Disp: , Rfl:      niacin (SLO-NIACIN) 500 MG CR tablet, Take 1 tablet (500 mg) by mouth At Bedtime, Disp: , Rfl: 3     omeprazole (PRILOSEC) 40 MG capsule, Take 1 capsule by mouth as needed , Disp: , Rfl:      ONDANSETRON PO, Take 4 mg by mouth 3 times daily, Disp: , Rfl:      OTHER MEDICAL SUPPLIES, CPAP therapy, Disp: , Rfl:      psyllium 0.52 G capsule, Take 1 capsule (0.52 g) by mouth daily, Disp: 540 capsule, Rfl: 0     quiNINE 324 MG capsule, Take 1 capsule (324 mg) by mouth nightly as needed for moderate pain, Disp: 100 capsule, Rfl: 3     senna-docusate (YASMINE-COLACE) 8.6-50 MG per tablet, Take 1-2 tablets by mouth 2 times daily as needed for constipation, Disp: 50 tablet, Rfl: 0     TRAZODONE HCL PO, Take 100 mg by mouth daily, Disp: , Rfl:      triamcinolone (KENALOG) 0.025 % cream, Please use to affected areas of legs 3-4 days, once weekly to affected areas of the face, Disp: 80 g, Rfl: 0     UNABLE TO FIND, MEDICATION NAME: Liquid Calcium 1 teaspoon w/ and between meals, Disp: , Rfl:      UNABLE TO FIND, 1 tsp. MEDICATION NAME: calcium, Disp: , Rfl:      UNABLE TO FIND, MEDICATION NAME: Centamicin Cream prn, Disp: , Rfl:      venlafaxine (EFFEXOR-XR) 37.5 MG 24 hr capsule, , Disp: , Rfl:      albuterol (2.5 MG/3ML) 0.083% neb solution, , Disp: , Rfl:      WAYLON CONTOUR test strip, , Disp: , Rfl:      blood glucose  monitoring (PRODIGY TWIST TOP 28G) lancets, , Disp: , Rfl:      CALCIUM ANTACID ULTRA 1000 MG CHEW, , Disp: , Rfl:      Colchicine (COLCRYS PO), Take 0.6 mg by mouth 2 times daily, Disp: , Rfl:      fish oil-omega-3 fatty acids 1000 MG capsule, Take 2 g by mouth daily, Disp: , Rfl:      [facility] [employer] (DIANEAL PD-2.5%, CALCIUM 3.5 MEQ/L,) 396 MOSM/L SOLN, 1,000 mLs by Dialysis route once for 1 dose, Disp: 1000 mL, Rfl: 0     POLYETHYLENE GLYCOL EX, , Disp: , Rfl:      sildenafil (VIAGRA) 50 MG tablet, Take 0.5 tablets by mouth, Disp: , Rfl:      Varenicline Tartrate (CHANTIX PO), Take 1 mg by mouth 2 times daily as needed , Disp: , Rfl:   No current facility-administered medications for this visit.     Facility-Administered Medications Ordered in Other Visits:      hydrALAZINE (APRESOLINE) injection, , , PRN, Elolivia, Jason V, APRN CRNA, 10 mg at 01/27/14 0923    SOCIAL HISTORY:  Social History     Social History     Marital status: Single     Spouse name: Leonela     Number of children: 1     Years of education: N/A     Occupational History      Self Employed.     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     PHYSICAL EXAMINATION:  Constitutional: aaox3, cooperative, pleasant, not dyspneic/diaphoretic, no acute distress  Vitals reviewed: /88 (BP Location: Left arm, Patient Position: Sitting, Cuff Size: Adult Large)  Pulse 74  Temp 98.1  F (36.7  C) (Oral)  Resp 16  Ht 1.74 m (5' 8.5\")  Wt 100.5 kg (221 lb 8 oz)  SpO2 99%  BMI 33.19 kg/m2  Wt:   Wt Readings from Last 2 Encounters:   07/06/18 100.5 kg (221 lb 8 oz)   06/29/18 97.1 kg (214 lb)      Eyes: Sclera anicteric/injected  Ears/nose/mouth/throat: Normal oropharynx without ulcers or exudate, mucus membranes moist, hearing intact  Neck: supple  CV: No edema  Respiratory: Unlabored breathing  Lymph: No cervical lymphadenopathy  Abd: Nondistended, +bs, no hepatosplenomegaly, nontender, no peritoneal signs. +peritoneal dialysis catheter  Skin: warm, perfused, no jaundice  Psych: Normal affect  MSK: Normal gait    PERTINENT STUDIES:    Orders Only on 04/30/2018   Component Date Value Ref Range Status     Bilirubin Direct 04/30/2018 0.1  0.0 - 0.2 mg/dL Final     Bilirubin Total 04/30/2018 0.3  0.2 - 1.3 mg/dL Final     Albumin 04/30/2018 3.3* 3.4 - 5.0 g/dL Final     Protein Total 04/30/2018 7.6  6.8 - 8.8 g/dL Final     Alkaline Phosphatase 04/30/2018 58  40 - 150 U/L Final     ALT 04/30/2018 19  0 - 70 U/L Final     AST 04/30/2018 17  0 - 45 U/L Final     WBC 04/30/2018 7.9  4.0 - 11.0 10e9/L Final     RBC Count 04/30/2018 3.03* 4.4 - 5.9 10e12/L Final     Hemoglobin 04/30/2018 9.7* 13.3 - 17.7 g/dL Final     Hematocrit 04/30/2018 30.1* 40.0 - 53.0 % Final     MCV 04/30/2018 99  78 - 100 fl Final     MCH 04/30/2018 32.0  26.5 - 33.0 pg Final     MCHC 04/30/2018 32.2  31.5 - 36.5 g/dL Final     RDW 04/30/2018 15.7* 10.0 - 15.0 % Final     Platelet Count 04/30/2018 227  150 - 450 10e9/L Final     INR 04/30/2018 0.96  0.86 - 1.14 Final     Sodium 04/30/2018 139  133 - 144 mmol/L Final     " Potassium 04/30/2018 4.1  3.4 - 5.3 mmol/L Final     Chloride 04/30/2018 105  94 - 109 mmol/L Final     Carbon Dioxide 04/30/2018 22  20 - 32 mmol/L Final     Anion Gap 04/30/2018 12  3 - 14 mmol/L Final     Glucose 04/30/2018 116* 70 - 99 mg/dL Final     Urea Nitrogen 04/30/2018 73* 7 - 30 mg/dL Final     Creatinine 04/30/2018 17.10* 0.66 - 1.25 mg/dL Final     GFR Estimate 04/30/2018 3* >60 mL/min/1.7m2 Final     GFR Estimate If Black 04/30/2018 4* >60 mL/min/1.7m2 Final     Calcium 04/30/2018 8.5  8.5 - 10.1 mg/dL Final     HCV RNA Quant IU/ml 04/30/2018 HCV RNA Not Detected  HCVND^HCV RNA Not Detected [IU]/mL Final     Log of HCV RNA Qt 04/30/2018 Not Calculated  <1.2 Log IU/mL Final     Biopsy 8/23/16  DIAGNOSIS:      A.  Colon, Right Colon, polyps, endoscopic biopsy:  Fragments of     tubular adenoma, low grade dysplasia.           B.  Colon, Random Sites, endoscopic biopsy:  Colonic mucosa with     focal serrated epithelial changes.  No dysplasia.           C.  Colon, Sigmoid, polyp, endoscopic biopsy:  Colonic mucosa with     granulation tissue consistent with inflammatory polyp.

## 2018-07-06 NOTE — MR AVS SNAPSHOT
After Visit Summary   7/6/2018    Rommel Fan    MRN: [id number]           Patient Information     Date Of Birth          1960        Visit Information        Provider Department      7/6/2018 10:00 AM Wes Guerra [facility]        Today's Diagnoses     Chronic diarrhea    -  1      Care Instructions    It was a pleasure taking care of you today.  I've included a brief summary of our discussion and care plan from today's visit below.  Please review this information with your primary care provider.  _______________________________________________________________________    My recommendations are summarized as follows:  1. Go to lab for blood work. Send in a stool sample.   2. Schedule colonoscopy. If the lab work points to a different problem, we can cancel the colonoscopy.   3. Take loperamide 2 to 4 mg as needed for diarrhea, up to 16mg per day.   4. If the above workup is unremarkable, we will talk about having you visit with our pharmacist to review your medications.  --    Return to GI Clinic in 3 months to review your progress.    _______________________________________________________________________    Who do I call with any questions after my visit?  Please be in touch if there are any further questions that arise following today's visit.  There are multiple ways to contact your gastroenterology care team.        During business hours, you may reach a Gastroenterology nurse at [phone] and choose option 3.         To schedule or reschedule an appointment, please call [phone].       You can always send a secure message through MailWriter.  MailWriter messages are answered by your nurse or doctor typically within 24 hours.  Please allow extra time on weekends and holidays.        For urgent/emergent questions after business hours, you may reach the on-call GI Fellow by contacting the [facility] at [phone].     How will I get  the results of any tests ordered?    You will receive all of your results.  If you have signed up for MyChart, any tests ordered at your visit will be available to you after your physician reviews them.  Typically this takes 1-2 weeks.  If there are urgent results that require a change in your care plan, your physician or nurse will call you to discuss the next steps.      What is Shopline[employer]?  Bluetest is a secure way for you to access all of your healthcare records from the [facility].  It is a web based computer program, so you can sign on to it from any location.  It also allows you to send secure messages to your care team.  I recommend signing up for Bluetest access if you have not already done so and are comfortable with using a computer.      How to I schedule a follow-up visit?  If you did not schedule a follow-up visit today, please call [phone] to schedule a follow-up office visit.        Sincerely,    Wes Guerra MD  Fellow  [facility]  Division of Gastroenterology          Follow-ups after your visit        Additional Services     GASTROENTEROLOGY ADULT REF PROCEDURE ONLY       Rule out microscopic colitis                  Follow-up notes from your care team     Return in about 3 months (around 10/6/2018).      Your next 10 appointments already scheduled     Sep 13, 2018  3:15 PM CDT   (Arrive by 3:00 PM)   Return Visit with Jaxon Watson MD   [facility])    [address] [place] [address]  [address] [facility] [id number] [phone]            Oct 26, 2018 11:20 AM CDT   (Arrive by 11:05 AM)   Return Visit with Wes Guerra   [facility] ([facility])    [address] [place] [address]  [address] [facility] [id number] [phone]              Future tests that were ordered for you today     Open Future Orders        Priority Expected Expires Ordered    Calprotectin Feces  "Routine  7/6/2019 7/6/2018    Fat Stool Qual Random Collection Routine 7/6/2018 10/4/2018 7/6/2018    Giardia antigen Routine  7/6/2019 7/6/2018    Pancreatic Elastase Fecal Routine 7/6/2018 10/4/2018 7/6/2018    Clostridium difficile toxin B PCR Routine 7/6/2018 10/4/2018 7/6/2018    TSH with free T4 reflex Routine 7/6/2018 8/5/2018 7/6/2018            Who to contact     Please call your clinic at [phone] to:    Ask questions about your health    Make or cancel appointments    Discuss your medicines    Learn about your test results    Speak to your doctor            Additional Information About Your Visit        [employer] Information     [employer] gives you secure access to your electronic health record. If you see a primary care provider, you can also send messages to your care team and make appointments. If you have questions, please call your primary care clinic.  If you do not have a primary care provider, please call [phone] and they will assist you.      [employer] is an electronic gateway that provides easy, online access to your medical records. With [employer], you can request a clinic appointment, read your test results, renew a prescription or communicate with your care team.     To access your existing account, please contact your [facility] or call [phone] for assistance.        Care EveryWhere ID     This is your Care EveryWhere ID. This could be used by other organizations to access your Friend medical records  [id number]        Your Vitals Were     Pulse Temperature Respirations Height Pulse Oximetry BMI (Body Mass Index)    74 98.1  F (36.7  C) (Oral) 16 1.74 m (5' 8.5\") 99% 33.19 kg/m2       Blood Pressure from Last 3 Encounters:   07/06/18 145/88   03/27/18 (!) 145/98   12/29/17 138/81    Weight from Last 3 Encounters:   07/06/18 100.5 kg (221 lb 8 oz)   06/29/18 97.1 kg (214 lb)   03/27/18 95.9 kg (211 lb 6.4 oz)              We Performed the Following     " Alpha 1 antitrypsin stool     Deamidated Giladin Peptide Ernesto IgA IgG [NPJ1911]     GASTROENTEROLOGY ADULT REF PROCEDURE ONLY     Tissue transglutaminase ernesto IgA and IgG [[id number]]          Today's Medication Changes          These changes are accurate as of 7/6/18 11:23 AM.  If you have any questions, ask your nurse or doctor.               Stop taking these medicines if you haven't already. Please contact your care team if you have questions.     NEPRO Liqd   Stopped by:  Wes Guerra                    Primary Care Provider Office Phone # Fax #    Chris Barraza [phone]       [facility] [address]  [place] [facility] [address]        Equal Access to Services     MERVIN BONILLA : Hadii diane sun hadasho Sonicole, waaxda luqadaha, qaybta kaalmada adeegyada, elpidio mancini . So [facility] [phone].    ATENCIÓN: Si habla español, tiene a das disposición servicios gratuitos de asistencia lingüística. Llame al [phone].    We comply with applicable federal civil rights laws and [place] laws. We do not discriminate on the basis of race, color, national origin, age, disability, sex, sexual orientation, or gender identity.            Thank you!     Thank you for choosing [facility]  for your care. Our goal is always to provide you with excellent care. Hearing back from our patients is one way we can continue to improve our services. Please take a few minutes to complete the written survey that you may receive in the mail after your visit with us. Thank you!             Your Updated Medication List - Protect others around you: Learn how to safely use, store and throw away your medicines at www.disposemymeds.org.          This list is accurate as of 7/6/18 11:23 AM.  Always use your most recent med list.                   Brand Name Dispense Instructions for use Diagnosis    * albuterol 108 (90 Base) MCG/ACT Inhaler    PROAIR  HFA/PROVENTIL HFA/VENTOLIN HFA     Inhale 2 puffs into the lungs every 6 hours as needed        * albuterol (2.5 MG/3ML) 0.083% neb solution           ALLOPURINOL PO      Take 200 mg by mouth daily        alpha-d-galactosidase tablet      Take 1 tablet by mouth as needed for intestinal gas        amLODIPine 10 MG tablet    NORVASC     Take 1 tablet by mouth daily.        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 renal disorders, Cough, Shortness of breath       CHANTIX PO      Take 1 mg by mouth 2 times daily as needed        clonazePAM 0.5 MG tablet    klonoPIN          COLCRYS PO      Take 0.6 mg by mouth 2 times daily        cyclobenzaprine 10 MG tablet    FLEXERIL          DIALYVITE PO      Take by mouth daily        dianeal PD-2.5% dex (calcium 3.5 mEq/L) 396 MOSM/L Soln CAPD     1000 mL    1,000 mLs by Dialysis route once for 1 dose    PD catheter dysfunction, initial encounter (H), ESRD (end stage renal disease) (H)       fish oil-omega-3 fatty acids 1000 MG capsule      Take 2 g by mouth daily        FUROSEMIDE PO      Take 80 mg by mouth daily        GABAPENTIN PO      Take 100 mg by mouth At Bedtime        gentamicin 0.1 % cream    GARAMYCIN          hydrALAZINE 100 MG Tabs tablet    APRESOLINE    90 tablet    Take 1 tablet by mouth 3 times daily.    Hypertension       isosorbide mononitrate 60 MG 24 hr tablet    IMDUR    180 tablet    Take 1 tablet (60 mg) by mouth 2 times daily DO NOT TAKE WITH  SILDENAFIL (viagra)    HTN (hypertension)       * ketoconazole 2 % shampoo    NIZORAL    120 mL    Please use every other day to scalp and affected areas of face    Dermatitis, seborrheic       * ketoconazole 2 % cream    NIZORAL    60 g    Please use twice daily for 3 months    Dermatitis, seborrheic       lidocaine 5 % ointment    XYLOCAINE          Lidocaine HCl 2 % Crea     60 mL    Externally apply 1 dose topically daily    Pain in limb, Plantar fascial fibromatosis       loperamide 2 MG tablet    IMODIUM A-D    30 tablet    Take 1 tablet (2 mg) by mouth 3 times daily as needed Take 2mg (1 tablet) 30 minutes before eating.    Diarrhea, unspecified type       losartan 100 MG tablet    COZAAR    30 tablet    Take 1 tablet by mouth daily.    Hypertension       MAGNESIUM OXIDE PO      Take 400 mg by mouth daily        MAVYRET 100-40 MG Tabs   Generic drug:  Glecaprevir-Pibrentasvir           MINOXIDIL PO      Take 2.5 mg by mouth daily        mupirocin 2 % ointment    BACTROBAN          niacin 500 MG CR tablet    SLO-NIACIN     Take 1 tablet (500 mg) by mouth At Bedtime    Hypertriglyceridemia       niacin 500 MG CR tablet    NIASPAN          omeprazole 40 MG capsule    priLOSEC     Take 1 capsule by mouth as needed        ONDANSETRON PO      Take 4 mg by mouth 3 times daily        OTHER MEDICAL SUPPLIES      CPAP therapy        PENLAC 8 % Soln   Generic drug:  ciclopirox      Apply to adjacent skin and affected nails daily.  Remove with alcohol every 7 days, then repeat.        POLYETHYLENE GLYCOL EX           psyllium 0.52 g capsule     540 capsule    Take 1 capsule (0.52 g) by mouth daily    Diarrhea, unspecified type       QUESTRAN PO      Take by mouth as needed        quiNINE 324 MG capsule     100 capsule    Take 1 capsule (324 mg) by mouth nightly as needed for moderate pain    Chronic hepatitis C without hepatic coma (H)       senna-docusate 8.6-50 MG per tablet    YASMINE-COLACE    50 tablet    Take 1-2  tablets by mouth 2 times daily as needed for constipation    Peritoneal dialysis status (H)       sildenafil 50 MG tablet    VIAGRA     Take 0.5 tablets by mouth        SYMBICORT IN      Inhale 2 puffs into the lungs 2 times daily        TRAZODONE HCL PO      Take 100 mg by mouth daily        triamcinolone 0.025 % cream    KENALOG    80 g    Please use to affected areas of legs 3-4 days, once weekly to affected areas of the face    Xerosis of skin, Nummular eczema       * UNABLE TO FIND      1 tsp. MEDICATION NAME: calcium        * UNABLE TO FIND      MEDICATION NAME: Centamicin Cream prn        UNABLE TO FIND      MEDICATION NAME: Liquid Calcium 1 teaspoon w/ and between meals        venlafaxine 37.5 MG 24 hr capsule    EFFEXOR-XR          VITAMIN D3 PO      Take 2,000 Units by mouth daily        ZETIA PO      Take 10 mg by mouth        * Notice:  This list has 8 medication(s) that are the same as other medications prescribed for you. Read the directions carefully, and ask your doctor or other care provider to review them with you.

## 2018-07-06 NOTE — NURSING NOTE
"Chief Complaint   Patient presents with     Consult     Diarrhea       Vitals:    07/06/18 1004   BP: 145/88   BP Location: Left arm   Patient Position: Sitting   Cuff Size: Adult Large   Pulse: 74   Resp: 16   Temp: 98.1  F (36.7  C)   TempSrc: Oral   SpO2: 99%   Weight: 100.5 kg (221 lb 8 oz)   Height: 1.74 m (5' 8.5\")       Body mass index is 33.19 kg/(m^2).      Nia Ford LPN                          "

## 2018-08-06 PROBLEM — Z01.818 PRE-TRANSPLANT EVALUATION FOR ESRD (END STAGE RENAL DISEASE): Status: ACTIVE | Noted: 2018-01-01

## 2018-08-06 NOTE — PROGRESS NOTES
"Kidney Transplant Evaluation - 8/6/2018  Rommel Fan attended the pre-transplant patient education class today. He was accompanied by his wife. They were both very attentive and he asked a lot of questions.The [facility] website pre-transplant modules were viewed; class participants were educated on using the site.     Content reviewed:    Living Donation and how to access that program    Paired exchange    Kidney Donor Profile Index (KDPI)    Waiting list issues (right to decline without penalty, high PHS risk donors, what to expect when called with an offer)    Hospital experience,  length of stay , need to stay locally post-discharge (2-4 weeks)    Surgical options (with pictures)                             Post-surgery lifting and driving restrictions    Post-transplant routines, frequency of lab work and clinic visits    Need to stay locally post-discharge (2-4 weeks)    Role of Transplant Coordinator    Participants were informed of the benefits of transplant as well as potential risks such as infection, cancer, and death.  The need for total adherence with immunosuppression medications and following transplant regimens was stressed.  The overall evaluation/approval/listing process was reviewed.        The patient was provided with the following documents:  What You Need to Know About a Kidney Transplant  Adult Kidney Transplant - A Guide for Patients  SRTR Data Sheet - Kidney  Brochure - Kidney Allocation  Brochure - Multiple Listing and Waiting Time Transfer  What Every Patient Needs to Know (UNOS)  UNOS Facts and Figures  Finding a Donor  [facility] - Quick Start Guide  [facility] Consent  Receipt of Information form    Rommel Fan signed the  Receipt of Information for Organ Transplant Recipient.\" He was provided Luci Minor's business card and instructed to call with additional questions.      Summary    Team s concerns/comments: Team would like him to see a pulmonologist and " have a low dose chest CT with outside provider. Will need CD disc from [facility] of abdominal CT for review. Team is recommending a stress test.    Candidacy category: Yellow    Action/Plan: Continue with evaluation     Expected Selection Meeting Discussion: [place] 8/15/2018

## 2018-08-06 NOTE — LETTER
"2018       RE: Rommel Fan  [address]   [place] [address]     Dear Colleague,    Thank you for referring your patient, Rommel Fan, to the [facility]ID [facility]GAN TRANSPL[facility] at [facility]. Please see a copy of my visit note below.    Chief Complaint   Patient presents with     Transplant Evaluation     Kidney Eval      /66  Pulse 77  Temp 98.4  F (36.9  C) (Oral)  Ht 1.727 m (5' 8\")  Wt 101.8 kg (224 lb 6.4 oz)  SpO2 98%  BMI 34.12 kg/m2  Katie Field, [facility]      Transplant Surgery Consult Note     Medical record number: [id number]  YOB: 1960,   Consult requested by Dr. Castorena for evaluation of kidney transplant candidacy.    Assessment and Recommendations:Mr. Fan appears to be a good candidate for kidney transplantation and has a fair understanding of the risks and benefits of this approach to the management of renal failure. The following issues should be addressed prior to finalizing his transplant candidacy:     Weight loss, CT of abdomen and pelvis non contrast.     The majority of our visit was spent in counselling, discussing the medical and surgical risks of kidney transplantation. We discussed approximate wait time and how that is influenced by issues such as blood type and sensitization (PRA) and access to a living donor. I contrasted potential waiting time for living vs  donor kidneys from  normal (0-85%) or higher (%) kidney donor profile index (KDPI) donors and their associated outcomes. I would recommend this individual to consider kidneys from high KDPI donors. The reason for this decision is best summarized as: decreased dialysis related morbidity/mortality, accepting lower kidney graft survival rates. Potential surgical complications of kidney transplantation include bleeding, superficial or deep wound complications (infection, hernia, lymphocele), ureteral anastomotic failure (leak or stenosis), graft " thrombosis, need for reoperation and other issues such as cardiac complications, pneumonia, deep venous thrombosis, pulmonary embolism, post transplant diabetes and death. The potential for recurrent disease or need for retransplantation was also addressed. We discussed the possible need for ureteral stent (and subsequent removal), and the utility of protocol biopsy and laboratory studies to evaluate for rejection or recurrent disease. We discussed the risk of graft rejection, our center's average graft and patient survival rates, immunosuppression protocols, as well as the potential opportunity to participate in clinical trials.  We also discussed the average length of stay, recovery process, and posttransplant lab and monitoring protocol.  I emphasized the need for strict immunosuppression medication adherence and the potential for complications of immunosuppression such as skin cancer or lymphoma, as well as a very low but not zero risk of donor-derived disease transmission risks (infection, cancer). Mr. Fan asked good questions and his candidacy will be reviewed at our Multidisciplinary Selection Committee. Thank you for the opportunity to participate in Mr. Fan's care.      Total time: 45 minutes  Counselling time: 40 minutes      Maria E Gomez CNP       ---------------------------------------------------------------------------------------------------    HPI: Mr. Fan has End stage renal failure due to Hypertension. The patient is non-diabetic.       The patient is on dialysis.    Has potential kidney donors:  No.  Interested in participation in paired exchange if a donor is willing: No    The patient has the following pertinent history:       No    Yes  Dialysis:    []      [x] via:    PD   Blood Transfusion                  [x]      []  Number of units:   Most recently:  Pregnancy:    [x]      [] Number:       Previous Transplant:  [x]      [] Details:    Cancer    [x]      [] Comment:   Kidney  stones   [x]      [] Comment:      Recurrent infections  [x]      []  Type:                  Bladder dysfunction  []      [x] Cause: Minimal urine production.  Around a cup daily.    Claudication   []      [x] Distance: few blocks   Previous Amputation  [x]      [] Cause:     Chronic anticoagulation  [x]      [] Indication:   Church  [x]      []      Past Medical History:   Diagnosis Date     Asthma 1979     COPD (chronic obstructive pulmonary disease) (H) 1988     Depressive disorder     currently on Effexor     End stage renal failure on dialysis (H)     Started dialysis 4/29/16     Gastrointestinal problem      Hepatitis C 2018    Treated this 2018     Hyperlipidemia LDL goal <100 4/3/2013     Hypertension      Hypertriglyceridemia 7/13/2015     Obstructive sleep apnea      Sinus problem      Past Surgical History:   Procedure Laterality Date     BIOPSY      Kidney @ [facility]     CHOLECYSTECTOMY  2013     COLONOSCOPY  2016    [place]     LAPAROSCOPIC INSERTION CATHETER PERITONEAL DIALYSIS N/A 3/16/2016    Procedure: LAPAROSCOPIC INSERTION CATHETER PERITONEAL DIALYSIS;  Surgeon: Winnie Colmenares MD;  Location: [facility] OR     SEPTOPLASTY, TURBINOPLASTY, COMBINED  Nov 2008    inferior turbinate reduction and septoplasty due to deviation to right     TURBINOPLASTY  July 2008    bilateral inferior turbinate reduction     TURBINOPLASTY  1/27/2014    Procedure: TURBINOPLASTY;  Bilateral Turbinate Reduction ;  Surgeon: Cindy Carrasquillo MD;  Location: [facility] OR     Family History   Problem Relation Age of Onset     Asthma Mother      Diabetes Mother      Hypertension Mother      Cerebrovascular Disease Mother      Cancer Mother      Alcohol/Drug Mother      HEART DISEASE Mother      Asthma Sister      Asthma Sister      Asthma Sister      Asthma Brother      Asthma Brother      Asthma Brother      HEART DISEASE Sister      Diabetes Sister      Social History     Social History     Marital status: Single     Spouse name:  Leonela     Number of children: 1     Years of education: N/A     Occupational History      Self Employed.     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  Positive for diarrhea, nausea or vomiting.   GENITOURINARY:  Positive for incontinence.   HEME:  No easy bruising  INTEGUMENT:  negative for rash. Positive for pruritis    NEURO:  Negative for headache, seizure disorder  Allergies:   No Known Allergies  Medications:  Prescription Medications as of 8/6/2018             albuterol (2.5 MG/3ML) 0.083% neb solution     albuterol (PROAIR HFA, PROVENTIL HFA, VENTOLIN HFA) 108 (90 BASE) MCG/ACT inhaler Inhale 2 puffs into the lungs every 6 hours as needed     ALLOPURINOL PO Take 200 mg by mouth daily     alpha-d-galactosidase (BEANO) tablet Take 1 tablet by mouth as needed for intestinal gas    amLODIPine (NORVASC) 10 MG tablet Take 1 tablet by mouth daily.    aspirin 81 MG EC tablet Take 1 tablet (81 mg) by mouth daily . Please be seen in clinic with labs for further refills.    B Complex-C-Folic Acid (DIALYVITE PO) Take by mouth daily    WAYLON CONTOUR test strip     blood glucose monitoring (PRODIGY TWIST TOP 28G) lancets     Budesonide-Formoterol Fumarate (SYMBICORT IN) Inhale 2 puffs into the lungs 2 times daily     CALCIUM ANTACID ULTRA 1000 MG  CHEW     Calcium Carbonate Antacid (CALCIUM ANTACID PO) Take 1,000 mg by mouth    carvedilol (COREG) 12.5 MG tablet Take 25 mg by mouth daily     Cholecalciferol (VITAMIN D3 PO) Take 2,000 Units by mouth daily     Cholestyramine (QUESTRAN PO) Take by mouth as needed     ciclopirox (PENLAC) 8 % SOLN Apply to adjacent skin and affected nails daily.  Remove with alcohol every 7 days, then repeat.    clonazePAM (KLONOPIN) 0.5 MG tablet     Colchicine (COLCRYS PO) Take 0.6 mg by mouth 2 times daily    cyclobenzaprine (FLEXERIL) 10 MG tablet     Ezetimibe (ZETIA PO) Take 10 mg by mouth    fish oil-omega-3 fatty acids 1000 MG capsule Take 2 g by mouth daily    FUROSEMIDE PO Take 80 mg by mouth daily    GABAPENTIN PO Take 100 mg by mouth At Bedtime    gentamicin (GARAMYCIN) 0.1 % cream     hydrALAZINE (APRESOLINE) 100 MG TABS Take 1 tablet by mouth 3 times daily.    isosorbide mononitrate (IMDUR) 60 MG 24 hr tablet Take 1 tablet (60 mg) by mouth 2 times daily DO NOT TAKE WITH SILDENAFIL (viagra)    ketoconazole (NIZORAL) 2 % cream Please use twice daily for 3 months    ketoconazole (NIZORAL) 2 % shampoo Please use every other day to scalp and affected areas of face    lidocaine (XYLOCAINE) 5 % ointment     Lidocaine HCl 2 % CREA Externally apply 1 dose topically daily    loperamide (IMODIUM A-D) 2 MG tablet Take 1 tablet (2 mg) by mouth 3 times daily as needed Take 2mg (1 tablet) 30 minutes before eating.    losartan (COZAAR) 100 MG tablet Take 1 tablet by mouth daily.    MAGNESIUM OXIDE PO Take 400 mg by mouth daily    MAVYRET 100-40 MG TABS     MINOXIDIL PO Take 2.5 mg by mouth daily    mupirocin (BACTROBAN) 2 % ointment     niacin (NIASPAN) 500 MG CR tablet     niacin (SLO-NIACIN) 500 MG CR tablet Take 1 tablet (500 mg) by mouth At Bedtime    omeprazole (PRILOSEC) 40 MG capsule Take 1 capsule by mouth as needed     ONDANSETRON PO Take 4 mg by mouth 3 times daily    OTHER MEDICAL SUPPLIES CPAP therapy    Peritoneal  [employer] (DIANEAL PD-2.5%, CALCIUM 3.5 MEQ/L,) 396 MOSM/L SOLN 1,000 mLs by Dialysis route once for 1 dose    POLYETHYLENE GLYCOL EX     psyllium 0.52 G capsule Take 1 capsule (0.52 g) by mouth daily    quiNINE 324 MG capsule Take 1 capsule (324 mg) by mouth nightly as needed for moderate pain    senna-docusate (YASMINE-COLACE) 8.6-50 MG per tablet Take 1-2 tablets by mouth 2 times daily as needed for constipation    sildenafil (VIAGRA) 50 MG tablet Take 0.5 tablets by mouth    TRAZODONE HCL PO Take 100 mg by mouth daily    triamcinolone (KENALOG) 0.025 % cream Please use to affected areas of legs 3-4 days, once weekly to affected areas of the face    UNABLE TO FIND MEDICATION NAME: Liquid Calcium 1 teaspoon w/ and between meals    UNABLE TO FIND 1 tsp. MEDICATION NAME: calcium    UNABLE TO FIND MEDICATION NAME: Centamicin Cream prn    Varenicline Tartrate (CHANTIX PO) Take 1 mg by mouth 2 times daily as needed     venlafaxine (EFFEXOR-XR) 37.5 MG 24 hr capsule       Facility Administered Medications as of 8/6/2018             hydrALAZINE (APRESOLINE) injection as needed for high blood pressure        Exam:   Temp:  [98.4  F (36.9  C)] 98.4  F (36.9  C)  Pulse:  [77] 77  BP: (115)/(66) 115/66  SpO2:  [98 %] 98 %  Appearance: in no apparent distress.   Skin: normal  Head and Neck: Normal, no rashes or jaundice  Respiratory: easy respirations, no audible wheezing.  Cardiovascular: RRR  Abdomen: obese, Surgical scars consistent with history   Extremeties: femoral 3+/3+, Edema, trace  Neuro: without deficit     Diagnostics:   No results found for this or any previous visit (from the past 672 hour(s)).  No results found for: CPRA     Attestation:  Seen with Ms Gomez  agree  Very pleasant man with ESRD (HTN) on PD.    1.Pulses down a bit (may be from body habitus).  Check noncontrast CT to look at iliacs.  Makes about 200 ml urine/day, no cells.  Other than arterial assessment and weight should be ok from  surgical perspective.  2. Obesity: should lose some weight.  See dietary  3. Hx of smoking, htn and esrd: high risk for cardiac disease.  Formal ischemic heart workup.  4.Recenty treated for HCV with apparent EVR, still waiting for time to assess SVR.  Do not see assessment for fibrosis. Should prob have a fibroscan.      Again, thank you for allowing me to participate in the care of your patient.      Sincerely,    REYES

## 2018-08-06 NOTE — PROGRESS NOTES
Outpatient MNT: Kidney Transplant Evaluation    Current BMI: 34.1 (HT 68 in,  lbs/102 kg)  BMI is within criteria of <35 for kidney transplant  Ideal BMI <35 or <229 lbs for kidney transplant      Time Spent: 30 minutes  Visit Type: Initial  Referring Physician: Dr Hanson   Pt accompanied by: his SOLeonela     History of previous txp: none   Dialysis: yes    Dialysis Modality: PD  Days/Times: daily   Dialysis Start: 3/2016  Pt receives protein supplement with dialysis: sometimes     Nutrition Assessment  Pt's SO cooks at home. She reports they do add salt, however, have cut down some.     Appetite: reduced     Vitamins, Supplements, Pertinent Meds: dialyvite, niacin, calcium antacid (for use of phos binder; inconsistent use)  Herbal Medicines/Supplements: none     Diet Recall  Breakfast Cereal or some boiled eggs    Lunch None    Dinner Beef stroganoff or ham s/w or burgers; pt reports his tastes may change detention through a meal and thus may not finish a meal    Snacks None    Beverages Sprite, occasional Pepsi/Coke, water, milk (not daily); on a 32 ml fluid restriction    Alcohol None    Dining out At least 3x/week - [employer] or other restaurant      Physical Activity  None      Anthropometrics  Height:   68 in   BMI:    34.1    Weight Status:Obesity Grade I BMI 30-34.9   Weight:  224 lbs            IBW (lb): 154  % IBW: 145    Wt Hx: Pt reports no ERICH. Pt reports dry weight after dialysis w/o clothes 212-213 lbs. Pt reports stable weight, but slowly losing d/t reduced appetite.     Adj/dosing BW: 172 lbs/78 kg       Labs  Recent Labs   Lab Test  09/19/17   0952  04/07/16   1159   06/29/15   0957  11/14/13   0831   CHOL  165  201*   < >  215*  174   HDL  36*  25*   < >  27*  34*   LDL  85  Cannot estimate LDL when triglyceride exceeds 400 mg/dL  72   < >  Cannot estimate LDL when triglyceride exceeds 400 mg/dL  71  Cannot estimate LDL when triglyceride exceeds 400 mg/dL  55   TRIG  222*  528*   < >   779*  517*   CHOLHDLRATIO   --    --    --   8.0*  5.1*    < > = values in this interval not displayed.     Lab Results   Component Value Date    A1C 5.7 01/06/2016    A1C 5.6 06/29/2015    A1C 5.4 11/14/2013    A1C 5.8 03/17/2011    A1C 5.4 11/11/2010     Potassium   Date Value Ref Range Status   04/30/2018 4.1 3.4 - 5.3 mmol/L Final     PHOSPHORUS: no recent level on file    Malnutrition  % Intake: Decreased intake does not meet criteria for malnutrition   % Weight Loss: Weight loss does not meet criteria for malnutrition   Subcutaneous Fat Loss: None  Muscle Loss: None  Fluid Accumulation/Edema: None noted  Malnutrition Diagnosis: Patient does not meet two of the above criteria necessary for diagnosing malnutrition     Estimated Nutrition Needs  Energy  6146-4470     (20-25 kcal/kg dosing BW for desired wt loss)       Protein      (1.2-1.4 g/kg for HD/PD needs)         Fluid  1 ml/kcal or per MD   Micronutrient   Na+: <2000 mg/day  K+: 6459-6072 mg/day  Phos: 800-1000 mg/day            Nutrition Diagnosis  Excessive Na+ intake r/t food and nutrition related knowledge deficit AEB diet recall reveals high Na+ foods.    Food and nutrition related knowledge deficit r/t pre kidney transplant eval AEB pt verbalized not hearing pre/post transplant diet guidelines.    Nutrition Intervention  Nutrition education provided:  Discussed sodium intake (low sodium foods and drinks, seasoning food without salt and tips for low sodium diet). Encouraged pt to reduce frequency of dining out d/t high sodium content of restaurant/fast foods. Also encouraged pt/SO to reduce amount of salt they add during cooking, in addition to monitoring use of canned soup, packaged foods in cooking, etc. Did provide pt with list of high/low K foods and high Phos foods. Pt reports never hearing about these restrictions before, but that he has met with his dialysis dietitian several times. He is unsure how his K/Phos labs have been. Encouraged  pt to use food lists as a guide after receiving lab values next time (ie if K is trending up or high, need to be stricter about limiting these foods, etc). Pt reports understanding of this.     Reviewed post txp diet guidelines in brief (will review in further detail post txp):  (1) Review of proper food safety measures d/t immunosuppressant therapy post-op and increased risk for food-borne illness    (2) Avoid the following post txp d/t risk for rejection, unknown effects on the organs, and/or potential interactions with immunosuppressants:  - Herbal, Chinese, holistic, chiropractic, natural, alternative medicines and supplements  - Detoxes and cleanses  - Weight loss pills  - Protein powders or other products with extracts or herbs (ie green tea extract)    (3) Med regimen and possible side effects    Patient Understanding: Pt verbalized understanding of education provided.  Expected Compliance: Good  Follow-Up Plans: PRN     Nutrition Goals  1. Limit Na+ <2000mg/day  2. Pt to verbalize understanding of 3 aspects of post txp education provided    Provided pt with contact info.   Annamaria Michelle RD, LD  Pgr [phone]

## 2018-08-06 NOTE — MR AVS SNAPSHOT
After Visit Summary   8/6/2018    Rommel Fan    MRN: [id number]           Patient Information     Date Of Birth          1960        Visit Information        Provider Department      8/6/2018 9:50 AM Marcia Alva RN [facility] Solid Organ Transplant        Today's Diagnoses     Awaiting organ transplant    -  1       Follow-ups after your visit        Your next 10 appointments already scheduled     Aug 06, 2018 12:40 PM CDT   ecg with  CV EKG   [facility] Xray ([facility])    [address] [place] [address] [id number] [phone]            Aug 06, 2018  1:00 PM CDT   Lab with  LAB   [facility] Lab ([facility])    [address] [place] [address] [id number] [phone]            Aug 06, 2018  1:30 PM CDT   FULL PULMONARY FUNCTION with  PFL B   [facility] Pulmonary Function Testing ([facility])    [address] [place] [address] [id number] [phone]            Aug 06, 2018  2:30 PM CDT   Ech Complete with [id number]   [facility] Echo ([facility])    [address] [place] [address] [id number] [phone]           1.  Please bring or wear a comfortable two-piece outfit. 2.  You may eat, drink and take your normal medicines. 3.  For any questions that cannot be answered, please contact the ordering physician 4.  Please do not wear perfumes or scented lotions on the day of your exam.            Aug 17, 2018  8:00 AM CDT   US AORTA/IVC/ILIAC DUPLEX COMPLETE with UCUSV1   [facility] Imaging Center US ([facility])    [address] [place] [id number] [phone]           Please bring a list of your medicines (including vitamins, minerals and over-the-counter drugs). Also, tell your doctor about any allergies you may have. Wear comfortable clothes and leave your  valuables at home.  Adults: No eating or drinking for 8 hours before the exam. You may take medicine with a small sip of water.  Children: - Infants, breast-fed: may have breast milk up to 2 hours before exam. - Infants, formula: may have bottle until 4 hours before exam. - Children 1-5 years: No food or drink for 4 hours before exam. - Children 6 -12 years: No food or drink for 6 hours before exam. - Children over 12 years: No food or drink for 8 hours before exam. - J Tube Fed: No need to stop feedings.  Please call the Imaging Department at your exam site with any questions.            Aug 17, 2018  9:00 AM CDT   US AORTIC IMAGING with UCUSV1   [facility] Imaging [place] US ([facility])    [address] [place] [address]  1st [facility] [id number] [phone]           Please bring a list of your medicines (including vitamins, minerals and over-the-counter drugs). Also, tell your doctor about any allergies you may have. Wear comfortable clothes and leave your valuables at home.  No eating or drinking for 8 hours before the exam. You may take medicine with a small sip of water.  Please call the Imaging Department at your exam site with any questions.            Sep 13, 2018  3:15 PM CDT   (Arrive by 3:00 PM)   Return Visit with Jaxon Watson MD   [facility] Dermatology ([facility])    [address] [place] [address]  3rd [facility] [address]   [phone]            Oct 26, 2018 11:20 AM CDT   (Arrive by 11:05 AM)   Return Visit with Wes Guerra   [facility] Gastroenterology and IBD Clinic ([facility])    [address] [place] [address]  4th Floor  [facility] [address]   [phone]            Nov 15, 2018  5:00 PM CST   (Arrive by 4:45 PM)   Return Visit with Cody Lloyd MD   [facility] Heart [facility] Emergency Department ([facility])    [address] [place] [address]  [address] [place] [address] [id number] [phone]              Who to  contact     If you have questions or need follow up information about today's clinic visit or your schedule please contact [facility] directly at [phone].  Normal or non-critical lab and imaging results will be communicated to you by MyChart, letter or phone within 4 business days after the clinic has received the results. If you do not hear from us within 7 days, please contact the clinic through [employer] or phone. If you have a critical or abnormal lab result, we will notify you by phone as soon as possible.  Submit refill requests through [employer] or call your pharmacy and they will forward the refill request to us. Please allow 3 business days for your refill to be completed.          Additional Information About Your Visit        [employer] Information     [employer] gives you secure access to your electronic health record. If you see a primary care provider, you can also send messages to your care team and make appointments. If you have questions, please call your primary care clinic.  If you do not have a primary care provider, please call [phone] and they will assist you.        Care EveryWhere ID     This is your Care EveryWhere ID. This could be used by other organizations to access your [facility] medical records  [id number]         Blood Pressure from Last 3 Encounters:   08/06/18 115/66   07/06/18 145/88   03/27/18 (!) 145/98    Weight from Last 3 Encounters:   08/06/18 101.8 kg (224 lb 6.4 oz)   07/06/18 100.5 kg (221 lb 8 oz)   07/10/18 97.5 kg (215 lb)              Today, you had the following     No orders found for display       Primary Care Provider Office Phone # Fax #    Chris Christi [phone]       [facility] [address]  [place] [facility] [address]        Equal Access to Services     SONYA BONILLA : Ana Cristina Martinez, jona velásquez, bernardo kaalhasmukh sorensen, elpidio mendez. So [facility]  [phone].    ATENCIÓN: Si vijaya jorge, tiene a das disposición servicios gratuitos de asistencia lingüística. Garcia auguste [phone].    We comply with applicable federal civil rights laws and [place] laws. We do not discriminate on the basis of race, color, national origin, age, disability, sex, sexual orientation, or gender identity.            Thank you!     Thank you for choosing [facility]  for your care. Our goal is always to provide you with excellent care. Hearing back from our patients is one way we can continue to improve our services. Please take a few minutes to complete the written survey that you may receive in the mail after your visit with us. Thank you!             Your Updated Medication List - Protect others around you: Learn how to safely use, store and throw away your medicines at www.disposemymeds.org.          This list is accurate as of 8/6/18 11:54 AM.  Always use your most recent med list.                   Brand Name Dispense Instructions for use Diagnosis    * albuterol 108 (90 Base) MCG/ACT Inhaler    PROAIR HFA/PROVENTIL HFA/VENTOLIN HFA     Inhale 2 puffs into the lungs every 6 hours as needed        * albuterol (2.5 MG/3ML) 0.083% neb solution           ALLOPURINOL PO      Take 200 mg by mouth daily        alpha-d-galactosidase tablet      Take 1 tablet by mouth as needed for intestinal gas        amLODIPine 10 MG tablet    NORVASC     Take 1 tablet by mouth daily.        aspirin 81 MG EC tablet     90 tablet    Take 1 tablet (81 mg) by mouth daily . Please be seen in clinic with labs for further refills.    Bilateral claudication of lower limb (H), Hypertension secondary to other renal disorders       WAYLON CONTOUR test strip   Generic drug:  blood glucose monitoring           blood glucose monitoring lancets           * CALCIUM ANTACID PO      Take 1,000 mg by mouth        * CALCIUM ANTACID ULTRA 1000 MG Chew   Generic drug:  calcium carbonate antacid            carvedilol 12.5 MG tablet    COREG    180 tablet    Take 25 mg by mouth daily    Hypertension secondary to other renal disorders, Cough, Shortness of breath       CHANTIX PO      Take 1 mg by mouth 2 times daily as needed        clonazePAM 0.5 MG tablet    klonoPIN          COLCRYS PO      Take 0.6 mg by mouth 2 times daily        cyclobenzaprine 10 MG tablet    FLEXERIL          DIALYVITE PO      Take by mouth daily        dianeal PD-2.5% dex (calcium 3.5 mEq/L) 396 MOSM/L Soln CAPD     1000 mL    1,000 mLs by Dialysis route once for 1 dose    PD catheter dysfunction, initial encounter (H), ESRD (end stage renal disease) (H)       fish oil-omega-3 fatty acids 1000 MG capsule      Take 2 g by mouth daily        FUROSEMIDE PO      Take 80 mg by mouth daily        GABAPENTIN PO      Take 100 mg by mouth At Bedtime        gentamicin 0.1 % cream    GARAMYCIN          hydrALAZINE 100 MG Tabs tablet    APRESOLINE    90 tablet    Take 1 tablet by mouth 3 times daily.    Hypertension       isosorbide mononitrate 60 MG 24 hr tablet    IMDUR    180 tablet    Take 1 tablet (60 mg) by mouth 2 times daily DO NOT TAKE WITH SILDENAFIL (viagra)    HTN (hypertension)       * ketoconazole 2 % shampoo    NIZORAL    120 mL    Please use every other day to scalp and affected areas of face    Dermatitis, seborrheic       * ketoconazole 2 % cream    NIZORAL    60 g    Please use twice daily for 3 months    Dermatitis, seborrheic       lidocaine 5 % ointment    XYLOCAINE          Lidocaine HCl 2 % Crea     60 mL    Externally apply 1 dose topically daily    Pain in limb, Plantar fascial fibromatosis       loperamide 2 MG tablet    IMODIUM A-D    30 tablet    Take 1 tablet (2 mg) by mouth 3 times daily as needed Take 2mg (1 tablet) 30 minutes before eating.    Diarrhea, unspecified type       losartan 100 MG tablet    COZAAR    30 tablet    Take 1 tablet by mouth daily.    Hypertension       MAGNESIUM OXIDE PO      Take 400 mg by mouth  daily        MAVYRET 100-40 MG Tabs   Generic drug:  Glecaprevir-Pibrentasvir           MINOXIDIL PO      Take 2.5 mg by mouth daily        mupirocin 2 % ointment    BACTROBAN          niacin 500 MG CR tablet    SLO-NIACIN     Take 1 tablet (500 mg) by mouth At Bedtime    Hypertriglyceridemia       niacin 500 MG CR tablet    NIASPAN          omeprazole 40 MG capsule    priLOSEC     Take 1 capsule by mouth as needed        ONDANSETRON PO      Take 4 mg by mouth 3 times daily        OTHER MEDICAL SUPPLIES      CPAP therapy        PENLAC 8 % Soln   Generic drug:  ciclopirox      Apply to adjacent skin and affected nails daily.  Remove with alcohol every 7 days, then repeat.        POLYETHYLENE GLYCOL EX           psyllium 0.52 g capsule     540 capsule    Take 1 capsule (0.52 g) by mouth daily    Diarrhea, unspecified type       QUESTRAN PO      Take by mouth as needed        quiNINE 324 MG capsule     100 capsule    Take 1 capsule (324 mg) by mouth nightly as needed for moderate pain    Chronic hepatitis C without hepatic coma (H)       senna-docusate 8.6-50 MG per tablet    YASMINE-COLACE    50 tablet    Take 1-2 tablets by mouth 2 times daily as needed for constipation    Peritoneal dialysis status (H)       sildenafil 50 MG tablet    VIAGRA     Take 0.5 tablets by mouth        SYMBICORT IN      Inhale 2 puffs into the lungs 2 times daily        TRAZODONE HCL PO      Take 100 mg by mouth daily        triamcinolone 0.025 % cream    KENALOG    80 g    Please use to affected areas of legs 3-4 days, once weekly to affected areas of the face    Xerosis of skin, Nummular eczema       * UNABLE TO FIND      1 tsp. MEDICATION NAME: calcium        * UNABLE TO FIND      MEDICATION NAME: Centamicin Cream prn        UNABLE TO FIND      MEDICATION NAME: Liquid Calcium 1 teaspoon w/ and between meals        venlafaxine 37.5 MG 24 hr capsule    EFFEXOR-XR          VITAMIN D3 PO      Take 2,000 Units by mouth daily        ZETIA PO       Take 10 mg by mouth        * Notice:  This list has 8 medication(s) that are the same as other medications prescribed for you. Read the directions carefully, and ask your doctor or other care provider to review them with you.

## 2018-08-06 NOTE — MR AVS SNAPSHOT
After Visit Summary   8/6/2018    Rommel Fan    MRN: [id number]           Patient Information     Date Of Birth          1960        Visit Information        Provider Department      8/6/2018 10:30 AM  PKE PATIENT 4 M [facility] Solid Organ Transplant        Today's Diagnoses     Pre-transplant evaluation for ESRD (end stage renal disease)           Follow-ups after your visit        Your next 10 appointments already scheduled     Aug 17, 2018  8:00 AM CDT   US AORTA/IVC/ILIAC DUPLEX COMPLETE with UCUSV1   [facility] Imaging [facility] ([facility])    [address] [place] [id number] [phone]           Please bring a list of your medicines (including vitamins, minerals and over-the-counter drugs). Also, tell your doctor about any allergies you may have. Wear comfortable clothes and leave your valuables at home.  Adults: No eating or drinking for 8 hours before the exam. You may take medicine with a small sip of water.  Children: - Infants, breast-fed: may have breast milk up to 2 hours before exam. - Infants, formula: may have bottle until 4 hours before exam. - Children 1-5 years: No food or drink for 4 hours before exam. - Children 6 -12 years: No food or drink for 6 hours before exam. - Children over 12 years: No food or drink for 8 hours before exam. - J Tube Fed: No need to stop feedings.  Please call the Imaging Department at your exam site with any questions.            Aug 17, 2018  9:00 AM CDT   US AORTIC IMAGING with [address] [facility]  ([facility])    [address] [id number] [phone]           Please bring a list of your medicines (including vitamins, minerals and over-the-counter drugs). Also, tell your doctor about any allergies you may have. Wear comfortable clothes and leave your valuables at home.  No eating or drinking for 8 hours before the  exam. You may take medicine with a small sip of water.  Please call the Imaging Department at your exam site with any questions.            Sep 13, 2018  3:15 PM CDT   (Arrive by 3:00 PM)   Return Visit with Jaxon Watson MD   [facility] Dermatology ([facility])    [address] [place] [address]  3rd Floor  [facility] [address]   [phone]            Oct 26, 2018 11:20 AM CDT   (Arrive by 11:05 AM)   Return Visit with Wes Guerra   [facility] Gastroenterology and IBD Clinic ([facility])    [address] [facility]  4th Floor  [facility] [address] [phone]            Nov 15, 2018  5:00 PM CST   (Arrive by 4:45 PM)   Return Visit with Cody Lloyd MD   [facility] Heart Care ([facility])    [address] [place] [address]  [address]  [facility] [address]   [phone]              Future tests that were ordered for you today     Open Future Orders        Priority Expected Expires Ordered    CT Abdomen Pelvis w/o Contrast STAT  8/6/2019 8/6/2018            Who to contact     If you have questions or need follow up information about today's clinic visit or your schedule please contact [facility] directly at [phone].  Normal or non-critical lab and imaging results will be communicated to you by MyChart, letter or phone within 4 business days after the clinic has received the results. If you do not hear from us within 7 days, please contact the clinic through MyChart or phone. If you have a critical or abnormal lab result, we will notify you by phone as soon as possible.  Submit refill requests through [employer] or call your pharmacy and they will forward the refill request to us. Please allow 3 business days for your refill to be completed.          Additional Information About Your Visit        [employer] Information     [employer] gives you secure access to your electronic health record. If you see a primary care provider,  you can also send messages to your care team and make appointments. If you have questions, please call your primary care clinic.  If you do not have a primary care provider, please call [phone] and they will assist you.        Care EveryWhere ID     This is your Care EveryWhere ID. This could be used by other organizations to access your [facility] medical records  [id number]         Blood Pressure from Last 3 Encounters:   08/06/18 115/66   07/06/18 145/88   03/27/18 (!) 145/98    Weight from Last 3 Encounters:   08/06/18 101.8 kg (224 lb 6.4 oz)   07/06/18 100.5 kg (221 lb 8 oz)   07/10/18 97.5 kg (215 lb)              Today, you had the following     No orders found for display       Primary Care Provider Office Phone # Fax #    Chris Barraza [phone]       \[facility] [place] [facility]\"" FAMILY [facility]ACTICE [address]  [place] [facility] [address]        Equal Access to Services     SONYA BONILLA : Hadii aad ku hadasho Soomaali, waaxda luqadaha, qaybta kaalmada adeegyada, waxay idiin hayaan isaaceg daria garber'celeste . So [facility] [phone].    ATENCIÓN: Si habla español, tiene a das disposición servicios gratuitos de asistencia lingüística. Llame al [phone].    We comply with applicable federal civil rights laws and [place] laws. We do not discriminate on the basis of race, color, national origin, age, disability, sex, sexual orientation, or gender identity.            Thank you!     Thank you for choosing [facility]  for your care. Our goal is always to provide you with excellent care. Hearing back from our patients is one way we can continue to improve our services. Please take a few minutes to complete the written survey that you may receive in the mail after your visit with us. Thank you!             Your Updated Medication List - Protect others around you: Learn how to safely use, store and throw away your medicines at www.disposemymeds.org.          This list is accurate as of 8/6/18   9:59 PM.  Always use your most recent med list.                   Brand Name Dispense Instructions for use Diagnosis    * albuterol 108 (90 Base) MCG/ACT Inhaler    PROAIR HFA/PROVENTIL HFA/VENTOLIN HFA     Inhale 2 puffs into the lungs every 6 hours as needed        * albuterol (2.5 MG/3ML) 0.083% neb solution           ALLOPURINOL PO      Take 200 mg by mouth daily        alpha-d-galactosidase tablet      Take 1 tablet by mouth as needed for intestinal gas        amLODIPine 10 MG tablet    NORVASC     Take 1 tablet by mouth daily.        aspirin 81 MG EC tablet     90 tablet    Take 1 tablet (81 mg) by mouth daily . Please be seen in clinic with labs for further refills.    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 renal disorders, Cough, Shortness of breath       CHANTIX PO      Take 1 mg by mouth 2 times daily as needed        clonazePAM 0.5 MG tablet    klonoPIN          COLCRYS PO      Take 0.6 mg by mouth 2 times daily        cyclobenzaprine 10 MG tablet    FLEXERIL          DIALYVITE PO      Take by mouth daily        dianeal PD-2.5% dex (calcium 3.5 mEq/L) 396 MOSM/L Soln CAPD     1000 mL    1,000 mLs by Dialysis route once for 1 dose    PD catheter dysfunction, initial encounter (H), ESRD (end stage renal disease) (H)       fish oil-omega-3 fatty acids 1000 MG capsule      Take 2 g by mouth daily        FUROSEMIDE PO      Take 80 mg by mouth daily        GABAPENTIN PO      Take 100 mg by mouth At Bedtime        gentamicin 0.1 % cream    GARAMYCIN          hydrALAZINE 100 MG Tabs  tablet    APRESOLINE    90 tablet    Take 1 tablet by mouth 3 times daily.    Hypertension       isosorbide mononitrate 60 MG 24 hr tablet    IMDUR    180 tablet    Take 1 tablet (60 mg) by mouth 2 times daily DO NOT TAKE WITH SILDENAFIL (viagra)    HTN (hypertension)       * ketoconazole 2 % shampoo    NIZORAL    120 mL    Please use every other day to scalp and affected areas of face    Dermatitis, seborrheic       * ketoconazole 2 % cream    NIZORAL    60 g    Please use twice daily for 3 months    Dermatitis, seborrheic       lidocaine 5 % ointment    XYLOCAINE          Lidocaine HCl 2 % Crea     60 mL    Externally apply 1 dose topically daily    Pain in limb, Plantar fascial fibromatosis       loperamide 2 MG tablet    IMODIUM A-D    30 tablet    Take 1 tablet (2 mg) by mouth 3 times daily as needed Take 2mg (1 tablet) 30 minutes before eating.    Diarrhea, unspecified type       losartan 100 MG tablet    COZAAR    30 tablet    Take 1 tablet by mouth daily.    Hypertension       MAGNESIUM OXIDE PO      Take 400 mg by mouth daily        MAVYRET 100-40 MG Tabs   Generic drug:  Glecaprevir-Pibrentasvir           MINOXIDIL PO      Take 2.5 mg by mouth daily        mupirocin 2 % ointment    BACTROBAN          niacin 500 MG CR tablet    SLO-NIACIN     Take 1 tablet (500 mg) by mouth At Bedtime    Hypertriglyceridemia       niacin 500 MG CR tablet    NIASPAN          omeprazole 40 MG capsule    priLOSEC     Take 1 capsule by mouth as needed        ONDANSETRON PO      Take 4 mg by mouth 3 times daily        OTHER MEDICAL SUPPLIES      CPAP therapy        PENLAC 8 % Soln   Generic drug:  ciclopirox      Apply to adjacent skin and affected nails daily.  Remove with alcohol every 7 days, then repeat.        POLYETHYLENE GLYCOL EX           psyllium 0.52 g capsule     540 capsule    Take 1 capsule (0.52 g) by mouth daily    Diarrhea, unspecified type       QUESTRAN PO      Take by mouth as needed        quiNINE 324 MG  capsule     100 capsule    Take 1 capsule (324 mg) by mouth nightly as needed for moderate pain    Chronic hepatitis C without hepatic coma (H)       senna-docusate 8.6-50 MG per tablet    YASMINE-COLACE    50 tablet    Take 1-2 tablets by mouth 2 times daily as needed for constipation    Peritoneal dialysis status (H)       sildenafil 50 MG tablet    VIAGRA     Take 0.5 tablets by mouth        SYMBICORT IN      Inhale 2 puffs into the lungs 2 times daily        TRAZODONE HCL PO      Take 100 mg by mouth daily        triamcinolone 0.025 % cream    KENALOG    80 g    Please use to affected areas of legs 3-4 days, once weekly to affected areas of the face    Xerosis of skin, Nummular eczema       * UNABLE TO FIND      1 tsp. MEDICATION NAME: calcium        * UNABLE TO FIND      MEDICATION NAME: Centamicin Cream prn        UNABLE TO FIND      MEDICATION NAME: Liquid Calcium 1 teaspoon w/ and between meals        venlafaxine 37.5 MG 24 hr capsule    EFFEXOR-XR          VITAMIN D3 PO      Take 2,000 Units by mouth daily        ZETIA PO      Take 10 mg by mouth        * Notice:  This list has 8 medication(s) that are the same as other medications prescribed for you. Read the directions carefully, and ask your doctor or other care provider to review them with you.

## 2018-08-06 NOTE — PROGRESS NOTES
"Psychosocial Assessment  Patient Name/ Age: Rommel Fan/58 year old   Medical Record #: [id number]  Duration of Interview:   40  min  Process:   Face-to-Face Interview                (counseling < 50%)   Present at Appointment: Rommel \"AR\" and his PCA/SO Leonela        : GEE Pablo, [facility] Date:  August 6, 2018        Type of transplant: Kidney    Donor type:   AR indicated he does not know of any potential donors at this time.   Cadaver   Prior Transplants:    No Status of Transplant:       Current Living Situation    Location:   [address] [place] [address]  With Whom: Leonela       Family/ Social Support:    AR indicated he has one brother ([place], [place]) and two sisters ([place], [place] and [place]).   Available, helpful   Committed relationship:  AR and Leonela have been in a relationship for eight years.   Stable/supportive   Other supports:   Friends Available, helpful       Activities/ Functional Ability    Current level:  AR indicated he has six hours of PCA services a day.   Ambulatory and dependent with ADL's     Transportation Drives self       Vocational/Employment/Financial     Employment   Disabled   Job Description      Income  AR indicated he has been eligible for disability \"for many years\" due to psychological issues.    SSI   Insurance    At this time, patient can afford medication costs:  Yes  MA       Medical Status    Current mode of treatment for ESRD Dialysis   Complications - Non diabetic        Behavioral    Tobacco Use No Chemical Dependency No   AR indicated he quit smoking two years ago. AR indicated he is not currently uses any drugs including marijuana and also does not have any history.  See under notable items.     Psychiatric Impairment No  AR indicated he is on medication for depression which is currently of assistance.  He did participate in counseling in the past.    Reading ability Good  Education level: Some college courses. Recent Legal " History No  See under notable items.        Coping Style/Strategies: AR indicated when under stress he will talk to Leonela or pray.     Ability to Adhere to Complex Medical Regime: Yes     Adherence History:  AR indicated he will usually follow his physician's recommendations.        Education  _X_ Medicare  _X_ Rehabilitation  _X_ Donor issues  _X_ Community resources  _X_ Post discharge housing  _X_ Financial resources  _X_ Medical insurance options  _X_ Psych adjustment  _X_ Family adjustment  _X_ Health Care Directive - Provided a copy and explained the purpose of the Health Care Directive. Psychosocial Risks of Transplant Reviewed and Discussed:  _X_ Increased stress related to emotional,            family, social, employment or financial           situation  _X_ Affect on work and/or disability benefits  _X_ Affect on future life insurance  _X_ Transplant outcome expectations may           not be met  _X_ Mental Health Risks: anxiety,           depression, PTSD, guilt, grief and           chronic fatigue     Notable Items:   AR indicated he did not use any unprescribed medications (marijuana, cocaine are the given examples).  When asked if he ever had used marijuana, cocaine - AR indicated he had not.  There is documentation AR admitted to smokine marijuana to assist with him with coping (2016 conversation with Jimena Minor).  This writer also asked if there have been any issues with the legal system (arrests, prison, probation) AR indicated he never had any issues.  AR does have a record of waller thefts, disorderly conduct, evictions and civil court cases according to the [place] Trial Court Public Access.  2010 was his last waller crime and 2014 last civil court case.  This writer recommends AR be assessed by Dr Sol Armstrong.      Final Evaluation/Assessment   Patient seemed to process information well. Appeared well informed, motivated and able to follow post transplant requirements. Behavior was appropriate  during interview. Has adequate income and insurance coverage. Adequate social support.  At this time patient appears to understand the risks and benefits of transplant.      Recommendation  Conditional   Selection Criteria Met:  Plan for support Yes   Chemical Dependence Yes   Smoking Yes   Mental Health Yes   Adequate Finances Yes    Signature: GEE Pablo, [facility]   Title: Clinical

## 2018-08-06 NOTE — MR AVS SNAPSHOT
After Visit Summary   8/6/2018    Rommel Fan    MRN: [id number]           Patient Information     Date Of Birth          1960        Visit Information        Provider Department      8/6/2018 9:30 AM Carmen Michelle RD [facility] Solid Organ Transplant        Today's Diagnoses     Organ transplant candidate    -  1       Follow-ups after your visit        Your next 10 appointments already scheduled     Aug 06, 2018 11:30 AM CDT   (Arrive by 11:15 AM)   Surgery Consult with  PKE PATIENT 4   [facility] Solid Organ Transplant ([facility])    [address] [place] [address]  [address]  [facility] [id number] [phone]            Aug 06, 2018 12:40 PM CDT   ecg with  CV EKG   [facility] Xray ([facility])    [address] [place] [address]  [address] [facility] [id number] [phone]            Aug 06, 2018  1:00 PM CDT   Lab with  LAB   [facility] Lab ([facility])    [address] [place] [address]  [address] [facility] [id number] [phone]            Aug 06, 2018  1:30 PM CDT   FULL PULMONARY FUNCTION with  PFL B   [facility] Pulmonary Function Testing ([facility])    [address] [place] [address]  [address] [facility] [id number] [phone]            Aug 06, 2018  2:30 PM CDT   Ech Complete with ECHCR1    [facility] ([facility])    [address] [place] [address]  [address] [facility] [id number] [phone]           1.  Please bring or wear a comfortable two-piece outfit. 2.  You may eat, drink and take your normal medicines. 3.  For any questions that cannot be answered, please contact the ordering physician 4.  Please do not wear perfumes or scented lotions on the day of your exam.            Aug 17, 2018  8:00 AM CDT   US AORTA/IVC/ILIAC DUPLEX COMPLETE with UCUSV1   [facility] Imaging Center US ([facility])    [address] [place] [address]  [address] [id number] [phone]           Please bring a list of your medicines (including vitamins, minerals and over-the-counter drugs). Also, tell your doctor about any allergies you may have. Wear comfortable clothes and leave your valuables at home.  Adults: No eating or drinking for 8 hours before the exam. You may take medicine with a small sip of water.  Children: - Infants, breast-fed: may have breast milk up to 2 hours before exam. - Infants, formula: may have bottle until 4 hours before exam. - Children 1-5 years: No food or drink for 4 hours before exam. - Children 6 -12 years: No food or drink for 6 hours before exam. - Children over 12 years: No food or drink for 8 hours before exam. - J Tube Fed: No need to stop feedings.  Please call the Imaging Department at your exam site with any questions.            Aug 17, 2018  9:00 AM CDT   US AORTIC IMAGING with UCUSV1   [facility] Imaging Center US ([facility])    [address] [place] [id number] [phone]           Please bring a list of your medicines (including vitamins, minerals and over-the-counter drugs). Also, tell your doctor about any allergies you may have. Wear comfortable clothes and leave your valuables at home.  No eating or drinking for 8 hours before the exam. You may take medicine with a small sip of water.  Please call the Imaging Department at your exam site with any questions.            Sep 13, 2018  3:15 PM CDT   (Arrive by 3:00 PM)   Return Visit with Jaxon Watson MD   [facility] Dermatology ([facility])    [address] [place] [id number]   [phone]            Oct 26, 2018 11:20 AM CDT   (Arrive by 11:05 AM)   Return Visit with Wes Guerra   [facility] Gastroenterology and IBD Clinic ([facility])    [address] [place] [id number] [phone]             Nov 15, 2018  5:00 PM CST   (Arrive by 4:45 PM)   Return Visit with Cody Lloyd MD   [facility] ([facility])    [address] [facility]  [address] [place] [address] [phone]              Who to contact     If you have questions or need follow up information about today's clinic visit or your schedule please contact [facility] SOLID ORGAN TRANSPLANT directly at [phone].  Normal or non-critical lab and imaging results will be communicated to you by Grabbedhart, letter or phone within 4 business days after the clinic has received the results. If you do not hear from us within 7 days, please contact the clinic through Yagantect or phone. If you have a critical or abnormal lab result, we will notify you by phone as soon as possible.  Submit refill requests through Euroffice or call your pharmacy and they will forward the refill request to us. Please allow 3 business days for your refill to be completed.          Additional Information About Your Visit        Euroffice Information     Euroffice gives you secure access to your electronic health record. If you see a primary care provider, you can also send messages to your care team and make appointments. If you have questions, please call your primary care clinic.  If you do not have a primary care provider, please call [phone] and they will assist you.        Care EveryWhere ID     This is your Care EveryWhere ID. This could be used by other organizations to access your [facility] medical records  [id number]         Blood Pressure from Last 3 Encounters:   07/06/18 145/88   03/27/18 (!) 145/98   12/29/17 138/81    Weight from Last 3 Encounters:   07/06/18 100.5 kg (221 lb 8 oz)   07/10/18 97.5 kg (215 lb)   03/27/18 95.9 kg (211 lb 6.4 oz)              Today, you had the following     No orders found for display       Primary Care Provider Office Phone # Fax #    Chris Barraza [phone]        [facility] [address]  [place] [facility] [address]        Equal Access to Services     TOANMERVIN CHAD : Hadii diane sun hadrustam Martinez, walinnda luqadaha, qaybta kafilida isaacjohn, elpidio stewart carlotawalter graygrace huff live mendez. So [facility] [phone].    ATENCIÓN: Si habla español, tiene a das disposición servicios gratuitos de asistencia lingüística. Llame al [phone].    We comply with applicable federal civil rights laws and [place] laws. We do not discriminate on the basis of race, color, national origin, age, disability, sex, sexual orientation, or gender identity.            Thank you!     Thank you for choosing [facility] ORGAN TRANSPLANT  for your care. Our goal is always to provide you with excellent care. Hearing back from our patients is one way we can continue to improve our services. Please take a few minutes to complete the written survey that you may receive in the mail after your visit with us. Thank you!             Your Updated Medication List - Protect others around you: Learn how to safely use, store and throw away your medicines at www.disposemymeds.org.          This list is accurate as of 8/6/18 11:11 AM.  Always use your most recent med list.                   Brand Name Dispense Instructions for use Diagnosis    * albuterol 108 (90 Base) MCG/ACT Inhaler    PROAIR HFA/PROVENTIL HFA/VENTOLIN HFA     Inhale 2 puffs into the lungs every 6 hours as needed        * albuterol (2.5 MG/3ML) 0.083% neb solution           ALLOPURINOL PO      Take 200 mg by mouth daily        alpha-d-galactosidase tablet      Take 1 tablet by mouth as needed for intestinal gas        amLODIPine 10 MG tablet    NORVASC     Take 1 tablet by mouth daily.        aspirin 81 MG EC tablet     90 tablet    Take 1 tablet (81 mg) by mouth daily . Please be seen in clinic with labs for further refills.    Bilateral claudication of lower limb (H), Hypertension secondary to other renal disorders       WAYLON CONTOUR  test strip   Generic drug:  blood glucose monitoring           blood glucose monitoring lancets           * CALCIUM ANTACID PO      Take 1,000 mg by mouth        * CALCIUM ANTACID ULTRA 1000 MG Chew   Generic drug:  calcium carbonate antacid           carvedilol 12.5 MG tablet    COREG    180 tablet    Take 25 mg by mouth daily    Hypertension secondary to other renal disorders, Cough, Shortness of breath       CHANTIX PO      Take 1 mg by mouth 2 times daily as needed        clonazePAM 0.5 MG tablet    klonoPIN          COLCRYS PO      Take 0.6 mg by mouth 2 times daily        cyclobenzaprine 10 MG tablet    FLEXERIL          DIALYVITE PO      Take by mouth daily        dianeal PD-2.5% dex (calcium 3.5 mEq/L) 396 MOSM/L Soln CAPD     1000 mL    1,000 mLs by Dialysis route once for 1 dose    PD catheter dysfunction, initial encounter (H), ESRD (end stage renal disease) (H)       fish oil-omega-3 fatty acids 1000 MG capsule      Take 2 g by mouth daily        FUROSEMIDE PO      Take 80 mg by mouth daily        GABAPENTIN PO      Take 100 mg by mouth At Bedtime        gentamicin 0.1 % cream    GARAMYCIN          hydrALAZINE 100 MG Tabs tablet    APRESOLINE    90 tablet    Take 1 tablet by mouth 3 times daily.    Hypertension       isosorbide mononitrate 60 MG 24 hr tablet    IMDUR    180 tablet    Take 1 tablet (60 mg) by mouth 2 times daily DO NOT TAKE WITH SILDENAFIL (viagra)    HTN (hypertension)       * ketoconazole 2 % shampoo    NIZORAL    120 mL    Please use every other day to scalp and affected areas of face    Dermatitis, seborrheic       * ketoconazole 2 % cream    NIZORAL    60 g    Please use twice daily for 3 months    Dermatitis, seborrheic       lidocaine 5 % ointment    XYLOCAINE          Lidocaine HCl 2 % Crea     60 mL    Externally apply 1 dose topically daily    Pain in limb, Plantar fascial fibromatosis       loperamide 2 MG tablet    IMODIUM A-D    30 tablet    Take 1 tablet (2 mg) by mouth 3  times daily as needed Take 2mg (1 tablet) 30 minutes before eating.    Diarrhea, unspecified type       losartan 100 MG tablet    COZAAR    30 tablet    Take 1 tablet by mouth daily.    Hypertension       MAGNESIUM OXIDE PO      Take 400 mg by mouth daily        MAVYRET 100-40 MG Tabs   Generic drug:  Glecaprevir-Pibrentasvir           MINOXIDIL PO      Take 2.5 mg by mouth daily        mupirocin 2 % ointment    BACTROBAN          niacin 500 MG CR tablet    SLO-NIACIN     Take 1 tablet (500 mg) by mouth At Bedtime    Hypertriglyceridemia       niacin 500 MG CR tablet    NIASPAN          omeprazole 40 MG capsule    priLOSEC     Take 1 capsule by mouth as needed        ONDANSETRON PO      Take 4 mg by mouth 3 times daily        OTHER MEDICAL SUPPLIES      CPAP therapy        PENLAC 8 % Soln   Generic drug:  ciclopirox      Apply to adjacent skin and affected nails daily.  Remove with alcohol every 7 days, then repeat.        POLYETHYLENE GLYCOL EX           psyllium 0.52 g capsule     540 capsule    Take 1 capsule (0.52 g) by mouth daily    Diarrhea, unspecified type       QUESTRAN PO      Take by mouth as needed        quiNINE 324 MG capsule     100 capsule    Take 1 capsule (324 mg) by mouth nightly as needed for moderate pain    Chronic hepatitis C without hepatic coma (H)       senna-docusate 8.6-50 MG per tablet    YASMINE-COLACE    50 tablet    Take 1-2 tablets by mouth 2 times daily as needed for constipation    Peritoneal dialysis status (H)       sildenafil 50 MG tablet    VIAGRA     Take 0.5 tablets by mouth        SYMBICORT IN      Inhale 2 puffs into the lungs 2 times daily        TRAZODONE HCL PO      Take 100 mg by mouth daily        triamcinolone 0.025 % cream    KENALOG    80 g    Please use to affected areas of legs 3-4 days, once weekly to affected areas of the face    Xerosis of skin, Nummular eczema       * UNABLE TO FIND      1 tsp. MEDICATION NAME: calcium        * UNABLE TO FIND      MEDICATION  NAME: Centamicin Cream prn        UNABLE TO FIND      MEDICATION NAME: Liquid Calcium 1 teaspoon w/ and between meals        venlafaxine 37.5 MG 24 hr capsule    EFFEXOR-XR          VITAMIN D3 PO      Take 2,000 Units by mouth daily        ZETIA PO      Take 10 mg by mouth        * Notice:  This list has 8 medication(s) that are the same as other medications prescribed for you. Read the directions carefully, and ask your doctor or other care provider to review them with you.

## 2018-08-06 NOTE — PROGRESS NOTES
"Chief Complaint   Patient presents with     Transplant Evaluation     Kidney Eval      /66  Pulse 77  Temp 98.4  F (36.9  C) (Oral)  Ht 1.727 m (5' 8\")  Wt 101.8 kg (224 lb 6.4 oz)  SpO2 98%  BMI 34.12 kg/m2  Katie Field, [facility]    "

## 2018-08-06 NOTE — PROGRESS NOTES
Transplant Surgery Consult Note     Medical record number: [id number]  YOB: 1960,   Consult requested by Dr. Castorena for evaluation of kidney transplant candidacy.    Assessment and Recommendations:Mr. Fan appears to be a good candidate for kidney transplantation and has a fair understanding of the risks and benefits of this approach to the management of renal failure. The following issues should be addressed prior to finalizing his transplant candidacy:     Weight loss, CT of abdomen and pelvis non contrast.     The majority of our visit was spent in counselling, discussing the medical and surgical risks of kidney transplantation. We discussed approximate wait time and how that is influenced by issues such as blood type and sensitization (PRA) and access to a living donor. I contrasted potential waiting time for living vs  donor kidneys from  normal (0-85%) or higher (%) kidney donor profile index (KDPI) donors and their associated outcomes. I would recommend this individual to consider kidneys from high KDPI donors. The reason for this decision is best summarized as: decreased dialysis related morbidity/mortality, accepting lower kidney graft survival rates. Potential surgical complications of kidney transplantation include bleeding, superficial or deep wound complications (infection, hernia, lymphocele), ureteral anastomotic failure (leak or stenosis), graft thrombosis, need for reoperation and other issues such as cardiac complications, pneumonia, deep venous thrombosis, pulmonary embolism, post transplant diabetes and death. The potential for recurrent disease or need for retransplantation was also addressed. We discussed the possible need for ureteral stent (and subsequent removal), and the utility of protocol biopsy and laboratory studies to evaluate for rejection or recurrent disease. We discussed the risk of graft rejection, our center's average graft and patient survival rates,  immunosuppression protocols, as well as the potential opportunity to participate in clinical trials.  We also discussed the average length of stay, recovery process, and posttransplant lab and monitoring protocol.  I emphasized the need for strict immunosuppression medication adherence and the potential for complications of immunosuppression such as skin cancer or lymphoma, as well as a very low but not zero risk of donor-derived disease transmission risks (infection, cancer). Mr. Fan asked good questions and his candidacy will be reviewed at our Multidisciplinary Selection Committee. Thank you for the opportunity to participate in Mr. Fan's care.      Total time: 45 minutes  Counselling time: 40 minutes      Maria E Gomez CNP       ---------------------------------------------------------------------------------------------------    HPI: Mr. Fan has End stage renal failure due to Hypertension. The patient is non-diabetic.       The patient is on dialysis.    Has potential kidney donors:  No.  Interested in participation in paired exchange if a donor is willing: No    The patient has the following pertinent history:       No    Yes  Dialysis:    []      [x] via:    PD   Blood Transfusion                  [x]      []  Number of units:   Most recently:  Pregnancy:    [x]      [] Number:       Previous Transplant:  [x]      [] Details:    Cancer    [x]      [] Comment:   Kidney stones   [x]      [] Comment:      Recurrent infections  [x]      []  Type:                  Bladder dysfunction  []      [x] Cause: Minimal urine production.  Around a cup daily.    Claudication   []      [x] Distance: few blocks   Previous Amputation  [x]      [] Cause:     Chronic anticoagulation  [x]      [] Indication:   Adventism  [x]      []      Past Medical History:   Diagnosis Date     Asthma 1979     COPD (chronic obstructive pulmonary disease) (H) 1988     Depressive disorder     currently on Effexor     End  stage renal failure on dialysis (H)     Started dialysis 4/29/16     Gastrointestinal problem      Hepatitis C 2018    Treated this 2018     Hyperlipidemia LDL goal <100 4/3/2013     Hypertension      Hypertriglyceridemia 7/13/2015     Obstructive sleep apnea      Sinus problem      Past Surgical History:   Procedure Laterality Date     BIOPSY      [facility]     CHOLECYSTECTOMY  2013     COLONOSCOPY  2016    [place]     LAPAROSCOPIC INSERTION CATHETER PERITONEAL DIALYSIS N/A 3/16/2016    Procedure: LAPAROSCOPIC INSERTION CATHETER PERITONEAL DIALYSIS;  Surgeon: Winnie Colmenares MD;  Location: [facility] OR     SEPTOPLASTY, TURBINOPLASTY, COMBINED  Nov 2008    inferior turbinate reduction and septoplasty due to deviation to right     TURBINOPLASTY  July 2008    bilateral inferior turbinate reduction     TURBINOPLASTY  1/27/2014    Procedure: TURBINOPLASTY;  Bilateral Turbinate Reduction ;  Surgeon: Cindy Carrasquillo MD;  Location: [facility] OR     Family History   Problem Relation Age of Onset     Asthma Mother      Diabetes Mother      Hypertension Mother      Cerebrovascular Disease Mother      Cancer Mother      Alcohol/Drug Mother      HEART DISEASE Mother      Asthma Sister      Asthma Sister      Asthma Sister      Asthma Brother      Asthma Brother      Asthma Brother      HEART DISEASE Sister      Diabetes Sister      Social History     Social History     Marital status: Single     Spouse name: Leonela     Number of children: 1     Years of education: N/A     Occupational History      Self Employed.     Social History Main Topics     Smoking status: Former Smoker     Packs/day: 0.50     Years: 20.00     Types: Cigarettes     Start date: 1/1/1975     Quit date: 1/1/2016     Smokeless tobacco: Never Used     Alcohol use Yes      Comment: 2 drinks/week     Drug use: No     Sexual activity: Not on file     Other Topics Concern     Not on file     Social History Narrative    [facility]  with his wife, shanika  son       ROS:   CONSTITUTIONAL:  No fevers or chills  EYES: negative for icterus  ENT:  negative for hearing loss, tinnitus and sore throat  RESPIRATORY:  negative for cough, sputum, dyspnea  CARDIOVASCULAR:  negative for chest pain positive for fatigue  GASTROINTESTINAL:  negative for constipation.  Positive for diarrhea, nausea or vomiting.   GENITOURINARY:  Positive for incontinence.   HEME:  No easy bruising  INTEGUMENT:  negative for rash. Positive for pruritis    NEURO:  Negative for headache, seizure disorder  Allergies:   No Known Allergies  Medications:  Prescription Medications as of 8/6/2018             albuterol (2.5 MG/3ML) 0.083% neb solution     albuterol (PROAIR HFA, PROVENTIL HFA, VENTOLIN HFA) 108 (90 BASE) MCG/ACT inhaler Inhale 2 puffs into the lungs every 6 hours as needed     ALLOPURINOL PO Take 200 mg by mouth daily     alpha-d-galactosidase (BEANO) tablet Take 1 tablet by mouth as needed for intestinal gas    amLODIPine (NORVASC) 10 MG tablet Take 1 tablet by mouth daily.    aspirin 81 MG EC tablet Take 1 tablet (81 mg) by mouth daily . Please be seen in clinic with labs for further refills.    B Complex-C-Folic Acid (DIALYVITE PO) Take by mouth daily    WAYLON CONTOUR test strip     blood glucose monitoring (PRODIGY TWIST TOP 28G) lancets     Budesonide-Formoterol Fumarate (SYMBICORT IN) Inhale 2 puffs into the lungs 2 times daily     CALCIUM ANTACID ULTRA 1000 MG CHEW     Calcium Carbonate Antacid (CALCIUM ANTACID PO) Take 1,000 mg by mouth    carvedilol (COREG) 12.5 MG tablet Take 25 mg by mouth daily     Cholecalciferol (VITAMIN D3 PO) Take 2,000 Units by mouth daily     Cholestyramine (QUESTRAN PO) Take by mouth as needed     ciclopirox (PENLAC) 8 % SOLN Apply to adjacent skin and affected nails daily.  Remove with alcohol every 7 days, then repeat.    clonazePAM (KLONOPIN) 0.5 MG tablet     Colchicine (COLCRYS PO) Take 0.6 mg by mouth 2 times daily    cyclobenzaprine (FLEXERIL) 10 MG  tablet     Ezetimibe (ZETIA PO) Take 10 mg by mouth    fish oil-omega-3 fatty acids 1000 MG capsule Take 2 g by mouth daily    FUROSEMIDE PO Take 80 mg by mouth daily    GABAPENTIN PO Take 100 mg by mouth At Bedtime    gentamicin (GARAMYCIN) 0.1 % cream     hydrALAZINE (APRESOLINE) 100 MG TABS Take 1 tablet by mouth 3 times daily.    isosorbide mononitrate (IMDUR) 60 MG 24 hr tablet Take 1 tablet (60 mg) by mouth 2 times daily DO NOT TAKE WITH SILDENAFIL (viagra)    ketoconazole (NIZORAL) 2 % cream Please use twice daily for 3 months    ketoconazole (NIZORAL) 2 % shampoo Please use every other day to scalp and affected areas of face    lidocaine (XYLOCAINE) 5 % ointment     Lidocaine HCl 2 % CREA Externally apply 1 dose topically daily    loperamide (IMODIUM A-D) 2 MG tablet Take 1 tablet (2 mg) by mouth 3 times daily as needed Take 2mg (1 tablet) 30 minutes before eating.    losartan (COZAAR) 100 MG tablet Take 1 tablet by mouth daily.    MAGNESIUM OXIDE PO Take 400 mg by mouth daily    MAVYRET 100-40 MG TABS     MINOXIDIL PO Take 2.5 mg by mouth daily    mupirocin (BACTROBAN) 2 % ointment     niacin (NIASPAN) 500 MG CR tablet     niacin (SLO-NIACIN) 500 MG CR tablet Take 1 tablet (500 mg) by mouth At Bedtime    omeprazole (PRILOSEC) 40 MG capsule Take 1 capsule by mouth as needed     ONDANSETRON PO Take 4 mg by mouth 3 times daily    OTHER MEDICAL SUPPLIES CPAP therapy    Peritoneal [employer] (DIANEAL PD-2.5%, CALCIUM 3.5 MEQ/L,) 396 MOSM/L SOLN 1,000 mLs by Dialysis route once for 1 dose    POLYETHYLENE GLYCOL EX     psyllium 0.52 G capsule Take 1 capsule (0.52 g) by mouth daily    quiNINE 324 MG capsule Take 1 capsule (324 mg) by mouth nightly as needed for moderate pain    senna-docusate (YASMINE-COLACE) 8.6-50 MG per tablet Take 1-2 tablets by mouth 2 times daily as needed for constipation    sildenafil (VIAGRA) 50 MG tablet Take 0.5 tablets by mouth    TRAZODONE HCL PO Take 100 mg by mouth daily     triamcinolone (KENALOG) 0.025 % cream Please use to affected areas of legs 3-4 days, once weekly to affected areas of the face    UNABLE TO FIND MEDICATION NAME: Liquid Calcium 1 teaspoon w/ and between meals    UNABLE TO FIND 1 tsp. MEDICATION NAME: calcium    UNABLE TO FIND MEDICATION NAME: Centamicin Cream prn    Varenicline Tartrate (CHANTIX PO) Take 1 mg by mouth 2 times daily as needed     venlafaxine (EFFEXOR-XR) 37.5 MG 24 hr capsule       Facility Administered Medications as of 8/6/2018             hydrALAZINE (APRESOLINE) injection as needed for high blood pressure        Exam:   Temp:  [98.4  F (36.9  C)] 98.4  F (36.9  C)  Pulse:  [77] 77  BP: (115)/(66) 115/66  SpO2:  [98 %] 98 %  Appearance: in no apparent distress.   Skin: normal  Head and Neck: Normal, no rashes or jaundice  Respiratory: easy respirations, no audible wheezing.  Cardiovascular: RRR  Abdomen: obese, Surgical scars consistent with history   Extremeties: femoral 3+/3+, Edema, trace  Neuro: without deficit     Diagnostics:   No results found for this or any previous visit (from the past 672 hour(s)).  No results found for: CPRA     Attestation:  Seen with Ms Gomez  agree  Very pleasant man with ESRD (HTN) on PD.    1.Pulses down a bit (may be from body habitus).  Check noncontrast CT to look at iliacs.  Makes about 200 ml urine/day, no cells.  Other than arterial assessment and weight should be ok from surgical perspective.  2. Obesity: should lose some weight.  See dietary  3. Hx of smoking, htn and esrd: high risk for cardiac disease.  Formal ischemic heart workup.  4.Recenty treated for HCV with apparent EVR, still waiting for time to assess SVR.  Do not see assessment for fibrosis. Should prob have a fibroscan.

## 2018-08-06 NOTE — MR AVS SNAPSHOT
After Visit Summary   8/6/2018    Rommel Fan    MRN: [id number]           Patient Information     Date Of Birth          1960        Visit Information        Provider Department      8/6/2018 9:45 AM Margarita Santamaria MSW [facility] Solid Organ Transplant        Today's Diagnoses     Awaiting organ transplant status    -  1       Follow-ups after your visit        Your next 10 appointments already scheduled     Aug 17, 2018  8:00 AM CDT   US AORTA/IVC/ILIAC DUPLEX COMPLETE with [address] [facility] ([facility])    [address] [place] [id number] [phone]           Please bring a list of your medicines (including vitamins, minerals and over-the-counter drugs). Also, tell your doctor about any allergies you may have. Wear comfortable clothes and leave your valuables at home.  Adults: No eating or drinking for 8 hours before the exam. You may take medicine with a small sip of water.  Children: - Infants, breast-fed: may have breast milk up to 2 hours before exam. - Infants, formula: may have bottle until 4 hours before exam. - Children 1-5 years: No food or drink for 4 hours before exam. - Children 6 -12 years: No food or drink for 6 hours before exam. - Children over 12 years: No food or drink for 8 hours before exam. - J Tube Fed: No need to stop feedings.  Please call the Imaging Department at your exam site with any questions.            Aug 17, 2018  9:00 AM CDT   US AORTIC IMAGING with [address] [facility] ([facility])    [address] [place] [id number] [phone]           Please bring a list of your medicines (including vitamins, minerals and over-the-counter drugs). Also, tell your doctor about any allergies you may have. Wear comfortable clothes and leave your valuables at home.  No eating or drinking for 8 hours before the exam. You may  take medicine with a small sip of water.  Please call the Imaging Department at your exam site with any questions.            Sep 13, 2018  3:15 PM CDT   (Arrive by 3:00 PM)   Return Visit with Jaxon Watson MD   [facility] ([facility] [place])    [address] [place] [address]  3rd Floor  [facility] [address]   [phone]            Oct 26, 2018 11:20 AM CDT   (Arrive by 11:05 AM)   Return Visit with Wes Guerra   [facility] ([facility])    [address] [place] [address]  4th Floor  [facility] [id number] [phone]            Nov 15, 2018  5:00 PM CST   (Arrive by 4:45 PM)   Return Visit with Cody Lloyd MD   [facility] ([facility])    [address] [place] [address]  [address]  [facility] [id number] [phone]              Who to contact     If you have questions or need follow up information about today's clinic visit or your schedule please contact [facility]ANSPL[facility] directly at [phone].  Normal or non-critical lab and imaging results will be communicated to you by MyChart, letter or phone within 4 business days after the clinic has received the results. If you do not hear from us within 7 days, please contact the clinic through [employer] or phone. If you have a critical or abnormal lab result, we will notify you by phone as soon as possible.  Submit refill requests through [employer] or call your pharmacy and they will forward the refill request to us. Please allow 3 business days for your refill to be completed.          Additional Information About Your Visit        MyChart Information     [employer] gives you secure access to your electronic health record. If you see a primary care provider, you can also send messages to your care team and make appointments. If you have questions, please call your primary care clinic.  If you do not have a primary care provider, please call  [phone] and they will assist you.        Care EveryWhere ID     This is your Care EveryWhere ID. This could be used by other organizations to access your [facility] medical records  [id number]         Blood Pressure from Last 3 Encounters:   08/06/18 115/66   07/06/18 145/88   03/27/18 (!) 145/98    Weight from Last 3 Encounters:   08/06/18 101.8 kg (224 lb 6.4 oz)   07/06/18 100.5 kg (221 lb 8 oz)   07/10/18 97.5 kg (215 lb)              Today, you had the following     No orders found for display       Primary Care Provider Office Phone # Fax #    Chris Barraza [phone]       [facility] [address]  [place] [facility] [address]        Equal Access to Services     MERVIN BONILLA : Hadii aad dino hadasho Soomaali, waaxda luqadaha, qaybta kaalmada adeegyada, elpidio mancini . So [facility] [phone].    ATENCIÓN: Si habla español, tiene a das disposición servicios gratuitos de asistencia lingüística. Garcia al [phone].    We comply with applicable federal civil rights laws and [place] laws. We do not discriminate on the basis of race, color, national origin, age, disability, sex, sexual orientation, or gender identity.            Thank you!     Thank you for choosing [facility] TRANSPL[facility]  for your care. Our goal is always to provide you with excellent care. Hearing back from our patients is one way we can continue to improve our services. Please take a few minutes to complete the written survey that you may receive in the mail after your visit with us. Thank you!             Your Updated Medication List - Protect others around you: Learn how to safely use, store and throw away your medicines at www.disposemymeds.org.          This list is accurate as of 8/6/18 11:59 PM.  Always use your most recent med list.                   Brand Name Dispense Instructions for use Diagnosis    * albuterol 108 (90 Base) MCG/ACT Inhaler    PROAIR HFA/PROVENTIL  HFA/VENTOLIN HFA     Inhale 2 puffs into the lungs every 6 hours as needed        * albuterol (2.5 MG/3ML) 0.083% neb solution           ALLOPURINOL PO      Take 200 mg by mouth daily        alpha-d-galactosidase tablet      Take 1 tablet by mouth as needed for intestinal gas        amLODIPine 10 MG tablet    NORVASC     Take 1 tablet by mouth daily.        aspirin 81 MG EC tablet     90 tablet    Take 1 tablet (81 mg) by mouth daily . Please be seen in clinic with labs for further refills.    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 renal disorders, Cough, Shortness of breath       CHANTIX PO      Take 1 mg by mouth 2 times daily as needed        clonazePAM 0.5 MG tablet    klonoPIN          COLCRYS PO      Take 0.6 mg by mouth 2 times daily        cyclobenzaprine 10 MG tablet    FLEXERIL          DIALYVITE PO      Take by mouth daily        dianeal PD-2.5% dex (calcium 3.5 mEq/L) 396 MOSM/L Soln CAPD     1000 mL    1,000 mLs by Dialysis route once for 1 dose    PD catheter dysfunction, initial encounter (H), ESRD (end stage renal disease) (H)       fish oil-omega-3 fatty acids 1000 MG capsule      Take 2 g by mouth daily        FUROSEMIDE PO      Take 80 mg by mouth daily        GABAPENTIN PO      Take 100 mg by mouth At Bedtime        gentamicin 0.1 % cream    GARAMYCIN          hydrALAZINE 100 MG Tabs tablet    APRESOLINE    90 tablet    Take 1 tablet by mouth 3 times daily.    Hypertension       isosorbide mononitrate 60 MG 24 hr tablet    IMDUR    180 tablet    Take 1 tablet (60 mg)  by mouth 2 times daily DO NOT TAKE WITH SILDENAFIL (viagra)    HTN (hypertension)       * ketoconazole 2 % shampoo    NIZORAL    120 mL    Please use every other day to scalp and affected areas of face    Dermatitis, seborrheic       * ketoconazole 2 % cream    NIZORAL    60 g    Please use twice daily for 3 months    Dermatitis, seborrheic       lidocaine 5 % ointment    XYLOCAINE          Lidocaine HCl 2 % Crea     60 mL    Externally apply 1 dose topically daily    Pain in limb, Plantar fascial fibromatosis       loperamide 2 MG tablet    IMODIUM A-D    30 tablet    Take 1 tablet (2 mg) by mouth 3 times daily as needed Take 2mg (1 tablet) 30 minutes before eating.    Diarrhea, unspecified type       losartan 100 MG tablet    COZAAR    30 tablet    Take 1 tablet by mouth daily.    Hypertension       MAGNESIUM OXIDE PO      Take 400 mg by mouth daily        MAVYRET 100-40 MG Tabs   Generic drug:  Glecaprevir-Pibrentasvir           MINOXIDIL PO      Take 2.5 mg by mouth daily        mupirocin 2 % ointment    BACTROBAN          niacin 500 MG CR tablet    SLO-NIACIN     Take 1 tablet (500 mg) by mouth At Bedtime    Hypertriglyceridemia       niacin 500 MG CR tablet    NIASPAN          omeprazole 40 MG capsule    priLOSEC     Take 1 capsule by mouth as needed        ONDANSETRON PO      Take 4 mg by mouth 3 times daily        OTHER MEDICAL SUPPLIES      CPAP therapy        PENLAC 8 % Soln   Generic drug:  ciclopirox      Apply to adjacent skin and affected nails daily.  Remove with alcohol every 7 days, then repeat.        POLYETHYLENE GLYCOL EX           psyllium 0.52 g capsule     540 capsule    Take 1 capsule (0.52 g) by mouth daily    Diarrhea, unspecified type       QUESTRAN PO      Take by mouth as needed        quiNINE 324 MG capsule     100 capsule    Take 1 capsule (324 mg) by mouth nightly as needed for moderate pain    Chronic hepatitis C without hepatic coma (H)       senna-docusate 8.6-50 MG per tablet     YASMINE-COLACE    50 tablet    Take 1-2 tablets by mouth 2 times daily as needed for constipation    Peritoneal dialysis status (H)       sildenafil 50 MG tablet    VIAGRA     Take 0.5 tablets by mouth        SYMBICORT IN      Inhale 2 puffs into the lungs 2 times daily        TRAZODONE HCL PO      Take 100 mg by mouth daily        triamcinolone 0.025 % cream    KENALOG    80 g    Please use to affected areas of legs 3-4 days, once weekly to affected areas of the face    Xerosis of skin, Nummular eczema       * UNABLE TO FIND      1 tsp. MEDICATION NAME: calcium        * UNABLE TO FIND      MEDICATION NAME: Centamicin Cream prn        UNABLE TO FIND      MEDICATION NAME: Liquid Calcium 1 teaspoon w/ and between meals        venlafaxine 37.5 MG 24 hr capsule    EFFEXOR-XR          VITAMIN D3 PO      Take 2,000 Units by mouth daily        ZETIA PO      Take 10 mg by mouth        * Notice:  This list has 8 medication(s) that are the same as other medications prescribed for you. Read the directions carefully, and ask your doctor or other care provider to review them with you.

## 2018-08-06 NOTE — PROGRESS NOTES
Assessment and Plan:  1. Kidney transplant evaluation - patient is a good candidate overall. Benefits of a living donor transplant were discussed.  -- needs pre screening workup repeated colonoscopy, low dose CT chest lung cancer screening, updated PSA.,   -- needs cardiology evaluation ischemia workup Echo, stress test, EKG.      2. ESKD from Hypertension on PD dialysis since 2016, no hx of cathter related issue, no hx of peritonitis, recently his dialysis is inadequate and now with uremic symptoms. Likely related to constipation.  -- will follow up with his nephrology for management.     3. Hep C: seen by GI     4. Colon polyp: seen in colonoscopy 2016 tubular adenoma low grade dysplasia and inflammatory polyp.    -- repeated colonoscopy before 2019.     5. Hx of smoking: more than 30 years, quit 2016  -- need low grade CT scan.    6. COPD/sleep apnea: on home medications inhaler, use C-pap daily, need pulmonary evaluation and updated PFT.    7. Depression: on Effexor, no suicide thought.      Discussed the risks and benefits of a transplant, including the risk of surgery and immunosuppression medications.  Patients overall evaluation will be discussed in the Transplant Program's regular meeting with a final recommendation on the patients suitability for transplant to be made at that time.    Consult:  Rommel Fan was seen in consultation at the request of Dr. Winnie Colmenares for evaluation as a potential Kidney transplant recipient.    Reason for Visit:  Rommel Fan is a 58 year old male with ESKD from Hypertension, who presents for Kidney transplant evaluation.    HPI:  Patient 58 year old  male with past medical history of ESRD due to hypertensive nephropathy biopsy-proven 2010, started on peritoneal dialysis in 2016, his primary nephrologist is Dr. Dayana Castorena, patient also history of hypertension hyperlipidemia, sleep apnea, hep C, COPD, asthma, he is interested in kidney  transplant and is being referred by his nephrologist,  Hx of hep C and followed up with Dr Shon sheppard now, hx of colon polyp seen in colonoscopy 2016 tubular adenoma and inflammatory polyp need follow up scheduled for repeated colonoscopy soon, no hx of cancer but more than 30 years smoker quit 2016, his recent PFT shows COPD changes moderate air way obstruction now on inhaler, no acute exacerbation, he has not been evaluated or screen for lung cancer, CXR was unremarkable, he dose not have any limitation to his physical activity and can walk more than 2 block with no chest pain or sob, no hx of cardiac disease but never been evaluated before, he was seen by dermatology last month no cancer lesion, no hx of prostate cancer and his PSA  Last checked was normal he need updated one. Recently had an issue with his PD dialysis is inadequate he was having diarrhea and then started on antidiarrhea medication which make him constipated. He feels fatigue tired nauseated with poor appetite and metallic taste in his mouth. He denies any chest pain or sob no fever no cough, no other complaint.          Kidney Disease Hx:        Kidney Disease Dx: ESRD on PD        Biopsy Proven: Yes; 2010 hypertensive nephropathy          On Dialysis: Yes, Date initiated: 2016, Dialysis Type: PD; and Dialysis unit: [facility]       Primary Nephrologist: Dayana Guzman Hx:       h/o HTN: Yes  Usual BP: controlled        h/o DM:  No       h/o Protein in Urine: Yes        h/o Blood in Urine:  No       h/o Kidney Stones:  No       h/o UTI: No       h/o Chronic NSAID Use: No         Previous Transplant Hx:       No         Transplant Sensitization Hx:       Previous Tx: No       Blood Transfusion: No       Pregnancy: Not applicable         Uremic Symptoms:       Fatigue: Yes; Cold: Yes; Nausea: Yes; Poor Appetite: Yes; Metallic Taste: Yes; Edema: No; Pruritis: No; Tremor: No;         Cardiovascular Hx:       h/o Cardiac  Issues: No       Exercise Tolerance: no chest pain or shortness of breath with exertion.         Health Maintenance:       Colonoscopy: Not up to date, Dermatology: Up to date and Dental: Not up to date         Potential Donor(s): No    ROS:  A comprehensive review of systems was obtained and negative, except as noted in the HPI or PMH.    PMH:   Medical records were reviewed  and PMH was discussed with patient and noted below.  Past Medical History:   Diagnosis Date     Asthma 1979     COPD (chronic obstructive pulmonary disease) (H) 1988     Depressive disorder     currently on Effexor     End stage renal failure on dialysis (H)     Started dialysis 4/29/16     Gastrointestinal problem      Hepatitis C 2018    Treated this 2018     Hyperlipidemia LDL goal <100 4/3/2013     Hypertension      Hypertriglyceridemia 7/13/2015     Obstructive sleep apnea      Sinus problem        PSH:   Past Surgical History:   Procedure Laterality Date     BIOPSY      Kidney @ [facility]     CHOLECYSTECTOMY  2013     COLONOSCOPY  2016    [facility]     LAPAROSCOPIC INSERTION CATHETER PERITONEAL DIALYSIS N/A 3/16/2016    Procedure: LAPAROSCOPIC INSERTION CATHETER PERITONEAL DIALYSIS;  Surgeon: Winnie Colmenares MD;  Location: [facility] OR     SEPTOPLASTY, TURBINOPLASTY, COMBINED  Nov 2008    inferior turbinate reduction and septoplasty due to deviation to right     TURBINOPLASTY  July 2008    bilateral inferior turbinate reduction     TURBINOPLASTY  1/27/2014    Procedure: TURBINOPLASTY;  Bilateral Turbinate Reduction ;  Surgeon: Cindy Carrasquillo MD;  Location: [facility] OR     Personal or family history of bleeding or anesthesia problems: No    Family Hx:  Family History   Problem Relation Age of Onset     Asthma Mother      Diabetes Mother      Hypertension Mother      Cerebrovascular Disease Mother      Cancer Mother      Alcohol/Drug Mother      HEART DISEASE Mother      Asthma Sister      Asthma Sister      Asthma Sister      Asthma Brother      Asthma  Brother      Asthma Brother      HEART DISEASE Sister      Diabetes Sister        Personal Hx:   Social History     Social History     Marital status: Single     Spouse name: Leonela     Number of children: 1     Years of education: N/A     Occupational History      Self Employed.     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 Authorizing Provider   albuterol (2.5 MG/3ML) 0.083% neb solution  7/3/18   Reported, Patient   albuterol (PROAIR HFA, PROVENTIL HFA, VENTOLIN HFA) 108 (90 BASE) MCG/ACT inhaler Inhale 2 puffs into the lungs every 6 hours as needed     Reported, Patient   ALLOPURINOL PO Take 200 mg by mouth daily     Reported, Patient   alpha-d-galactosidase (BEANO) tablet Take 1 tablet by mouth as needed for intestinal gas    Reported, Patient   amLODIPine (NORVASC) 10 MG tablet Take 1 tablet by mouth daily.    Reported, Patient   aspirin 81 MG EC tablet Take 1 tablet (81 mg) by mouth daily . Please be seen in clinic with labs for further refills. 7/27/18   Cody Lloyd MD   B Complex-C-Folic Acid (DIALYVITE PO) Take by mouth daily    Reported, Patient   WAYLON CONTOUR test strip  7/2/18   Reported, Patient   blood glucose monitoring (PRODIGY TWIST TOP 28G) lancets  7/2/18   Reported, Patient   Budesonide-Formoterol Fumarate (SYMBICORT IN) Inhale 2 puffs into the lungs 2 times daily     Reported, Patient   CALCIUM ANTACID ULTRA 1000 MG CHEW  7/2/18   Reported, Patient   Calcium Carbonate Antacid  (CALCIUM ANTACID PO) Take 1,000 mg by mouth    Reported, Patient   carvedilol (COREG) 12.5 MG tablet Take 25 mg by mouth daily  9/20/17   Natalie Rodas NP   Cholecalciferol (VITAMIN D3 PO) Take 2,000 Units by mouth daily     Reported, Patient   Cholestyramine (QUESTRAN PO) Take by mouth as needed     Reported, Patient   ciclopirox (PENLAC) 8 % SOLN Apply to adjacent skin and affected nails daily.  Remove with alcohol every 7 days, then repeat.    Reported, Patient   clonazePAM (KLONOPIN) 0.5 MG tablet  10/27/17   Reported, Patient   Colchicine (COLCRYS PO) Take 0.6 mg by mouth 2 times daily    Reported, Patient   cyclobenzaprine (FLEXERIL) 10 MG tablet  7/3/18   Reported, Patient   Ezetimibe (ZETIA PO) Take 10 mg by mouth    Reported, Patient   fish oil-omega-3 fatty acids 1000 MG capsule Take 2 g by mouth daily    Reported, Patient   FUROSEMIDE PO Take 80 mg by mouth daily    Reported, Patient   GABAPENTIN PO Take 100 mg by mouth At Bedtime    Reported, Patient   gentamicin (GARAMYCIN) 0.1 % cream  5/26/18   Reported, Patient   hydrALAZINE (APRESOLINE) 100 MG TABS Take 1 tablet by mouth 3 times daily. 4/26/12   Cody Lloyd MD   isosorbide mononitrate (IMDUR) 60 MG 24 hr tablet Take 1 tablet (60 mg) by mouth 2 times daily DO NOT TAKE WITH SILDENAFIL (viagra) 8/8/17   Cody Lloyd MD   ketoconazole (NIZORAL) 2 % cream Please use twice daily for 3 months 7/5/18   Jaxon Watson MD   ketoconazole (NIZORAL) 2 % shampoo Please use every other day to scalp and affected areas of face 7/5/18   Jaxon Watson MD   lidocaine (XYLOCAINE) 5 % ointment  7/2/18   Reported, Patient   Lidocaine HCl 2 % CREA Externally apply 1 dose topically daily 2/25/14   Ranjan Boo DPM   loperamide (IMODIUM A-D) 2 MG tablet Take 1 tablet (2 mg) by mouth 3 times daily as needed Take 2mg (1 tablet) 30 minutes before eating.  Patient not taking: Reported on 8/6/2018 5/29/18   Sanford Menendez MD   losartan (COZAAR) 100  MG tablet Take 1 tablet by mouth daily. 4/26/12   Cody Lloyd MD   MAGNESIUM OXIDE PO Take 400 mg by mouth daily    Reported, Patient   MAVYRET 100-40 MG TABS  4/18/18   Reported, Patient   MINOXIDIL PO Take 2.5 mg by mouth daily    Reported, Patient   mupirocin (BACTROBAN) 2 % ointment  7/2/18   Reported, Patient   niacin (NIASPAN) 500 MG CR tablet  7/2/18   Reported, Patient   niacin (SLO-NIACIN) 500 MG CR tablet Take 1 tablet (500 mg) by mouth At Bedtime 9/20/17   Natalie Rodas, NP   omeprazole (PRILOSEC) 40 MG capsule Take 1 capsule by mouth as needed     Reported, Patient   ONDANSETRON PO Take 4 mg by mouth 3 times daily    Reported, Patient   OTHER MEDICAL SUPPLIES CPAP therapy    Reported, Patient   [facility] (DIANEAL PD-2.5%, CALCIUM 3.5 MEQ/L,) 396 MOSM/L SOLN 1,000 mLs by Dialysis route once for 1 dose 4/4/16 7/5/18  Shelby Woo, APRN CNS   POLYETHYLENE GLYCOL EX     Reported, Patient   psyllium 0.52 G capsule Take 1 capsule (0.52 g) by mouth daily  Patient not taking: Reported on 8/6/2018 12/29/17   Sanford Menendez MD   quiNINE 324 MG capsule Take 1 capsule (324 mg) by mouth nightly as needed for moderate pain  Patient not taking: Reported on 8/6/2018 3/27/18   Sanford Menendez MD   senna-docusate (YASMINE-COLACE) 8.6-50 MG per tablet Take 1-2 tablets by mouth 2 times daily as needed for constipation  Patient not taking: Reported on 8/6/2018 3/16/16   Ezio Almonte MD   sildenafil (VIAGRA) 50 MG tablet Take 0.5 tablets by mouth 8/2/16   Reported, Patient   TRAZODONE HCL PO Take 100 mg by mouth daily    Reported, Patient   triamcinolone (KENALOG) 0.025 % cream Please use to affected areas of legs 3-4 days, once weekly to affected areas of the face  Patient not taking: Reported on 8/6/2018 7/5/18   Jaxon Watson MD   UNABLE TO FIND MEDICATION NAME: Liquid Calcium 1 teaspoon w/ and between meals    Reported, Patient   UNABLE TO FIND 1 tsp. MEDICATION NAME: calcium     Reported, Patient   UNABLE TO FIND MEDICATION NAME: Centamicin Cream prn    Reported, Patient   Varenicline Tartrate (CHANTIX PO) Take 1 mg by mouth 2 times daily as needed     Reported, Patient   venlafaxine (EFFEXOR-XR) 37.5 MG 24 hr capsule  5/26/18   Reported, Patient       Vitals:  There were no vitals taken for this visit. There were no vitals taken for this visit.    Exam:  GENERAL APPEARANCE: alert and no distress  HENT: mouth without ulcers or lesions  LYMPHATICS: no cervical or supraclavicular nodes  RESP: lungs clear to auscultation - no rales, rhonchi or wheezes,   CV: regular rhythm, normal rate, no rub, no murmur  EDEMA: trace LE edema bilaterally  ABDOMEN: soft, nondistended, nontender, bowel sounds normal  MS: extremities normal - no gross deformities noted, no evidence of inflammation in joints, no muscle tenderness, +1 femoral pulses bhavna  SKIN: no rash, nail clubbing     Results:   Labs and imaging were ordered for this visit and reviewed by me.  No results found for this or any previous visit (from the past 336 hour(s)).  Electrolytes/Renal -   Recent Labs   Lab Test  04/30/18   1356  12/21/17   1217  09/19/17   0952   02/22/16   1757  02/15/16   1412  01/18/16   1232   NA  139  140  140   < >  138  138  136   POTASSIUM  4.1  4.6  3.7   < >  4.1  4.5  4.1   CHLORIDE  105  106  102   < >  110*  111*  106   CO2  22  21  23   < >  18*  20  21   BUN  73*  72*  57*   < >  72*  54*  61*   CR  17.10*  17.30*  17.30*   < >  6.50*  5.55*  6.61*   GLC  116*  99  93   < >  105*  81  91   BETH  8.5  7.5*  7.2*   < >  8.0*  8.0*  8.6   PHOS   --    --    --    --   3.3  3.2  4.2    < > = values in this interval not displayed.       CBC -   Recent Labs   Lab Test  04/30/18   1356  12/21/17   1217  03/16/16   0808  02/22/16   1757   WBC  7.9  5.2   --   5.1   HGB  9.7*  9.7*  9.6*  11.7*   PLT  227  205   --   175       LFTs -   Recent Labs   Lab Test  04/30/18   1356  12/21/17   1217  04/07/16   1159   ALKPHOS   58  56  63   BILITOTAL  0.3  0.4  0.2   ALT  19  24  49   AST  17  <3  22   PROTTOTAL  7.6  7.6  7.3   ALBUMIN  3.3*  3.6  3.6       Coags -   Recent Labs   Lab Test  04/30/18   1356  12/21/17   1217   INR  0.96  1.05       Iron Panel -   Recent Labs   Lab Test  02/15/16   1412   IRON  116   IRONSAT  36   POONAM  458*       Endocrine -   Recent Labs   Lab Test  01/06/16   1051  06/29/15   0957  11/14/13   0831   11/11/10   1248   A1C  5.7  5.6  5.4   < >  5.4   TSH   --    --    --    --   1.37    < > = values in this interval not displayed.             PSA   Date Value Ref Range Status   05/22/2006 0.47 0 - 4 ug/L Final     PFT Latest Ref Rng & Units 3/15/2016   FVC L 3.05   FEV1 L 2.04   FVC% % 73   FEV1% % 61     Patient seen and discussed  with Dr. Sanchez.  Aguilar Chaney  Nephrology Fellow  Pager: [phone]    Patient was seen and evaluated by me, Angel Sanchez MD. I have reviewed the note and agree with the the plan of care as documented by the fellow.    I had the pleasure seeing Mr. Fan today for kidney transplant evaluation.  He is a 58-year-old -American gentleman with long-standing diabetes and biopsy-proven hypertensive kidney disease.  He had reached end-stage kidney disease about 2 years ago and has been on peritoneal dialysis since.  Lately his dialysis may not be entirely adequate.  He is followed closely with Dr. Castorena and making appropriate adjustments.  His blood pressure is well controlled in fact slightly overcontrolled during today's visit.  He is asymptomatic in this regard.  In terms of functional capacity he is able to walk at least 4 blocks without any chest pains or shortness of breath.  He is able to climb about 3 flights of steps.  He is not aware of formal cardiac evaluation that had ever been done.  He is now scheduled to see cardiology for risk stratification with us.  From age-appropriate cancer screening his prostate specific antigen was within normal in 2016, he had a  colonoscopy in 2016 that revealed 2 polyps one was tubular adenoma low-grade and the second polyp that was inflammatory.  He will be repeating his colonoscopy in 2019 or sooner if his ongoing diarrhea persists.  From a psychosocial standpoint he appears to be compliant with his medication and dialysis and he will be formally evaluated by our .    1.  Transplant candidacy evaluation.    2.  End-stage kidney disease due to hypertension with biopsy-proven changes.    3.  Age-appropriate cancer screening: Patient is up-to-date on his colonoscopy will need to repeat PSA and owing to his long term smoking history will go ahead and recommend low-dose CT scan to rule out lung cancer.  4.  From surgical suitability standpoint his femoral pulses were felt at 1+ bilateral he had CT scan done at the [facility] and would like to obtain the actual imaging CD for review with our surgeons.  5.  COPD patient is currently on Symbicort inhaler with albuterol for rescue.  He was advised to identify a pulmonologist and follow-up with them on regular basis.    Overall Rommel Fan is a reasonable candidate for kidney transplant pending the resolution of the above.  Mr. Fan's case will be discussed during our multidisciplinary meeting at which point a final candidacy decision will be looked rendered.

## 2018-08-06 NOTE — MR AVS SNAPSHOT
After Visit Summary   8/6/2018    Rommel Fan    MRN: [id number]           Patient Information     Date Of Birth          1960        Visit Information        Provider Department      8/6/2018 11:30 AM  PKE PATIENT 4 [facility] Solid Organ Transplant        Today's Diagnoses     Prostate disorder    -  1    Hepatitis C virus carrier state (H)        Cardiovascular disease        End stage renal disease (H)        History of tobacco use        Organ transplant candidate        Obstructive sleep apnea           Follow-ups after your visit        Your next 10 appointments already scheduled     Aug 17, 2018  8:00 AM CDT   US AORTA/IVC/ILIAC DUPLEX COMPLETE with [id number] ([facility])    [address] [place] [id number] [phone]           Please bring a list of your medicines (including vitamins, minerals and over-the-counter drugs). Also, tell your doctor about any allergies you may have. Wear comfortable clothes and leave your valuables at home.  Adults: No eating or drinking for 8 hours before the exam. You may take medicine with a small sip of water.  Children: - Infants, breast-fed: may have breast milk up to 2 hours before exam. - Infants, formula: may have bottle until 4 hours before exam. - Children 1-5 years: No food or drink for 4 hours before exam. - Children 6 -12 years: No food or drink for 6 hours before exam. - Children over 12 years: No food or drink for 8 hours before exam. - J Tube Fed: No need to stop feedings.  Please call the Imaging Department at your exam site with any questions.            Aug 17, 2018  9:00 AM CDT   US AORTIC IMAGING with [facility]  ([facility] [place] [facility])    [address] [id number] [phone]           Please bring a list of your medicines (including vitamins, minerals and over-the-counter drugs).  Also, tell your doctor about any allergies you may have. Wear comfortable clothes and leave your valuables at home.  No eating or drinking for 8 hours before the exam. You may take medicine with a small sip of water.  Please call the Imaging Department at your exam site with any questions.            Sep 13, 2018  3:15 PM CDT   (Arrive by 3:00 PM)   Return Visit with Jaxon Watson MD   [facility] ([facility])    [address]  3rd Floor  [facility] [id number] [phone]            Oct 26, 2018 11:20 AM CDT   (Arrive by 11:05 AM)   Return Visit with Wes Guerra   [facility] ([facility])    [address]  4th Floor  [facility] [id number] [phone]            Nov 15, 2018  5:00 PM CST   (Arrive by 4:45 PM)   Return Visit with Cody Lloyd MD   [facility] ([facility])    [address] [place] [address]  [address]  [facility] [id number] [phone]              Future tests that were ordered for you today     Open Future Orders        Priority Expected Expires Ordered    CT Abdomen Pelvis w/o Contrast STAT  8/6/2019 8/6/2018            Who to contact     If you have questions or need follow up information about today's clinic visit or your schedule please contact [facility] directly at [phone].  Normal or non-critical lab and imaging results will be communicated to you by MyChart, letter or phone within 4 business days after the clinic has received the results. If you do not hear from us within 7 days, please contact the clinic through MyChart or phone. If you have a critical or abnormal lab result, we will notify you by phone as soon as possible.  Submit refill requests through [email or web address] or call your pharmacy and they will forward the refill request to us. Please allow 3 business days for your refill to be completed.          Additional  "Information About Your Visit        MyChart Information     [facility] gives you secure access to your electronic health record. If you see a primary care provider, you can also send messages to your care team and make appointments. If you have questions, please call your primary care clinic.  If you do not have a primary care provider, please call [phone] and they will assist you.        Care EveryWhere ID     This is your Care EveryWhere ID. This could be used by other organizations to access your [facility] medical records  [id number]        Your Vitals Were     Pulse Temperature Height Pulse Oximetry BMI (Body Mass Index)       77 98.4  F (36.9  C) (Oral) 1.727 m (5' 8\") 98% 34.12 kg/m2        Blood Pressure from Last 3 Encounters:   08/06/18 115/66   07/06/18 145/88   03/27/18 (!) 145/98    Weight from Last 3 Encounters:   08/06/18 101.8 kg (224 lb 6.4 oz)   07/06/18 100.5 kg (221 lb 8 oz)   07/10/18 97.5 kg (215 lb)              We Performed the Following     PSA total and free        Primary Care Provider Office Phone # Fax #    Chris Christi [phone]       [facility] [address]  [place] [facility] [address]        Equal Access to Services     SONYA BONILLA : Hadii aad ku hadasho Soomaali, waaxda luqadaha, qaybta kaalmada adeegyada, waxay terry hayceleste mancini . So [facility] [phone].    ATENCIÓN: Si habla español, tiene a das disposición servicios gratuitos de asistencia lingüística. Llame al [phone].    We comply with applicable federal civil rights laws and [place] laws. We do not discriminate on the basis of race, color, national origin, age, disability, sex, sexual orientation, or gender identity.            Thank you!     Thank you for choosing [facility]  for your care. Our goal is always to provide you with excellent care. Hearing back from our patients is one way we can continue to improve our services. Please take a few " minutes to complete the written survey that you may receive in the mail after your visit with us. Thank you!             Your Updated Medication List - Protect others around you: Learn how to safely use, store and throw away your medicines at www.disposemymeds.org.          This list is accurate as of 8/6/18 11:59 PM.  Always use your most recent med list.                   Brand Name Dispense Instructions for use Diagnosis    * albuterol 108 (90 Base) MCG/ACT Inhaler    PROAIR HFA/PROVENTIL HFA/VENTOLIN HFA     Inhale 2 puffs into the lungs every 6 hours as needed        * albuterol (2.5 MG/3ML) 0.083% neb solution           ALLOPURINOL PO      Take 200 mg by mouth daily        alpha-d-galactosidase tablet      Take 1 tablet by mouth as needed for intestinal gas        amLODIPine 10 MG tablet    NORVASC     Take 1 tablet by mouth daily.        aspirin 81 MG EC tablet     90 tablet    Take 1 tablet (81 mg) by mouth daily . Please be seen in clinic with labs for further refills.    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 renal disorders, Cough, Shortness of breath       CHANTIX PO      Take 1 mg by mouth 2 times daily as needed        clonazePAM 0.5 MG tablet    klonoPIN          COLCRYS PO      Take 0.6 mg by mouth 2 times daily        cyclobenzaprine 10 MG tablet    FLEXERIL          DIALYVITE PO      Take by mouth daily        dianeal PD-2.5% dex (calcium 3.5 mEq/L) 396 MOSM/L Soln CAPD     1000 mL    1,000 mLs by Dialysis route once for 1 dose    PD catheter dysfunction, initial  encounter (H), ESRD (end stage renal disease) (H)       fish oil-omega-3 fatty acids 1000 MG capsule      Take 2 g by mouth daily        FUROSEMIDE PO      Take 80 mg by mouth daily        GABAPENTIN PO      Take 100 mg by mouth At Bedtime        gentamicin 0.1 % cream    GARAMYCIN          hydrALAZINE 100 MG Tabs tablet    APRESOLINE    90 tablet    Take 1 tablet by mouth 3 times daily.    Hypertension       isosorbide mononitrate 60 MG 24 hr tablet    IMDUR    180 tablet    Take 1 tablet (60 mg) by mouth 2 times daily DO NOT TAKE WITH SILDENAFIL (viagra)    HTN (hypertension)       * ketoconazole 2 % shampoo    NIZORAL    120 mL    Please use every other day to scalp and affected areas of face    Dermatitis, seborrheic       * ketoconazole 2 % cream    NIZORAL    60 g    Please use twice daily for 3 months    Dermatitis, seborrheic       lidocaine 5 % ointment    XYLOCAINE          Lidocaine HCl 2 % Crea     60 mL    Externally apply 1 dose topically daily    Pain in limb, Plantar fascial fibromatosis       loperamide 2 MG tablet    IMODIUM A-D    30 tablet    Take 1 tablet (2 mg) by mouth 3 times daily as needed Take 2mg (1 tablet) 30 minutes before eating.    Diarrhea, unspecified type       losartan 100 MG tablet    COZAAR    30 tablet    Take 1 tablet by mouth daily.    Hypertension       MAGNESIUM OXIDE PO      Take 400 mg by mouth daily        MAVYRET 100-40 MG Tabs   Generic drug:  Glecaprevir-Pibrentasvir           MINOXIDIL PO      Take 2.5 mg by mouth daily        mupirocin 2 % ointment    BACTROBAN          niacin 500 MG CR tablet    SLO-NIACIN     Take 1 tablet (500 mg) by mouth At Bedtime    Hypertriglyceridemia       niacin 500 MG CR tablet    NIASPAN          omeprazole 40 MG capsule    priLOSEC     Take 1 capsule by mouth as needed        ONDANSETRON PO      Take 4 mg by mouth 3 times daily        OTHER MEDICAL SUPPLIES      CPAP therapy        PENLAC 8 % Soln   Generic drug:  ciclopirox      Apply  to adjacent skin and affected nails daily.  Remove with alcohol every 7 days, then repeat.        POLYETHYLENE GLYCOL EX           psyllium 0.52 g capsule     540 capsule    Take 1 capsule (0.52 g) by mouth daily    Diarrhea, unspecified type       QUESTRAN PO      Take by mouth as needed        quiNINE 324 MG capsule     100 capsule    Take 1 capsule (324 mg) by mouth nightly as needed for moderate pain    Chronic hepatitis C without hepatic coma (H)       senna-docusate 8.6-50 MG per tablet    YASMINE-COLACE    50 tablet    Take 1-2 tablets by mouth 2 times daily as needed for constipation    Peritoneal dialysis status (H)       sildenafil 50 MG tablet    VIAGRA     Take 0.5 tablets by mouth        SYMBICORT IN      Inhale 2 puffs into the lungs 2 times daily        TRAZODONE HCL PO      Take 100 mg by mouth daily        triamcinolone 0.025 % cream    KENALOG    80 g    Please use to affected areas of legs 3-4 days, once weekly to affected areas of the face    Xerosis of skin, Nummular eczema       * UNABLE TO FIND      1 tsp. MEDICATION NAME: calcium        * UNABLE TO FIND      MEDICATION NAME: Centamicin Cream prn        UNABLE TO FIND      MEDICATION NAME: Liquid Calcium 1 teaspoon w/ and between meals        venlafaxine 37.5 MG 24 hr capsule    EFFEXOR-XR          VITAMIN D3 PO      Take 2,000 Units by mouth daily        ZETIA PO      Take 10 mg by mouth        * Notice:  This list has 8 medication(s) that are the same as other medications prescribed for you. Read the directions carefully, and ask your doctor or other care provider to review them with you.

## 2018-08-06 NOTE — LETTER
8/6/2018       RE: Rommel Fan  [address]   [place] [address]     Dear Colleague,    Thank you for referring your patient, Rommel Fan, to the [facility] SOLID ORGAN TRANSPLANT at [facility]. Please see a copy of my visit note below.    Assessment and Plan:  1. Kidney transplant evaluation - patient is a good candidate overall. Benefits of a living donor transplant were discussed.  -- needs pre screening workup repeated colonoscopy, low dose CT chest lung cancer screening, updated PSA.,   -- needs cardiology evaluation ischemia workup Echo, stress test, EKG.      2. ESKD from Hypertension on PD dialysis since 2016, no hx of cathter related issue, no hx of peritonitis, recently his dialysis is inadequate and now with uremic symptoms. Likely related to constipation.  -- will follow up with his nephrology for management.     3. Hep C: seen by GI     4. Colon polyp: seen in colonoscopy 2016 tubular adenoma low grade dysplasia and inflammatory polyp.    -- repeated colonoscopy before 2019.     5. Hx of smoking: more than 30 years, quit 2016  -- need low grade CT scan.    6. COPD/sleep apnea: on home medications inhaler, use C-pap daily, need pulmonary evaluation and updated PFT.    7. Depression: on Effexor, no suicide thought.      Discussed the risks and benefits of a transplant, including the risk of surgery and immunosuppression medications.  Patients overall evaluation will be discussed in the Transplant Program's regular meeting with a final recommendation on the patients suitability for transplant to be made at that time.    Consult:  Rommel Fan was seen in consultation at the request of Dr. Winnie Colmenares for evaluation as a potential Kidney transplant recipient.    Reason for Visit:  Rommel Fan is a 58 year old male with ESKD from Hypertension, who presents for Kidney transplant evaluation.    HPI:  Patient 58 year old  male with  past medical history of ESRD due to hypertensive nephropathy biopsy-proven 2010, started on peritoneal dialysis in 2016, his primary nephrologist is Dr. Dayana Castorena, patient also history of hypertension hyperlipidemia, sleep apnea, hep C, COPD, asthma, he is interested in kidney transplant and is being referred by his nephrologist,  Hx of hep C and followed up with Dr Shon sheppard now, hx of colon polyp seen in colonoscopy 2016 tubular adenoma and inflammatory polyp need follow up scheduled for repeated colonoscopy soon, no hx of cancer but more than 30 years smoker quit 2016, his recent PFT shows COPD changes moderate air way obstruction now on inhaler, no acute exacerbation, he has not been evaluated or screen for lung cancer, CXR was unremarkable, he dose not have any limitation to his physical activity and can walk more than 2 block with no chest pain or sob, no hx of cardiac disease but never been evaluated before, he was seen by dermatology last month no cancer lesion, no hx of prostate cancer and his PSA  Last checked was normal he need updated one. Recently had an issue with his PD dialysis is inadequate he was having diarrhea and then started on antidiarrhea medication which make him constipated. He feels fatigue tired nauseated with poor appetite and metallic taste in his mouth. He denies any chest pain or sob no fever no cough, no other complaint.          Kidney Disease Hx:        Kidney Disease Dx: ESRD on PD        Biopsy Proven: Yes; 2010 hypertensive nephropathy          On Dialysis: Yes, Date initiated: 2016, Dialysis Type: PD; and Dialysis unit: [facility]       Primary Nephrologist: Dayana Castorena          Medical Hx:       h/o HTN: Yes  Usual BP: controlled        h/o DM:  No       h/o Protein in Urine: Yes        h/o Blood in Urine:  No       h/o Kidney Stones:  No       h/o UTI: No       h/o Chronic NSAID Use: No         Previous Transplant Hx:       No         Transplant  Sensitization Hx:       Previous Tx: No       Blood Transfusion: No       Pregnancy: Not applicable         Uremic Symptoms:       Fatigue: Yes; Cold: Yes; Nausea: Yes; Poor Appetite: Yes; Metallic Taste: Yes; Edema: No; Pruritis: No; Tremor: No;         Cardiovascular Hx:       h/o Cardiac Issues: No       Exercise Tolerance: no chest pain or shortness of breath with exertion.         Health Maintenance:       Colonoscopy: Not up to date, Dermatology: Up to date and Dental: Not up to date         Potential Donor(s): No    ROS:  A comprehensive review of systems was obtained and negative, except as noted in the HPI or PMH.    PMH:   Medical records were reviewed  and PMH was discussed with patient and noted below.  Past Medical History:   Diagnosis Date     Asthma 1979     COPD (chronic obstructive pulmonary disease) (H) 1988     Depressive disorder     currently on Effexor     End stage renal failure on dialysis (H)     Started dialysis 4/29/16     Gastrointestinal problem      Hepatitis C 2018    Treated this 2018     Hyperlipidemia LDL goal <100 4/3/2013     Hypertension      Hypertriglyceridemia 7/13/2015     Obstructive sleep apnea      Sinus problem        PSH:   Past Surgical History:   Procedure Laterality Date     BIOPSY      Kidney @ [facility]     CHOLECYSTECTOMY  2013     COLONOSCOPY  2016    [facility]     LAPAROSCOPIC INSERTION CATHETER PERITONEAL DIALYSIS N/A 3/16/2016    Procedure: LAPAROSCOPIC INSERTION CATHETER PERITONEAL DIALYSIS;  Surgeon: Winnie Colmenares MD;  Location: [facility] OR     SEPTOPLASTY, TURBINOPLASTY, COMBINED  Nov 2008    inferior turbinate reduction and septoplasty due to deviation to right     TURBINOPLASTY  July 2008    bilateral inferior turbinate reduction     TURBINOPLASTY  1/27/2014    Procedure: TURBINOPLASTY;  Bilateral Turbinate Reduction ;  Surgeon: Cindy Carrasquillo MD;  Location: [facility] OR     Personal or family history of bleeding or anesthesia problems: No    Family Hx:  Family History    Problem Relation Age of Onset     Asthma Mother      Diabetes Mother      Hypertension Mother      Cerebrovascular Disease Mother      Cancer Mother      Alcohol/Drug Mother      HEART DISEASE Mother      Asthma Sister      Asthma Sister      Asthma Sister      Asthma Brother      Asthma Brother      Asthma Brother      HEART DISEASE Sister      Diabetes Sister        Personal Hx:   Social History     Social History     Marital status: Single     Spouse name: Leonela     Number of children: 1     Years of education: N/A     Occupational History      Self Employed.     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 Authorizing Provider   albuterol (2.5 MG/3ML) 0.083% neb solution  7/3/18   Reported, Patient   albuterol (PROAIR HFA, PROVENTIL HFA, VENTOLIN HFA) 108 (90 BASE) MCG/ACT inhaler Inhale 2 puffs into the lungs every 6 hours as needed     Reported, Patient   ALLOPURINOL PO Take 200 mg by mouth daily     Reported, Patient   alpha-d-galactosidase (BEANO) tablet Take 1 tablet by mouth as needed for intestinal gas    Reported, Patient   amLODIPine (NORVASC) 10 MG tablet Take 1 tablet by mouth daily.    Reported, Patient   aspirin 81 MG EC tablet Take 1 tablet (81 mg) by mouth daily . Please be seen in clinic with labs for further refills. 7/27/18   Cody Lloyd MD   B Complex-C-Folic Acid (DIALYVITE PO) Take by mouth daily    Reported, Patient   WAYLON CONTOUR test strip  7/2/18   Reported, Patient    blood glucose monitoring (PRODIGY TWIST TOP 28G) lancets  7/2/18   Reported, Patient   Budesonide-Formoterol Fumarate (SYMBICORT IN) Inhale 2 puffs into the lungs 2 times daily     Reported, Patient   CALCIUM ANTACID ULTRA 1000 MG CHEW  7/2/18   Reported, Patient   Calcium Carbonate Antacid (CALCIUM ANTACID PO) Take 1,000 mg by mouth    Reported, Patient   carvedilol (COREG) 12.5 MG tablet Take 25 mg by mouth daily  9/20/17   Natalie Rodas, NP   Cholecalciferol (VITAMIN D3 PO) Take 2,000 Units by mouth daily     Reported, Patient   Cholestyramine (QUESTRAN PO) Take by mouth as needed     Reported, Patient   ciclopirox (PENLAC) 8 % SOLN Apply to adjacent skin and affected nails daily.  Remove with alcohol every 7 days, then repeat.    Reported, Patient   clonazePAM (KLONOPIN) 0.5 MG tablet  10/27/17   Reported, Patient   Colchicine (COLCRYS PO) Take 0.6 mg by mouth 2 times daily    Reported, Patient   cyclobenzaprine (FLEXERIL) 10 MG tablet  7/3/18   Reported, Patient   Ezetimibe (ZETIA PO) Take 10 mg by mouth    Reported, Patient   fish oil-omega-3 fatty acids 1000 MG capsule Take 2 g by mouth daily    Reported, Patient   FUROSEMIDE PO Take 80 mg by mouth daily    Reported, Patient   GABAPENTIN PO Take 100 mg by mouth At Bedtime    Reported, Patient   gentamicin (GARAMYCIN) 0.1 % cream  5/26/18   Reported, Patient   hydrALAZINE (APRESOLINE) 100 MG TABS Take 1 tablet by mouth 3 times daily. 4/26/12   Cody Lloyd MD   isosorbide mononitrate (IMDUR) 60 MG 24 hr tablet Take 1 tablet (60 mg) by mouth 2 times daily DO NOT TAKE WITH SILDENAFIL (viagra) 8/8/17   Cody Lloyd MD   ketoconazole (NIZORAL) 2 % cream Please use twice daily for 3 months 7/5/18   Jaxon Watson MD   ketoconazole (NIZORAL) 2 % shampoo Please use every other day to scalp and affected areas of face 7/5/18   Jaxon Watson MD   lidocaine (XYLOCAINE) 5 % ointment  7/2/18   Reported, Patient   Lidocaine HCl 2 % CREA Externally  apply 1 dose topically daily 2/25/14   Ranjan Boo DPM   loperamide (IMODIUM A-D) 2 MG tablet Take 1 tablet (2 mg) by mouth 3 times daily as needed Take 2mg (1 tablet) 30 minutes before eating.  Patient not taking: Reported on 8/6/2018 5/29/18   Sanford Menendez MD   losartan (COZAAR) 100 MG tablet Take 1 tablet by mouth daily. 4/26/12   Cody Lloyd MD   MAGNESIUM OXIDE PO Take 400 mg by mouth daily    Reported, Patient   MAVYRET 100-40 MG TABS  4/18/18   Reported, Patient   MINOXIDIL PO Take 2.5 mg by mouth daily    Reported, Patient   mupirocin (BACTROBAN) 2 % ointment  7/2/18   Reported, Patient   niacin (NIASPAN) 500 MG CR tablet  7/2/18   Reported, Patient   niacin (SLO-NIACIN) 500 MG CR tablet Take 1 tablet (500 mg) by mouth At Bedtime 9/20/17   Natalie Rodas, NP   omeprazole (PRILOSEC) 40 MG capsule Take 1 capsule by mouth as needed     Reported, Patient   ONDANSETRON PO Take 4 mg by mouth 3 times daily    Reported, Patient   OTHER MEDICAL SUPPLIES CPAP therapy    Reported, Patient   Peritoneal [employer]ialysis [employer] (DIANEAL PD-2.5%, CALCIUM 3.5 MEQ/L,) 396 MOSM/L SOLN 1,000 mLs by Dialysis route once for 1 dose 4/4/16 7/5/18  Shelby Woo, APRN CNS   POLYETHYLENE GLYCOL EX     Reported, Patient   psyllium 0.52 G capsule Take 1 capsule (0.52 g) by mouth daily  Patient not taking: Reported on 8/6/2018 12/29/17   Sanford Menendez MD   quiNINE 324 MG capsule Take 1 capsule (324 mg) by mouth nightly as needed for moderate pain  Patient not taking: Reported on 8/6/2018 3/27/18   Sanford Menendez MD   senna-docusate (YASMINE-COLACE) 8.6-50 MG per tablet Take 1-2 tablets by mouth 2 times daily as needed for constipation  Patient not taking: Reported on 8/6/2018 3/16/16   Ezio Almonte MD   sildenafil (VIAGRA) 50 MG tablet Take 0.5 tablets by mouth 8/2/16   Reported, Patient   TRAZODONE HCL PO Take 100 mg by mouth daily    Reported, Patient   triamcinolone (KENALOG) 0.025 % cream Please use to affected  areas of legs 3-4 days, once weekly to affected areas of the face  Patient not taking: Reported on 8/6/2018 7/5/18   Jaxon Watson MD   UNABLE TO FIND MEDICATION NAME: Liquid Calcium 1 teaspoon w/ and between meals    Reported, Patient   UNABLE TO FIND 1 tsp. MEDICATION NAME: calcium    Reported, Patient   UNABLE TO FIND MEDICATION NAME: Centamicin Cream prn    Reported, Patient   Varenicline Tartrate (CHANTIX PO) Take 1 mg by mouth 2 times daily as needed     Reported, Patient   venlafaxine (EFFEXOR-XR) 37.5 MG 24 hr capsule  5/26/18   Reported, Patient       Vitals:  There were no vitals taken for this visit. There were no vitals taken for this visit.    Exam:  GENERAL APPEARANCE: alert and no distress  HENT: mouth without ulcers or lesions  LYMPHATICS: no cervical or supraclavicular nodes  RESP: lungs clear to auscultation - no rales, rhonchi or wheezes,   CV: regular rhythm, normal rate, no rub, no murmur  EDEMA: trace LE edema bilaterally  ABDOMEN: soft, nondistended, nontender, bowel sounds normal  MS: extremities normal - no gross deformities noted, no evidence of inflammation in joints, no muscle tenderness, +1 femoral pulses bhavna  SKIN: no rash, nail clubbing     Results:   Labs and imaging were ordered for this visit and reviewed by me.  No results found for this or any previous visit (from the past 336 hour(s)).  Electrolytes/Renal -   Recent Labs   Lab Test  04/30/18   1356  12/21/17   1217  09/19/17   0952   02/22/16   1757  02/15/16   1412  01/18/16   1232   NA  139  140  140   < >  138  138  136   POTASSIUM  4.1  4.6  3.7   < >  4.1  4.5  4.1   CHLORIDE  105  106  102   < >  110*  111*  106   CO2  22  21  23   < >  18*  20  21   BUN  73*  72*  57*   < >  72*  54*  61*   CR  17.10*  17.30*  17.30*   < >  6.50*  5.55*  6.61*   GLC  116*  99  93   < >  105*  81  91   BETH  8.5  7.5*  7.2*   < >  8.0*  8.0*  8.6   PHOS   --    --    --    --   3.3  3.2  4.2    < > = values in this interval not  displayed.       CBC -   Recent Labs   Lab Test  04/30/18   1356  12/21/17   1217  03/16/16   0808  02/22/16   1757   WBC  7.9  5.2   --   5.1   HGB  9.7*  9.7*  9.6*  11.7*   PLT  227  205   --   175       LFTs -   Recent Labs   Lab Test  04/30/18   1356  12/21/17   1217  04/07/16   1159   ALKPHOS  58  56  63   BILITOTAL  0.3  0.4  0.2   ALT  19  24  49   AST  17  <3  22   PROTTOTAL  7.6  7.6  7.3   ALBUMIN  3.3*  3.6  3.6       Coags -   Recent Labs   Lab Test  04/30/18   1356  12/21/17   1217   INR  0.96  1.05       Iron Panel -   Recent Labs   Lab Test  02/15/16   1412   IRON  116   IRONSAT  36   POONAM  458*       Endocrine -   Recent Labs   Lab Test  01/06/16   1051  06/29/15   0957  11/14/13   0831   11/11/10   1248   A1C  5.7  5.6  5.4   < >  5.4   TSH   --    --    --    --   1.37    < > = values in this interval not displayed.             PSA   Date Value Ref Range Status   05/22/2006 0.47 0 - 4 ug/L Final     PFT Latest Ref Rng & Units 3/15/2016   FVC L 3.05   FEV1 L 2.04   FVC% % 73   FEV1% % 61     Patient seen and discussed  with Dr. Sanchez.  Aguilar Chaney  Nephrology Fellow  Pager: [phone]    Patient was seen and evaluated by me, Angel Sanchez MD. I have reviewed the note and agree with the the plan of care as documented by the fellow.    I had the pleasure seeing Mr. Fan today for kidney transplant evaluation.  He is a 58-year-old -American gentleman with long-standing diabetes and biopsy-proven hypertensive kidney disease.  He had reached end-stage kidney disease about 2 years ago and has been on peritoneal dialysis since.  Lately his dialysis may not be entirely adequate.  He is followed closely with Dr. Castorena and making appropriate adjustments.  His blood pressure is well controlled in fact slightly overcontrolled during today's visit.  He is asymptomatic in this regard.  In terms of functional capacity he is able to walk at least 4 blocks without any chest pains or shortness of  breath.  He is able to climb about 3 flights of steps.  He is not aware of formal cardiac evaluation that had ever been done.  He is now scheduled to see cardiology for risk stratification with us.  From age-appropriate cancer screening his prostate specific antigen was within normal in 2016, he had a colonoscopy in 2016 that revealed 2 polyps one was tubular adenoma low-grade and the second polyp that was inflammatory.  He will be repeating his colonoscopy in 2019 or sooner if his ongoing diarrhea persists.  From a psychosocial standpoint he appears to be compliant with his medication and dialysis and he will be formally evaluated by our .    1.  Transplant candidacy evaluation.    2.  End-stage kidney disease due to hypertension with biopsy-proven changes.    3.  Age-appropriate cancer screening: Patient is up-to-date on his colonoscopy will need to repeat PSA and owing to his long term smoking history will go ahead and recommend low-dose CT scan to rule out lung cancer.  4.  From surgical suitability standpoint his femoral pulses were felt at 1+ bilateral he had CT scan done at the [facility] and would like to obtain the actual imaging CD for review with our surgeons.  5.  COPD patient is currently on Symbicort inhaler with albuterol for rescue.  He was advised to identify a pulmonologist and follow-up with them on regular basis.    Overall Rommel Fan is a reasonable candidate for kidney transplant pending the resolution of the above.  Mr. Fan's case will be discussed during our multidisciplinary meeting at which point a final candidacy decision will be looked rendered.    Again, thank you for allowing me to participate in the care of your patient.      Sincerely,    REYES

## 2018-08-15 NOTE — COMMITTEE REVIEW
Abdominal Committee Review Note     Evaluation Date: 8/6/2018  Committee Review Date: 8/15/2018    Organ being evaluated for: Kidney    Transplant Phase: Evaluation  Transplant Status: Active    Transplant Coordinator: Luci Minor  Transplant Surgeon:       Referring Physician: Dayana Castorena    Primary Diagnosis:   Secondary Diagnosis:     Committee Review Members:  Nephrology Doreen Haley PA-C, Cody Carpenter MD, Angel Sanchez MD, Viral Willard Brown MD   Nurse Practitioner Maria E Gomez NP   Nutrition Annamaria Michelle,    Pharmacy [facility]ras[facility]    - Clinical Isabel Sy, [facility]   Transplant Doreen Haley PA-C, Cesar Hanson MD, Maria E Gomez NP, Rosario Moncada, KAYCEE, Ana Tucker, RN, Luci Minor, RN, Wilberto Martin MD, Marcia Alva RN       Transplant Eligibility: Irreversible chronic kidney disease treated w/dialysis or expected need for dialysis    Committee Review Decision: Needs Re-presentation    Relative Contraindications: None    Absolute Contraindications: None    Committee Chair Cesar Hanson MD verbally attested to the committee's decision.    Committee Discussion Details: Only brief discussion of this case as the providers who saw Rommel were not present; I will put this case on the agenda for full discussion next week; 8/22/18.  In the meantime the team advises we move forward with additional imaging and Neuropsych (reviewed 's concerns).  I contacted Rommel and told him that we plan to add tests and will be re-discussing.  I also informed Rommel that his ABO was missed so please get that drawn on next visit to clinic.  He agreed to extra testing and states understanding that we have not made a firm decision yet and may not be able to fully decide about transplanting him until after appointment with Dr Lloyd in November.  I told him that eventually he will get my summary letter.      Outstanding  issues:  1.  Imaging   Aorta & iliac US scheduled 8/17/18   Abdominal CT to check poor pulses per Rob Gomez   Fibroscan to f/u past Hep C Rx  2.   ABO X 2 missed on eval day. Needs these drawn on a return visit.  3.   Neuropsych eval  4.  Low-dose chest CT (?with PCP)  5.  Pulmonary evaluation/establish ongoing follow-up (MBD to clarify this with Nephrology team)  6.  Colonoscopy before 2019? (MBD to clarify this)  7.  *Cardiology risk assessment - appointment with Dr Lloyd 11/15  8.  Dental  9.  Weight loss

## 2018-08-21 NOTE — PROGRESS NOTES
Attempted to reach patient to remind that 3 month post hepatitis C treatment labs are due today,8/21, no answer, message left requesting call back, number provided.

## 2018-09-14 NOTE — PROGRESS NOTES
Called pt to review 3 month post hepatitis c treatment lab results. Pt's lab from 9/5 showed that the hepatitis c virus was not detected and, per Dr. Menendez, pt is cured of hepatitis c. Pt does not need further follow up in the hepatology clinic. Pt verbalized understanding of this information.

## 2018-09-19 NOTE — TELEPHONE ENCOUNTER
Medication requested: triamcinolone (KENALOG) 0.025 % cream  Last refilled: 18  Qty: 80   MED REC:  we will prescribe triamcinolone cream 0.025% for 3-4 days and once a week on the face if needed    Last seen: 18  RTC: 3 MOS  Next appt: 18  Refill decision:   RF UNTIL APPT.      ketoconazole (NIZORAL) 2 % cream   Last Written Prescription Date:  18  Last Fill Quantity: 60 G,   # refills: 1    Routing refill request to provider for review/approval because:  MAY RF 12 MOS FROM LAST VISIT   MED OVERRIDE

## 2018-09-20 NOTE — TELEPHONE ENCOUNTER
As resident on call, received refill request. Chart and attached communication reviewed. Patient last seen 7/5/18. Per Dr. Watson, plan at that time was to continue ketoconazole cream and rtc in 3 months. Rx refilled with 11 refills.    Trudy Jacobo MD  PGY-4, Dermatology  [phone]  Resident on Call

## 2018-09-24 NOTE — NURSING NOTE
Dermatology Rooming Note    Rommel Fan's goals for this visit include:   Chief Complaint   Patient presents with     Derm Problem     Rommel is here for a rash follow  up. He states that it has improved since his last visit.      Cheryl Ulloa LPN
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
## 2018-09-24 NOTE — MR AVS SNAPSHOT
After Visit Summary   9/24/2018    Rommel Fan    MRN: [id number]           Patient Information     Date Of Birth          1960        Visit Information        Provider Department      9/24/2018 5:15 PM Jaxon Watson MD [facility] D[facility]        Today's Diagnoses     Pyogenic granuloma    -  1    Dermatitis, seborrheic          Care Instructions    Impression/Plan:  1. Seborrheic dermatitis   Recommend ketoconazole shampoo 2% every other day and ketoconazole cream twice  daily for three months ==> continue with this treatment   Maybe add on Sunday and Saturday evening on lesions on nose and behind ears Triamcinolone cream 0.025% (only 1-2 times per week; otherwise use Ketoconazole)    2. Xerosis    Recommend moisturize twice daily, vigilantly, with lotion or cream such as vanicream, cerave, cetaphil, eucerin    3. Granuloma pyogenicum on the entrance of the peritoneal dialysis orifice (about 1mm diameter and rather dry now)   Disinfect the area around the orifice with Gentian violet every evening --> keep it dry!   If pyogenic granuloma grows again, then we would burn it out with electrokauterization. Then patient should come back to us          Follow-ups after your visit        Your next 10 appointments already scheduled     Oct 26, 2018 11:20 AM CDT   (Arrive by 11:05 AM)   Return Visit with Wes Guerra   [facility] ([facility])    [address] [place] [id number]   [phone]            Oct 26, 2018  1:00 PM CDT   (Arrive by 12:45 PM)   FIBROSIS SCAN with UC FIBROSIS SCAN   [facility] Hepatology ([facility])    [address] [place] [address]  [address] [place] [id number]   [phone]            Nov 15, 2018  5:00 PM CST   (Arrive by 4:45 PM)   Return Visit with Cody Lloyd MD   [facility])    [address] [place] [address]  [address]  [facility] [address] [phone]              Who to contact     Please call your clinic at [phone] to:    Ask questions about your health    Make or cancel appointments    Discuss your medicines    Learn about your test results    Speak to your doctor            Additional Information About Your Visit        MyChart Information     [employer] gives you secure access to your electronic health record. If you see a primary care provider, you can also send messages to your care team and make appointments. If you have questions, please call your primary care clinic.  If you do not have a primary care provider, please call [phone] and they will assist you.      [employer] is an electronic gateway that provides easy, online access to your medical records. With [employer], you can request a clinic appointment, read your test results, renew a prescription or communicate with your care team.     To access your existing account, please contact your [facility] or call [phone] for assistance.        Care EveryWhere ID     This is your Care EveryWhere ID. This could be used by other organizations to access your [place] medical records  [id number]         Blood Pressure from Last 3 Encounters:   08/06/18 115/66   07/06/18 145/88   03/27/18 (!) 145/98    Weight from Last 3 Encounters:   08/06/18 101.8 kg (224 lb 6.4 oz)   07/06/18 100.5 kg (221 lb 8 oz)   07/10/18 97.5 kg (215 lb)              Today, you had the following     No orders found for display         Today's Medication Changes          These changes are accurate as of 9/24/18  5:37 PM.  If you have any questions, ask your nurse or doctor.               Start taking these medicines.        Dose/Directions    gentian violet 2 % solution   Used for:  Pyogenic granuloma   Started by:  Jaxon Watson MD        Pain every evening around orifice of peritoneal dialysis to dry and disinfect   Quantity:  59 mL   Refills:  3          These medicines have changed or have updated prescriptions.        Dose/Directions    * ketoconazole 2 % shampoo   Commonly known as:  NIZORAL   This may have changed:  Another medication with the same name was added. Make sure you understand how and when to take each.   Used for:  Dermatitis, seborrheic   Changed by:  Jaxon Watson MD        Please use every other day to scalp and affected areas of face   Quantity:  120 mL   Refills:  3       * ketoconazole 2 % cream   Commonly known as:  NIZORAL   This may have changed:  Another medication with the same name was added. Make sure you understand how and when to take each.   Used for:  Dermatitis, seborrheic   Changed by:  Jaxon Watson MD        Please use twice daily for 3 months   Quantity:  60 g   Refills:  11       * ketoconazole 2 % cream   Commonly known as:  NIZORAL   This may have changed:  You were already taking a medication with the same name, and this prescription was added. Make sure you understand how and when to take each.   Used for:  Dermatitis, seborrheic   Changed by:  Jaxon Watson MD        Apply topically daily 1-2 times daily on face   Quantity:  60 g   Refills:  11       * ketoconazole 2 % shampoo   Commonly known as:  NIZORAL   This may have changed:  You were already taking a medication with the same name, and this prescription was added. Make sure you understand how and when to take each.   Used for:  Dermatitis, seborrheic   Changed by:  Jaxon Watson MD        Apply topically three times a week   Quantity:  120 mL   Refills:  11       * triamcinolone 0.025 % cream   Commonly known as:  KENALOG   This may have changed:  Another medication with the same name was added. Make sure you understand how and when to take each.   Used for:  Xerosis of skin, Nummular eczema   Changed by:  Jaxon Watson MD        Please use to affected areas of legs 3-4 days, once weekly to affected areas of the face   Quantity:  80  g   Refills:  0       * triamcinolone 0.025 % cream   Commonly known as:  KENALOG   This may have changed:  You were already taking a medication with the same name, and this prescription was added. Make sure you understand how and when to take each.   Used for:  Dermatitis, seborrheic   Changed by:  Jaxon Watson MD        Put on scaly lesions around nose and behind ears on Sunday and maybe Saturday evening (not more than 2 times weekly)   Quantity:  80 g   Refills:  1       * Notice:  This list has 6 medication(s) that are the same as other medications prescribed for you. Read the directions carefully, and ask your doctor or other care provider to review them with you.         Where to get your medicines      These medications were sent to [address] [address]     Phone:  [phone]     gentian violet 2 % solution    ketoconazole 2 % cream    ketoconazole 2 % shampoo    triamcinolone 0.025 % cream                Primary Care Provider Office Phone # Fax #    [place] [phone]       [facility] [address]  [place] [facility] [address]        Equal Access to Services     MERVIN BONILLA AH: Hadii diane ku hadasho Soomaali, waaxda luqadaha, qaybta kaalmada adeegyada, waxay terry hayanjeln raine mendez. So [facility] [phone].    ATENCIÓN: Si habla español, tiene a das disposición servicios gratuitos de asistencia lingüística. [facility] [phone].    We comply with applicable federal civil rights laws and [place] laws. We do not discriminate on the basis of race, color, national origin, age, disability, sex, sexual orientation, or gender identity.            Thank you!     Thank you for choosing [facility] DERMATOLOGY  for your care. Our goal is always to provide you with excellent care. Hearing back from our patients is one way we can continue to improve our services. Please take a few minutes to complete the  written survey that you may receive in the mail after your visit with us. Thank you!             Your Updated Medication List - Protect others around you: Learn how to safely use, store and throw away your medicines at www.disposemymeds.org.          This list is accurate as of 9/24/18  5:37 PM.  Always use your most recent med list.                   Brand Name Dispense Instructions for use Diagnosis    * albuterol 108 (90 Base) MCG/ACT inhaler    PROAIR HFA/PROVENTIL HFA/VENTOLIN HFA     Inhale 2 puffs into the lungs every 6 hours as needed        * albuterol (2.5 MG/3ML) 0.083% neb solution           ALLOPURINOL PO      Take 200 mg by mouth daily        alpha-d-galactosidase tablet      Take 1 tablet by mouth as needed for intestinal gas        amLODIPine 10 MG tablet    NORVASC     Take 1 tablet by mouth daily.        aspirin 81 MG EC tablet     90 tablet    Take 1 tablet (81 mg) by mouth daily . Please be seen in clinic with labs for further refills.    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 renal disorders, Cough, Shortness of breath       CHANTIX PO      Take 1 mg by mouth 2 times daily as needed        clonazePAM 0.5 MG tablet    klonoPIN          COLCRYS PO      Take 0.6 mg by mouth 2 times daily        cyclobenzaprine 10 MG tablet    FLEXERIL          DIALYVITE PO      Take by mouth daily        dianeal PD-2.5% dex (calcium 3.5 mEq/L) 396 MOSM/L Soln CAPD     1000 mL    1,000 mLs by Dialysis route once for 1 dose    PD catheter dysfunction, initial encounter (H), ESRD (end stage  renal disease) (H)       fish oil-omega-3 fatty acids 1000 MG capsule      Take 2 g by mouth daily        FUROSEMIDE PO      Take 80 mg by mouth daily        GABAPENTIN PO      Take 100 mg by mouth At Bedtime        gentamicin 0.1 % cream    GARAMYCIN          gentian violet 2 % solution     59 mL    Pain every evening around orifice of peritoneal dialysis to dry and disinfect    Pyogenic granuloma       hydrALAZINE 100 MG Tabs tablet    APRESOLINE    90 tablet    Take 1 tablet by mouth 3 times daily.    Hypertension       isosorbide mononitrate 60 MG 24 hr tablet    IMDUR    180 tablet    Take 1 tablet (60 mg) by mouth 2 times daily DO NOT TAKE WITH SILDENAFIL (viagra). Please follow up in clinic as scheduled with labs prior.    HTN (hypertension)       * ketoconazole 2 % shampoo    NIZORAL    120 mL    Please use every other day to scalp and affected areas of face    Dermatitis, seborrheic       * ketoconazole 2 % cream    NIZORAL    60 g    Please use twice daily for 3 months    Dermatitis, seborrheic       * ketoconazole 2 % cream    NIZORAL    60 g    Apply topically daily 1-2 times daily on face    Dermatitis, seborrheic       * ketoconazole 2 % shampoo    NIZORAL    120 mL    Apply topically three times a week    Dermatitis, seborrheic       lidocaine 5 % ointment    XYLOCAINE          Lidocaine HCl 2 % Crea     60 mL    Externally apply 1 dose topically daily    Pain in limb, Plantar fascial fibromatosis       loperamide 2 MG tablet    IMODIUM A-D    30 tablet    Take 1 tablet (2 mg) by mouth 3 times daily as needed Take 2mg (1 tablet) 30 minutes before eating.    Diarrhea, unspecified type       losartan 100 MG tablet    COZAAR    30 tablet    Take 1 tablet by mouth daily.    Hypertension       MAGNESIUM OXIDE PO      Take 400 mg by mouth daily        MAVYRET 100-40 MG per tablet   Generic drug:  glecaprevir-pibrentasvir           MINOXIDIL PO      Take 2.5 mg by mouth daily        mupirocin 2 % ointment     BACTROBAN          niacin 500 MG CR tablet    SLO-NIACIN     Take 1 tablet (500 mg) by mouth At Bedtime    Hypertriglyceridemia       niacin 500 MG CR tablet    NIASPAN          omeprazole 40 MG capsule    priLOSEC     Take 1 capsule by mouth as needed        ONDANSETRON PO      Take 4 mg by mouth 3 times daily        OTHER MEDICAL SUPPLIES      CPAP therapy        PENLAC 8 % Soln   Generic drug:  ciclopirox      Apply to adjacent skin and affected nails daily.  Remove with alcohol every 7 days, then repeat.        POLYETHYLENE GLYCOL EX           psyllium 0.52 g capsule     540 capsule    Take 1 capsule (0.52 g) by mouth daily    Diarrhea, unspecified type       QUESTRAN PO      Take by mouth as needed        quiNINE 324 MG capsule     100 capsule    Take 1 capsule (324 mg) by mouth nightly as needed for moderate pain    Chronic hepatitis C without hepatic coma (H)       senna-docusate 8.6-50 MG per tablet    YASMINE-COLACE    50 tablet    Take 1-2 tablets by mouth 2 times daily as needed for constipation    Peritoneal dialysis status (H)       sildenafil 50 MG tablet    VIAGRA     Take 0.5 tablets by mouth        SYMBICORT IN      Inhale 2 puffs into the lungs 2 times daily        TRAZODONE HCL PO      Take 100 mg by mouth daily        * triamcinolone 0.025 % cream    KENALOG    80 g    Please use to affected areas of legs 3-4 days, once weekly to affected areas of the face    Xerosis of skin, Nummular eczema       * triamcinolone 0.025 % cream    KENALOG    80 g    Put on scaly lesions around nose and behind ears on Sunday and maybe Saturday evening (not more than 2 times weekly)    Dermatitis, seborrheic       * UNABLE TO FIND      1 tsp. MEDICATION NAME: calcium        * UNABLE TO FIND      MEDICATION NAME: Centamicin Cream prn        UNABLE TO FIND      MEDICATION NAME: Liquid Calcium 1 teaspoon w/ and between meals        venlafaxine 37.5 MG 24 hr capsule    EFFEXOR-XR          VITAMIN D3 PO      Take 2,000  Units by mouth daily        ZETIA PO      Take 10 mg by mouth        * Notice:  This list has 12 medication(s) that are the same as other medications prescribed for you. Read the directions carefully, and ask your doctor or other care provider to review them with you.

## 2018-09-24 NOTE — PATIENT INSTRUCTIONS
Impression/Plan:  1. Seborrheic dermatitis   Recommend ketoconazole shampoo 2% every other day and ketoconazole cream twice  daily for three months ==> continue with this treatment   Maybe add on Sunday and Saturday evening on lesions on nose and behind ears Triamcinolone cream 0.025% (only 1-2 times per week; otherwise use Ketoconazole)    2. Xerosis    Recommend moisturize twice daily, vigilantly, with lotion or cream such as vanicream, cerave, cetaphil, eucerin    3. Granuloma pyogenicum on the entrance of the peritoneal dialysis orifice (about 1mm diameter and rather dry now)   Disinfect the area around the orifice with Gentian violet every evening --> keep it dry!   If pyogenic granuloma grows again, then we would burn it out with electrokauterization. Then patient should come back to us

## 2018-09-25 NOTE — TELEPHONE ENCOUNTER
[facility] Call Center    Phone Message    May a detailed message be left on voicemail: no    Reason for Call: Other: Pharmacy is requesting a call back regarding the gentian violet 2 % solution.  Pharmacy states they only have 1%.  Please follow up at [phone].     Action Taken: Message routed to:  [facility] ([facility] derm

## 2018-09-27 NOTE — TELEPHONE ENCOUNTER
Called pt he confirmed for Sol Nathaniel appt added to Fri, Oct 26 with GI & Fib scan already rescheduled, mailing schedule today to pt

## 2018-10-26 NOTE — MR AVS SNAPSHOT
After Visit Summary   10/26/2018    Rommel Fan    MRN: [id number]           Patient Information     Date Of Birth          1960        Visit Information        Provider Department      10/26/2018 9:00 AM Neeta Armstrong PsyD [facility]         Follow-ups after your visit        Your next 10 appointments already scheduled     Nov 15, 2018  5:00 PM CST   (Arrive by 4:45 PM)   Return Visit with Cody Lloyd MD   [facility] ([facility])    [address] [place] [address]  [address]  [facility] [address] [phone]            Nov 26, 2018 12:40 PM CST   (Arrive by 12:25 PM)   RETURN FOOT/ANKLE with Ranjan Boo DPM   [facility] ([facility])    [address] [place] [address]  4th Floor  [facility] [address] [phone]              Who to contact     Please call your clinic at [phone] to:    Ask questions about your health    Make or cancel appointments    Discuss your medicines    Learn about your test results    Speak to your doctor            Additional Information About Your Visit        [employer] Information     [employer] gives you secure access to your electronic health record. If you see a primary care provider, you can also send messages to your care team and make appointments. If you have questions, please call your primary care clinic.  If you do not have a primary care provider, please call [phone] and they will assist you.      Luminate is an electronic gateway that provides easy, online access to your medical records. With [employer], you can request a clinic appointment, read your test results, renew a prescription or communicate with your care team.     To access your existing account, please contact your [facility] or call [phone] for assistance.        Care EveryWhere ID     This is your Care EveryWhere ID. This could be used by other organizations to  access your [facility] medical records  [id number]         Blood Pressure from Last 3 Encounters:   No data found for BP    Weight from Last 3 Encounters:   No data found for Wt              Today, you had the following     No orders found for display         Today's Medication Changes          These changes are accurate as of 10/26/18 11:59 PM.  If you have any questions, ask your nurse or doctor.               Start taking these medicines.        Dose/Directions    polyethylene glycol powder   Commonly known as:  MIRALAX   Used for:  Constipation, unspecified constipation type   Started by:  Wes Guerra        Dose:  1 capful   Take 17 g (1 capful) by mouth 2 times daily   Quantity:  510 g   Refills:  3         Stop taking these medicines if you haven't already. Please contact your care team if you have questions.     loperamide 2 MG tablet   Commonly known as:  IMODIUM A-D   Stopped by:  Wes Guerra                Where to get your medicines      These medications were sent to [address] [address]     Phone:  [phone]     polyethylene glycol powder    psyllium 0.52 g capsule                Primary Care Provider Office Phone # Fax #    Chris Barraza [phone]       [facility] [address]  [facility] [address]        Equal Access to Services     SONYA BONILLA AH: Hadii aad ku hadasho Soomaali, waaxda luqadaha, qaybta kaalmada adeegyada, waxay terry mendez. So [facility] [phone].    ATENCIÓN: Si habla español, tiene a das disposición servicios gratuitos de asistencia lingüística. Llame al [phone].    We comply with applicable federal civil rights laws and [place] laws. We do not discriminate on the basis of race, color, national origin, age, disability, sex, sexual orientation, or gender identity.            Thank you!     Thank you for choosing [facility]   for your care. Our goal is always to provide you with excellent care. Hearing back from our patients is one way we can continue to improve our services. Please take a few minutes to complete the written survey that you may receive in the mail after your visit with us. Thank you!             Your Updated Medication List - Protect others around you: Learn how to safely use, store and throw away your medicines at www.disposemymeds.org.          This list is accurate as of 10/26/18 11:59 PM.  Always use your most recent med list.                   Brand Name Dispense Instructions for use Diagnosis    * albuterol 108 (90 Base) MCG/ACT inhaler    PROAIR HFA/PROVENTIL HFA/VENTOLIN HFA     Inhale 2 puffs into the lungs every 6 hours as needed        * albuterol (2.5 MG/3ML) 0.083% neb solution           ALLOPURINOL PO      Take 200 mg by mouth daily        alpha-d-galactosidase tablet      Take 1 tablet by mouth as needed for intestinal gas        amLODIPine 10 MG tablet    NORVASC     Take 1 tablet by mouth daily.        aspirin 81 MG EC tablet     90 tablet    Take 1 tablet (81 mg) by mouth daily . Please be seen in clinic with labs for further refills.    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 renal disorders, Cough, Shortness of breath       CHANTIX PO      Take 1 mg by mouth 2 times daily as needed        clonazePAM 0.5 MG tablet    klonoPIN          COLCRYS PO      Take 0.6 mg by mouth 2 times daily        cyclobenzaprine 10 MG tablet    FLEXERIL          DIALYVITE PO      Take by mouth  daily        dianeal PD-2.5% dex (calcium 3.5 mEq/L) 396 MOSM/L Soln CAPD     1000 mL    1,000 mLs by Dialysis route once for 1 dose    PD catheter dysfunction, initial encounter (H), ESRD (end stage renal disease) (H)       FUROSEMIDE PO      Take 80 mg by mouth daily        GABAPENTIN PO      Take 100 mg by mouth At Bedtime        gentamicin 0.1 % cream    GARAMYCIN          gentian violet 2 % solution     59 mL    Pain every evening around orifice of peritoneal dialysis to dry and disinfect    Pyogenic granuloma       hydrALAZINE 100 MG Tabs tablet    APRESOLINE    90 tablet    Take 1 tablet by mouth 3 times daily.    Hypertension       isosorbide mononitrate 60 MG 24 hr tablet    IMDUR    180 tablet    Take 1 tablet (60 mg) by mouth 2 times daily DO NOT TAKE WITH SILDENAFIL (viagra). Please follow up in clinic as scheduled with labs prior.    HTN (hypertension)       * ketoconazole 2 % shampoo    NIZORAL    120 mL    Please use every other day to scalp and affected areas of face    Dermatitis, seborrheic       * ketoconazole 2 % cream    NIZORAL    60 g    Please use twice daily for 3 months    Dermatitis, seborrheic       * ketoconazole 2 % cream    NIZORAL    60 g    Apply topically daily 1-2 times daily on face    Dermatitis, seborrheic       * ketoconazole 2 % shampoo    NIZORAL    120 mL    Apply topically three times a week    Dermatitis, seborrheic       lidocaine 5 % ointment    XYLOCAINE          Lidocaine HCl 2 % Crea     60 mL    Externally apply 1 dose topically daily    Pain in limb, Plantar fascial fibromatosis       losartan 100 MG tablet    COZAAR    30 tablet    Take 1 tablet by mouth daily.    Hypertension       MAGNESIUM OXIDE PO      Take 400 mg by mouth daily        MAVYRET 100-40 MG per tablet   Generic drug:  glecaprevir-pibrentasvir           MINOXIDIL PO      Take 2.5 mg by mouth daily        mupirocin 2 % ointment    BACTROBAN          niacin 500 MG CR tablet    SLO-NIACIN     Take 1  tablet (500 mg) by mouth At Bedtime    Hypertriglyceridemia       niacin 500 MG CR tablet    NIASPAN          omeprazole 40 MG capsule    priLOSEC     Take 1 capsule by mouth as needed        ONDANSETRON PO      Take 4 mg by mouth 3 times daily        OTHER MEDICAL SUPPLIES      CPAP therapy        PENLAC 8 % Soln   Generic drug:  ciclopirox      Apply to adjacent skin and affected nails daily.  Remove with alcohol every 7 days, then repeat.        polyethylene glycol powder    MIRALAX    510 g    Take 17 g (1 capful) by mouth 2 times daily    Constipation, unspecified constipation type       psyllium 0.52 g capsule     540 capsule    Take 1 capsule (0.52 g) by mouth daily    Diarrhea, unspecified type       quiNINE 324 MG capsule     100 capsule    Take 1 capsule (324 mg) by mouth nightly as needed for moderate pain    Chronic hepatitis C without hepatic coma (H)       senna-docusate 8.6-50 MG per tablet    YASMINE-COLACE    50 tablet    Take 1-2 tablets by mouth 2 times daily as needed for constipation    Peritoneal dialysis status (H)       sildenafil 50 MG tablet    VIAGRA     Take 0.5 tablets by mouth        SYMBICORT IN      Inhale 2 puffs into the lungs 2 times daily        TRAZODONE HCL PO      Take 100 mg by mouth daily        * triamcinolone 0.025 % cream    KENALOG    80 g    Please use to affected areas of legs 3-4 days, once weekly to affected areas of the face    Xerosis of skin, Nummular eczema       * triamcinolone 0.025 % cream    KENALOG    80 g    Put on scaly lesions around nose and behind ears on Sunday and maybe Saturday evening (not more than 2 times weekly)    Dermatitis, seborrheic       * UNABLE TO FIND      1 tsp. MEDICATION NAME: calcium        * UNABLE TO FIND      MEDICATION NAME: Centamicin Cream prn        UNABLE TO FIND      MEDICATION NAME: Liquid Calcium 1 teaspoon w/ and between meals        venlafaxine 37.5 MG 24 hr capsule    EFFEXOR-XR          VITAMIN D3 PO      Take 2,000 Units  by mouth daily        ZETIA PO      Take 10 mg by mouth        * Notice:  This list has 12 medication(s) that are the same as other medications prescribed for you. Read the directions carefully, and ask your doctor or other care provider to review them with you.

## 2018-10-26 NOTE — TELEPHONE ENCOUNTER
[facility] Call Center    Phone Message    May a detailed message be left on voicemail: yes    Reason for Call: Medication Question or concern regarding medication   Prescription Clarification  Name of Medication: psyllium 0.52 g capsule & polyethylene glycol (MIRALAX) powder  Prescribing Provider: Dr. Guerra    Pharmacy: [facility] [address]      What on the order needs clarification? Leesa from [facility] called to ask if the RX can be signed from another provider. Pt [place] and needs a signature by a provider who is also enrolled in [place].     Action Taken: Message routed to:  [facility] ([facility] GI

## 2018-10-26 NOTE — PROGRESS NOTES
GI CLINIC VISIT    CC/REFERRING MD:  Chris Barraza  REASON FOR CONSULTATION:   The pt is a 58 year old male who I was asked to see in consultation at the request of Dr. Chris Barraza for follow up of diarrhea.     ASSESSMENT/PLAN:  58-year-old man with HCV s/p Mavryet with SVR and end-stage renal disease secondary to hypertension requiring peritoneal dialysis who presents for follow-up of chronic diarrhea.     In a turn of events, he is now having constipation.  Diarrhea is lately stopped without any intervention.  He is not taking loperamide.  He is comfortable starting MiraLAX and prefers to follow-up as needed.    There are no contraindications to a renal transplant from our standpoint.  His last colonoscopy was August 23, 2016.  I cannot see the report, but other notes reference that only one tubular adenoma was found and he should have repeat surveillance colonoscopy per standard interval (depends on size of TA).    - start miralax, titrate to having soft BMs    RTC PRN    Patient seen and discussed with attending, Dr. Holguin.    Thank you for this consultation.  It was a pleasure to participate in the care of this patient; please contact us with any further questions.    Wes Guerra MD  Fellow  Division of Gastroenterology, Hepatology and Nutrition  [facility]    HPI  58-year-old man with HCV s/p Mavryet with SVR and end-stage renal disease secondary to hypertension requiring peritoneal dialysis who presents for follow-up of chronic diarrhea.     At our last visit, he endorsed approximately 6 years of having 4-5 watery brown bowel movements per day in addition to nocturnal defecation.  Without any intervention, he is now having a maximum of 2 bowel movements per day and a minimum of 1 bowel movement every other day.  These are always hard clumps.  He was told by someone (sounds like a person at his dialysis center) that constipation may affect his peritoneal dialysis clearance.  He would  like to start MiraLAX.  He denies abdominal pain.    ROS:    No fevers or chills  No weight loss  No blurry vision, double vision or change in vision  No sore throat  No lymphadenopathy  No headache, paraesthesias, or weakness in a limb  No shortness of breath or wheezing  No chest pain or pressure  No arthralgias or myalgias  No rashes or skin changes  No odynophagia or dysphagia  No BRBPR, hematochezia, melena  No dysuria, frequency or urgency  No hot/cold intolerance or polyria  No anxiety or depression    PERTINENT PAST MEDICAL HISTORY:  Past Medical History:   Diagnosis Date     Asthma 1979     COPD (chronic obstructive pulmonary disease) (H) 1988     Depressive disorder     currently on Effexor     End stage renal failure on dialysis (H)     Started dialysis 4/29/16     Gastrointestinal problem      Hepatitis C 2018    Treated this 2018     Hyperlipidemia LDL goal <100 4/3/2013     Hypertension      Hypertriglyceridemia 7/13/2015     Obstructive sleep apnea      Sinus problem      PREVIOUS SURGERIES:  Past Surgical History:   Procedure Laterality Date     BIOPSY      Kidney @ [facility]     CHOLECYSTECTOMY  2013     COLONOSCOPY  2016    [facility]     LAPAROSCOPIC INSERTION CATHETER PERITONEAL DIALYSIS N/A 3/16/2016    Procedure: LAPAROSCOPIC INSERTION CATHETER PERITONEAL DIALYSIS;  Surgeon: Winnie Colmenares MD;  Location: [facility] OR     SEPTOPLASTY, TURBINOPLASTY, COMBINED  Nov 2008    inferior turbinate reduction and septoplasty due to deviation to right     TURBINOPLASTY  July 2008    bilateral inferior turbinate reduction     TURBINOPLASTY  1/27/2014    Procedure: TURBINOPLASTY;  Bilateral Turbinate Reduction ;  Surgeon: Cindy Carrasquillo MD;  Location: [facility]     PREVIOUS ENDOSCOPY:  Colonoscopy 8/23/16 at [facility]- unable to see result    ALLERGIES:  No Known Allergies    PERTINENT MEDICATIONS:    Current Outpatient Prescriptions:      polyethylene glycol (MIRALAX) powder, Take 17 g (1 capful) by mouth 2 times daily,  Disp: 510 g, Rfl: 3     psyllium 0.52 g capsule, Take 1 capsule (0.52 g) by mouth daily, Disp: 540 capsule, Rfl: 1     albuterol (2.5 MG/3ML) 0.083% neb solution, , Disp: , Rfl:      albuterol (PROAIR HFA, PROVENTIL HFA, VENTOLIN HFA) 108 (90 BASE) MCG/ACT inhaler, Inhale 2 puffs into the lungs every 6 hours as needed , Disp: , Rfl:      ALLOPURINOL PO, Take 200 mg by mouth daily , Disp: , Rfl:      alpha-d-galactosidase (BEANO) tablet, Take 1 tablet by mouth as needed for intestinal gas, Disp: , Rfl:      amLODIPine (NORVASC) 10 MG tablet, Take 1 tablet by mouth daily., Disp: , Rfl:      aspirin 81 MG EC tablet, Take 1 tablet (81 mg) by mouth daily . Please be seen in clinic with labs for further refills., Disp: 90 tablet, Rfl: 0     B Complex-C-Folic Acid (DIALYVITE PO), Take by mouth daily, Disp: , Rfl:      WAYLON CONTOUR test strip, , Disp: , Rfl:      blood glucose monitoring (PRODIGY TWIST TOP 28G) lancets, , Disp: , Rfl:      Budesonide-Formoterol Fumarate (SYMBICORT IN), Inhale 2 puffs into the lungs 2 times daily , Disp: , Rfl:      CALCIUM ANTACID ULTRA 1000 MG CHEW, , Disp: , Rfl:      Calcium Carbonate Antacid (CALCIUM ANTACID PO), Take 1,000 mg by mouth, Disp: , Rfl:      carvedilol (COREG) 12.5 MG tablet, Take 25 mg by mouth daily , Disp: 180 tablet, Rfl: 3     Cholecalciferol (VITAMIN D3 PO), Take 2,000 Units by mouth daily , Disp: , Rfl:      ciclopirox (PENLAC) 8 % SOLN, Apply to adjacent skin and affected nails daily.  Remove with alcohol every 7 days, then repeat., Disp: , Rfl:      clonazePAM (KLONOPIN) 0.5 MG tablet, , Disp: , Rfl:      Colchicine (COLCRYS PO), Take 0.6 mg by mouth 2 times daily, Disp: , Rfl:      cyclobenzaprine (FLEXERIL) 10 MG tablet, , Disp: , Rfl:      Ezetimibe (ZETIA PO), Take 10 mg by mouth, Disp: , Rfl:      FUROSEMIDE PO, Take 80 mg by mouth daily, Disp: , Rfl:      GABAPENTIN PO, Take 100 mg by mouth At Bedtime, Disp: , Rfl:      gentamicin (GARAMYCIN) 0.1 % cream, ,  Disp: , Rfl:      gentian violet 2 % solution, Pain every evening around orifice of peritoneal dialysis to dry and disinfect, Disp: 59 mL, Rfl: 3     hydrALAZINE (APRESOLINE) 100 MG TABS, Take 1 tablet by mouth 3 times daily., Disp: 90 tablet, Rfl: 2     isosorbide mononitrate (IMDUR) 60 MG 24 hr tablet, Take 1 tablet (60 mg) by mouth 2 times daily DO NOT TAKE WITH SILDENAFIL (viagra). Please follow up in clinic as scheduled with labs prior., Disp: 180 tablet, Rfl: 0     ketoconazole (NIZORAL) 2 % cream, Apply topically daily 1-2 times daily on face, Disp: 60 g, Rfl: 11     ketoconazole (NIZORAL) 2 % cream, Please use twice daily for 3 months, Disp: 60 g, Rfl: 11     ketoconazole (NIZORAL) 2 % shampoo, Apply topically three times a week, Disp: 120 mL, Rfl: 11     ketoconazole (NIZORAL) 2 % shampoo, Please use every other day to scalp and affected areas of face, Disp: 120 mL, Rfl: 3     lidocaine (XYLOCAINE) 5 % ointment, , Disp: , Rfl:      Lidocaine HCl 2 % CREA, Externally apply 1 dose topically daily, Disp: 60 mL, Rfl: 0     losartan (COZAAR) 100 MG tablet, Take 1 tablet by mouth daily., Disp: 30 tablet, Rfl: 2     MAGNESIUM OXIDE PO, Take 400 mg by mouth daily, Disp: , Rfl:      MAVYRET 100-40 MG TABS, , Disp: , Rfl:      MINOXIDIL PO, Take 2.5 mg by mouth daily, Disp: , Rfl:      mupirocin (BACTROBAN) 2 % ointment, , Disp: , Rfl:      niacin (NIASPAN) 500 MG CR tablet, , Disp: , Rfl:      niacin (SLO-NIACIN) 500 MG CR tablet, Take 1 tablet (500 mg) by mouth At Bedtime, Disp: , Rfl: 3     omeprazole (PRILOSEC) 40 MG capsule, Take 1 capsule by mouth as needed , Disp: , Rfl:      ONDANSETRON PO, Take 4 mg by mouth 3 times daily, Disp: , Rfl:      OTHER MEDICAL SUPPLIES, CPAP therapy, Disp: , Rfl:      Peritoneal Dialysis Solutions (DIANEAL PD-2.5%, CALCIUM 3.5 MEQ/L,) 396 MOSM/L SOLN, 1,000 mLs by Dialysis route once for 1 dose, Disp: 1000 mL, Rfl: 0     quiNINE 324 MG capsule, Take 1 capsule (324 mg) by mouth  nightly as needed for moderate pain, Disp: 100 capsule, Rfl: 3     senna-docusate (YASMINE-COLACE) 8.6-50 MG per tablet, Take 1-2 tablets by mouth 2 times daily as needed for constipation, Disp: 50 tablet, Rfl: 0     sildenafil (VIAGRA) 50 MG tablet, Take 0.5 tablets by mouth, Disp: , Rfl:      TRAZODONE HCL PO, Take 100 mg by mouth daily, Disp: , Rfl:      triamcinolone (KENALOG) 0.025 % cream, Put on scaly lesions around nose and behind ears on Sunday and maybe Saturday evening (not more than 2 times weekly), Disp: 80 g, Rfl: 1     triamcinolone (KENALOG) 0.025 % cream, Please use to affected areas of legs 3-4 days, once weekly to affected areas of the face, Disp: 80 g, Rfl: 0     UNABLE TO FIND, MEDICATION NAME: Liquid Calcium 1 teaspoon w/ and between meals, Disp: , Rfl:      UNABLE TO FIND, 1 tsp. MEDICATION NAME: calcium, Disp: , Rfl:      UNABLE TO FIND, MEDICATION NAME: Centamicin Cream prn, Disp: , Rfl:      Varenicline Tartrate (CHANTIX PO), Take 1 mg by mouth 2 times daily as needed , Disp: , Rfl:      venlafaxine (EFFEXOR-XR) 37.5 MG 24 hr capsule, , Disp: , Rfl:   No current facility-administered medications for this visit.     Facility-Administered Medications Ordered in Other Visits:      hydrALAZINE (APRESOLINE) injection, , , PRN, Jason Mauro, APRN CRNA, 10 mg at 01/27/14 0923    SOCIAL HISTORY:  Social History     Social History     Marital status: Single     Spouse name: Leonela     Number of children: 1     Years of education: N/A     Occupational History      Self Employed.     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       FAMILY HISTORY:  Family History   Problem Relation Age of Onset     Asthma Mother      Diabetes Mother      Hypertension Mother      Cerebrovascular Disease Mother      Cancer Mother      Alcohol/Drug Mother      HEART DISEASE Mother      Asthma Sister      Asthma Sister      Asthma Sister      Asthma Brother      Skin Cancer Brother      Asthma Brother      Asthma Brother      HEART DISEASE Sister      Diabetes Sister      Melanoma No family hx of        Past/family/social history reviewed and no changes    PHYSICAL EXAMINATION:  Constitutional: aaox3, cooperative, pleasant, not dyspneic/diaphoretic, no acute distress  Vitals reviewed: BP (!) 133/92  Pulse 71  Temp 97.8  F (36.6  C) (Oral)  Ht 1.727 m (5' 8\")  Wt 91.3 kg (201 lb 3.2 oz)  SpO2 97%  BMI 30.59 kg/m2  Wt:   Wt Readings from Last 2 Encounters:   10/26/18 91.3 kg (201 lb 3.2 oz)   08/06/18 101.8 kg (224 lb 6.4 oz)      Eyes: Sclera anicteric/injected  Ears/nose/mouth/throat: Normal oropharynx without ulcers or exudate, mucus membranes moist, hearing intact  Neck: supple, thyroid normal size  CV: No edema  Respiratory: Unlabored breathing  Lymph: No cervical lymphadenopathy  Abd: Nondistended, +bs, no hepatosplenomegaly, nontender, no peritoneal signs, +peritoneal dialysis catheter  Skin: warm, perfused, no jaundice  Psych: Normal affect  MSK: Normal gait    PERTINENT STUDIES:  Orders Only on 09/13/2018   Component Date Value Ref Range Status     ABO 09/13/2018 O   Final     RH(D) 09/13/2018 Pos   Final     Antibody Screen 09/13/2018 Neg   Final     Test Valid Only At 09/13/2018 [facility]      Final     Specimen Expires 09/13/2018 09/16/2018   Final     Antibody Titer 09/13/2018    Final                    Value:ANTI A1: IgG >256  ANTI B: IgG >256       ABO 09/13/2018 O   Final     RH(D) 09/13/2018 Pos   Final     Specimen Expires 09/13/2018 09/16/2018   Final     "

## 2018-10-26 NOTE — PATIENT INSTRUCTIONS
It was a pleasure taking care of you today.  I've included a brief summary of our discussion and care plan from today's visit below.  Please review this information with your primary care provider.  _______________________________________________________________________    My recommendations are summarized as follows:  1. Start polyethylene glycol (Miralax).  --    Return to GI Clinic in 3 months to review your progress.    _______________________________________________________________________    Who do I call with any questions after my visit?  Please be in touch if there are any further questions that arise following today's visit.  There are multiple ways to contact your gastroenterology care team.        During business hours, you may reach a Gastroenterology nurse at [phone] and choose option 3.         To schedule or reschedule an appointment, please call [phone].       You can always send a secure message through [email or web address].  [email or web address] messages are answered by your nurse or doctor typically within 24 hours.  Please allow extra time on weekends and holidays.        For urgent/emergent questions after business hours, you may reach the on-call GI Fellow by contacting the [facility]  at [phone].     How will I get the results of any tests ordered?    You will receive all of your results.  If you have signed up for [email or web address], any tests ordered at your visit will be available to you after your physician reviews them.  Typically this takes 1-2 weeks.  If there are urgent results that require a change in your care plan, your physician or nurse will call you to discuss the next steps.      What is [email or web address]?  [email or web address] is a secure way for you to access all of your healthcare records from the [facility].  It is a web based computer program, so you can sign on to it from any location.  It also allows you to send secure messages to your care team.  I recommend signing up for [employer]  access if you have not already done so and are comfortable with using a computer.      How to I schedule a follow-up visit?  If you did not schedule a follow-up visit today, please call [phone] to schedule a follow-up office visit.        Sincerely,    Wes Guerra MD  Fellow  [facility]  Division of Gastroenterology

## 2018-10-26 NOTE — PROGRESS NOTES
I performed a history and physical examination of the above patient and discussed the management with Dr. Guerra on 10/26/2018. I reviewed the note and there are no changes to the past medical, family or social history.  A complete 10 point review of systems was obtained. Please see the HPI for pertinent positives and negatives. All other systems were reviewed and were found to be negative. I agree with the documented findings and plan of care as outlined.    Alden Holguin MD  GI Attending  Pager: [phone]

## 2018-10-26 NOTE — MR AVS SNAPSHOT
After Visit Summary   10/26/2018    Rommel Fan    MRN: [id number]           Patient Information     Date Of Birth          1960        Visit Information        Provider Department      10/26/2018 11:20 AM Wes Guerra [facility]        Today's Diagnoses     Constipation, unspecified constipation type    -  1    Diarrhea, unspecified type          Care Instructions    It was a pleasure taking care of you today.  I've included a brief summary of our discussion and care plan from today's visit below.  Please review this information with your primary care provider.  _______________________________________________________________________    My recommendations are summarized as follows:  1. Start polyethylene glycol (Miralax).  --    Return to GI Clinic in 3 months to review your progress.    _______________________________________________________________________    Who do I call with any questions after my visit?  Please be in touch if there are any further questions that arise following today's visit.  There are multiple ways to contact your gastroenterology care team.        During business hours, you may reach a Gastroenterology nurse at [phone] and choose option 3.         To schedule or reschedule an appointment, please call [phone].       You can always send a secure message through [employer].  [employer] messages are answered by your nurse or doctor typically within 24 hours.  Please allow extra time on weekends and holidays.        For urgent/emergent questions after business hours, you may reach the on-call GI Fellow by contacting the [facility] at [phone].     How will I get the results of any tests ordered?    You will receive all of your results.  If you have signed up for [employer], any tests ordered at your visit will be available to you after your physician reviews them.  Typically this takes 1-2 weeks.  If there are  urgent results that require a change in your care plan, your physician or nurse will call you to discuss the next steps.      What is [employer]?  [employer] is a secure way for you to access all of your healthcare records from the [facility].  It is a web based computer program, so you can sign on to it from any location.  It also allows you to send secure messages to your care team.  I recommend signing up for [employer] access if you have not already done so and are comfortable with using a computer.      How to I schedule a follow-up visit?  If you did not schedule a follow-up visit today, please call [phone] to schedule a follow-up office visit.        Sincerely,    Wes Guerra MD  Fellow  [facility]  Division of Gastroenterology          Follow-ups after your visit        Your next 10 appointments already scheduled     Nov 15, 2018  5:00 PM CST   (Arrive by 4:45 PM)   Return Visit with Cody Lloyd MD   [facility] ([facility])    [address]  [address] [place] [address] [phone]            Nov 26, 2018 12:40 PM CST   (Arrive by 12:25 PM)   RETURN FOOT/ANKLE with Ranjan Boo DPM   [facility] ([facility])    [address]  4th Floor  [place] [address] [phone]              Who to contact     Please call your clinic at [phone] to:    Ask questions about your health    Make or cancel appointments    Discuss your medicines    Learn about your test results    Speak to your doctor            Additional Information About Your Visit        MyChart Information     [employer] gives you secure access to your electronic health record. If you see a primary care provider, you can also send messages to your care team and make appointments. If you have questions, please call your primary care clinic.  If you do not have a primary care provider, please call [phone] and  "they will assist you.      [employer] is an electronic gateway that provides easy, online access to your medical records. With [employer], you can request a clinic appointment, read your test results, renew a prescription or communicate with your care team.     To access your existing account, please contact your [facility] or call [phone] for assistance.        Care EveryWhere ID     This is your Care EveryWhere ID. This could be used by other organizations to access your [place] medical records  [id number]        Your Vitals Were     Pulse Temperature Height Pulse Oximetry BMI (Body Mass Index)       71 97.8  F (36.6  C) (Oral) 1.727 m (5' 8\") 97% 30.59 kg/m2        Blood Pressure from Last 3 Encounters:   10/26/18 (!) 133/92   08/06/18 115/66   07/06/18 145/88    Weight from Last 3 Encounters:   10/26/18 91.3 kg (201 lb 3.2 oz)   08/06/18 101.8 kg (224 lb 6.4 oz)   07/06/18 100.5 kg (221 lb 8 oz)              Today, you had the following     No orders found for display         Today's Medication Changes          These changes are accurate as of 10/26/18 11:59 PM.  If you have any questions, ask your nurse or doctor.               Start taking these medicines.        Dose/Directions    polyethylene glycol powder   Commonly known as:  MIRALAX   Used for:  Constipation, unspecified constipation type   Started by:  Wes Guerra        Dose:  1 capful   Take 17 g (1 capful) by mouth 2 times daily   Quantity:  510 g   Refills:  3         Stop taking these medicines if you haven't already. Please contact your care team if you have questions.     loperamide 2 MG tablet   Commonly known as:  IMODIUM A-D   Stopped by:  Wes Guerra                Where to get your medicines      These medications were sent to [facility] [address] [address]     Phone:  [phone]     polyethylene glycol powder    psyllium 0.52 g capsule       "          Primary Care Provider Office Phone # Fax #    Chris Barraza [phone]       [facility] [address]  [place] [facility] [address]        Equal Access to Services     SONYA BONILLA : Ana Cristina sun jovanyo Sobriaali, waaxda luqadaha, qaybta kaalmada adeegyada, elpidio huff laRubinaceleste mendez. So [facility] [phone].    ATENCIÓN: Si habla español, tiene a das disposición servicios gratuitos de asistencia lingüística. Llame al [phone].    We comply with applicable federal civil rights laws and [place] laws. We do not discriminate on the basis of race, color, national origin, age, disability, sex, sexual orientation, or gender identity.            Thank you!     Thank you for choosing [facility]  for your care. Our goal is always to provide you with excellent care. Hearing back from our patients is one way we can continue to improve our services. Please take a few minutes to complete the written survey that you may receive in the mail after your visit with us. Thank you!             Your Updated Medication List - Protect others around you: Learn how to safely use, store and throw away your medicines at www.disposemymeds.org.          This list is accurate as of 10/26/18 11:59 PM.  Always use your most recent med list.                   Brand Name Dispense Instructions for use Diagnosis    * albuterol 108 (90 Base) MCG/ACT inhaler    PROAIR HFA/PROVENTIL HFA/VENTOLIN HFA     Inhale 2 puffs into the lungs every 6 hours as needed        * albuterol (2.5 MG/3ML) 0.083% neb solution           ALLOPURINOL PO      Take 200 mg by mouth daily        alpha-d-galactosidase tablet      Take 1 tablet by mouth as needed for intestinal gas        amLODIPine 10 MG tablet    NORVASC     Take 1 tablet by mouth daily.        aspirin 81 MG EC tablet     90 tablet    Take 1 tablet (81 mg) by mouth daily . Please be seen in clinic with labs for further  refills.    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 renal disorders, Cough, Shortness of breath       CHANTIX PO      Take 1 mg by mouth 2 times daily as needed        clonazePAM 0.5 MG tablet    klonoPIN          COLCRYS PO      Take 0.6 mg by mouth 2 times daily        cyclobenzaprine 10 MG tablet    FLEXERIL          DIALYVITE PO      Take by mouth daily        dianeal PD-2.5% dex (calcium 3.5 mEq/L) 396 MOSM/L Soln CAPD     1000 mL    1,000 mLs by Dialysis route once for 1 dose    PD catheter dysfunction, initial encounter (H), ESRD (end stage renal disease) (H)       FUROSEMIDE PO      Take 80 mg by mouth daily        GABAPENTIN PO      Take 100 mg by mouth At Bedtime        gentamicin 0.1 % cream    GARAMYCIN          gentian violet 2 % solution     59 mL    Pain every evening around orifice of peritoneal dialysis to dry and disinfect    Pyogenic granuloma       hydrALAZINE 100 MG Tabs tablet    APRESOLINE    90 tablet    Take 1 tablet by mouth 3 times daily.    Hypertension       isosorbide mononitrate 60 MG 24 hr tablet    IMDUR    180 tablet    Take 1 tablet (60 mg) by mouth 2 times daily DO NOT TAKE WITH SILDENAFIL (viagra). Please follow up in clinic as scheduled with labs prior.    HTN (hypertension)       * ketoconazole 2 % shampoo    NIZORAL    120 mL    Please use every other day to scalp and affected areas of face    Dermatitis, seborrheic       * ketoconazole 2 % cream    NIZORAL    60 g    Please use twice daily for 3 months    Dermatitis, seborrheic       * ketoconazole  2 % cream    NIZORAL    60 g    Apply topically daily 1-2 times daily on face    Dermatitis, seborrheic       * ketoconazole 2 % shampoo    NIZORAL    120 mL    Apply topically three times a week    Dermatitis, seborrheic       lidocaine 5 % ointment    XYLOCAINE          Lidocaine HCl 2 % Crea     60 mL    Externally apply 1 dose topically daily    Pain in limb, Plantar fascial fibromatosis       losartan 100 MG tablet    COZAAR    30 tablet    Take 1 tablet by mouth daily.    Hypertension       MAGNESIUM OXIDE PO      Take 400 mg by mouth daily        MAVYRET 100-40 MG per tablet   Generic drug:  glecaprevir-pibrentasvir           MINOXIDIL PO      Take 2.5 mg by mouth daily        mupirocin 2 % ointment    BACTROBAN          niacin 500 MG CR tablet    SLO-NIACIN     Take 1 tablet (500 mg) by mouth At Bedtime    Hypertriglyceridemia       niacin 500 MG CR tablet    NIASPAN          omeprazole 40 MG capsule    priLOSEC     Take 1 capsule by mouth as needed        ONDANSETRON PO      Take 4 mg by mouth 3 times daily        OTHER MEDICAL SUPPLIES      CPAP therapy        PENLAC 8 % Soln   Generic drug:  ciclopirox      Apply to adjacent skin and affected nails daily.  Remove with alcohol every 7 days, then repeat.        polyethylene glycol powder    MIRALAX    510 g    Take 17 g (1 capful) by mouth 2 times daily    Constipation, unspecified constipation type       psyllium 0.52 g capsule     540 capsule    Take 1 capsule (0.52 g) by mouth daily    Diarrhea, unspecified type       quiNINE 324 MG capsule     100 capsule    Take 1 capsule (324 mg) by mouth nightly as needed for moderate pain    Chronic hepatitis C without hepatic coma (H)       senna-docusate 8.6-50 MG per tablet    YASMINE-COLACE    50 tablet    Take 1-2 tablets by mouth 2 times daily as needed for constipation    Peritoneal dialysis status (H)       sildenafil 50 MG tablet    VIAGRA     Take 0.5 tablets by mouth        SYMBICORT IN      Inhale 2 puffs  into the lungs 2 times daily        TRAZODONE HCL PO      Take 100 mg by mouth daily        * triamcinolone 0.025 % cream    KENALOG    80 g    Please use to affected areas of legs 3-4 days, once weekly to affected areas of the face    Xerosis of skin, Nummular eczema       * triamcinolone 0.025 % cream    KENALOG    80 g    Put on scaly lesions around nose and behind ears on Sunday and maybe Saturday evening (not more than 2 times weekly)    Dermatitis, seborrheic       * UNABLE TO FIND      1 tsp. MEDICATION NAME: calcium        * UNABLE TO FIND      MEDICATION NAME: Centamicin Cream prn        UNABLE TO FIND      MEDICATION NAME: Liquid Calcium 1 teaspoon w/ and between meals        venlafaxine 37.5 MG 24 hr capsule    EFFEXOR-XR          VITAMIN D3 PO      Take 2,000 Units by mouth daily        ZETIA PO      Take 10 mg by mouth        * Notice:  This list has 12 medication(s) that are the same as other medications prescribed for you. Read the directions carefully, and ask your doctor or other care provider to review them with you.
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
## 2018-11-15 PROBLEM — Z01.818 PRE-OPERATIVE CLEARANCE: Status: ACTIVE | Noted: 2018-01-01

## 2018-11-15 NOTE — NURSING NOTE
Chief Complaint   Patient presents with     Follow Up For     1 Yr F/U     Medications reviewed and vitals performed.  Doris Gonzalez CMA

## 2018-11-15 NOTE — MR AVS SNAPSHOT
After Visit Summary   11/15/2018    Rommel Fan    MRN: [id number]           Patient Information     Date Of Birth          1960        Visit Information        Provider Department      11/15/2018 5:00 PM Cody Lloyd MD [facility]        Today's Diagnoses     Pre-operative clearance    -  1    Hepatitis C virus carrier state (H)        Cardiovascular disease        End stage renal disease (H)        History of tobacco use        Organ transplant candidate        Obstructive sleep apnea        ED (erectile dysfunction)          Care Instructions    Patient Instructions:  It was a pleasure to see you in the cardiology clinic today.      If you have any questions, you can reach my nurse, Joseline Rivas LPN, at [phone].  Press Option #1 for the [facility], and then press Option #3 for nursing.    We are encouraging the use of Applifier to communicate with your HealthCare Provider    Medication Changes: None.    Studies Ordered: Echo Dobutamine Stress Test at your convenience.  Scheduling can be completed at [phone].    INSTRUCTIONS FOR DOBUTAMINE STRESS ECHO:    Nothing to eat or drink for 3 hours before test except for water.   No caffeine, tobacco, or alcohol for 12 hrs before test.   Stop your beta blocker two days before the test.  (Carvedilol)    The results from today include: Labs.    Please follow up: With Dr. Lloyd in one year with fasting labs prior.    Sincerely,    Cody Lloyd MD     If you have an urgent need after hours (8:00 am to 4:30 pm) please call [phone] and ask for the cardiology fellow on call.                    Follow-ups after your visit        Additional Services     Follow-Up with Cardiologist                 Your next 10 appointments already scheduled     Nov 26, 2018 12:40 PM CST   (Arrive by 12:25 PM)   RETURN FOOT/ANKLE with Ranjan Boo DPM   [facility] ([facility]  "[place])    [address] [id number] [phone]              Future tests that were ordered for you today     Open Future Orders        Priority Expected Expires Ordered    Follow-Up with Cardiologist Routine 11/15/2019 11/16/2019 11/15/2018    Comprehensive metabolic panel Routine 11/15/2019 11/16/2019 11/15/2018    Lipid panel reflex to direct LDL Fasting Routine 11/15/2019 11/16/2019 11/15/2018    Dobutamine Stress Echocardiogram Routine 11/15/2019 11/16/2019 11/15/2018            Who to contact     If you have questions or need follow up information about today's clinic visit or your schedule please contact [facility] directly at [phone].  Normal or non-critical lab and imaging results will be communicated to you by ICRTechart, letter or phone within 4 business days after the clinic has received the results. If you do not hear from us within 7 days, please contact the clinic through FabAlleyt or phone. If you have a critical or abnormal lab result, we will notify you by phone as soon as possible.  Submit refill requests through Daily Secret or call your pharmacy and they will forward the refill request to us. Please allow 3 business days for your refill to be completed.          Additional Information About Your Visit        Daily Secret Information     Daily Secret gives you secure access to your electronic health record. If you see a primary care provider, you can also send messages to your care team and make appointments. If you have questions, please call your primary care clinic.  If you do not have a primary care provider, please call [phone] and they will assist you.        Care EveryWhere ID     This is your Care EveryWhere ID. This could be used by other organizations to access your [place] medical records  [id number]        Your Vitals Were     Pulse Height Pulse Oximetry BMI (Body Mass Index)          77 1.727 m (5' 8\") 98% 31.02 kg/m2         Blood Pressure from " Last 3 Encounters:   11/15/18 120/79   10/26/18 (!) 133/92   08/06/18 115/66    Weight from Last 3 Encounters:   11/15/18 92.5 kg (204 lb)   10/26/18 91.3 kg (201 lb 3.2 oz)   08/06/18 101.8 kg (224 lb 6.4 oz)                 Today's Medication Changes          These changes are accurate as of 11/15/18  6:10 PM.  If you have any questions, ask your nurse or doctor.               These medicines have changed or have updated prescriptions.        Dose/Directions    sildenafil 50 MG tablet   Commonly known as:  VIAGRA   This may have changed:    - when to take this  - reasons to take this   Used for:  ED (erectile dysfunction)   Changed by:  Cody Lloyd MD        Dose:  25 mg   Take 0.5 tablets (25 mg) by mouth daily as needed (prior to sexual intercourse.)   Quantity:  30 tablet   Refills:  0            Where to get your medicines      These medications were sent to [address] [address]     Phone:  [phone]     sildenafil 50 MG tablet                Primary Care Provider Office Phone # Fax #    Chris Barraza [phone]       [facility] [address]  [facility] [address]        Equal Access to Services     SONYA BONILLA AH: Hadgreg sun hadasho Sonicole, waaxda luqadaha, qaybta kaalmada adeegyada, elpidio mancini . So [facility] [phone].    ATENCIÓN: Si habla español, tiene a das disposición servicios gratuitos de asistencia lingüística. Llame al [phone].    We comply with applicable federal civil rights laws and [place] laws. We do not discriminate on the basis of race, color, national origin, age, disability, sex, sexual orientation, or gender identity.            Thank you!     Thank you for choosing [facility]  for your care. Our goal is always to provide you with excellent care. Hearing back from our patients is one way we can continue to improve our services.  Please take a few minutes to complete the written survey that you may receive in the mail after your visit with us. Thank you!             Your Updated Medication List - Protect others around you: Learn how to safely use, store and throw away your medicines at www.disposemymeds.org.          This list is accurate as of 11/15/18  6:10 PM.  Always use your most recent med list.                   Brand Name Dispense Instructions for use Diagnosis    * albuterol 108 (90 Base) MCG/ACT inhaler    PROAIR HFA/PROVENTIL HFA/VENTOLIN HFA     Inhale 2 puffs into the lungs every 6 hours as needed        * albuterol (2.5 MG/3ML) 0.083% neb solution           ALLOPURINOL PO      Take 200 mg by mouth daily        alpha-d-galactosidase tablet      Take 1 tablet by mouth as needed for intestinal gas        amLODIPine 10 MG tablet    NORVASC     Take 1 tablet by mouth daily.        aspirin 81 MG EC tablet     90 tablet    Take 1 tablet (81 mg) by mouth daily . Please be seen in clinic with labs for further refills.    Bilateral claudication of lower limb (H), Hypertension secondary to other renal disorders       WAYLON CONTOUR test strip   Generic drug:  blood glucose monitoring           blood glucose monitoring lancets           calcitRIOL 0.25 MCG capsule    ROCALTROL          * CALCIUM ANTACID PO      Take 1,000 mg by mouth        * CALCIUM ANTACID ULTRA 1000 MG Chew   Generic drug:  calcium carbonate antacid           carvedilol 12.5 MG tablet    COREG    180 tablet    Take 25 mg by mouth daily    Hypertension secondary to other renal disorders, Cough, Shortness of breath       CHANTIX PO      Take 1 mg by mouth 2 times daily as needed        clonazePAM 0.5 MG tablet    klonoPIN          COLCRYS PO      Take 0.6 mg by mouth 2 times daily        cyclobenzaprine 10 MG tablet    FLEXERIL          DIALYVITE PO      Take by mouth daily        dianeal PD-2.5% dex (calcium 3.5 mEq/L) 396 MOSM/L Soln CAPD     1000 mL    1,000 mLs by  Dialysis route once for 1 dose    PD catheter dysfunction, initial encounter (H), ESRD (end stage renal disease) (H)       FUROSEMIDE PO      Take 80 mg by mouth daily        GABAPENTIN PO      Take 100 mg by mouth At Bedtime        gentamicin 0.1 % cream    GARAMYCIN          gentian violet 2 % solution     59 mL    Pain every evening around orifice of peritoneal dialysis to dry and disinfect    Pyogenic granuloma       hydrALAZINE 100 MG Tabs tablet    APRESOLINE    90 tablet    Take 1 tablet by mouth 3 times daily.    Hypertension       isosorbide mononitrate 60 MG 24 hr tablet    IMDUR    180 tablet    Take 1 tablet (60 mg) by mouth 2 times daily DO NOT TAKE WITH SILDENAFIL (viagra). Please follow up in clinic as scheduled with labs prior.    HTN (hypertension)       * ketoconazole 2 % shampoo    NIZORAL    120 mL    Please use every other day to scalp and affected areas of face    Dermatitis, seborrheic       * ketoconazole 2 % cream    NIZORAL    60 g    Please use twice daily for 3 months    Dermatitis, seborrheic       * ketoconazole 2 % cream    NIZORAL    60 g    Apply topically daily 1-2 times daily on face    Dermatitis, seborrheic       * ketoconazole 2 % shampoo    NIZORAL    120 mL    Apply topically three times a week    Dermatitis, seborrheic       lidocaine 5 % ointment    XYLOCAINE          Lidocaine HCl 2 % Crea     60 mL    Externally apply 1 dose topically daily    Pain in limb, Plantar fascial fibromatosis       losartan 100 MG tablet    COZAAR    30 tablet    Take 1 tablet by mouth daily.    Hypertension       MAGNESIUM OXIDE PO      Take 400 mg by mouth daily        MAVYRET 100-40 MG per tablet   Generic drug:  glecaprevir-pibrentasvir           MINOXIDIL PO      Take 2.5 mg by mouth daily        mupirocin 2 % ointment    BACTROBAN          niacin 500 MG CR tablet    SLO-NIACIN     Take 1 tablet (500 mg) by mouth At Bedtime    Hypertriglyceridemia       niacin 500 MG CR tablet    NIASPAN           omeprazole 40 MG capsule    priLOSEC     Take 1 capsule by mouth as needed        ONDANSETRON PO      Take 4 mg by mouth 3 times daily        OTHER MEDICAL SUPPLIES      CPAP therapy        PENLAC 8 % Soln   Generic drug:  ciclopirox      Apply to adjacent skin and affected nails daily.  Remove with alcohol every 7 days, then repeat.        polyethylene glycol powder    MIRALAX    510 g    Take 17 g (1 capful) by mouth 2 times daily    Constipation, unspecified constipation type       psyllium 0.52 g capsule     540 capsule    Take 1 capsule (0.52 g) by mouth daily    Diarrhea, unspecified type       quiNINE 324 MG capsule     100 capsule    Take 1 capsule (324 mg) by mouth nightly as needed for moderate pain    Chronic hepatitis C without hepatic coma (H)       senna-docusate 8.6-50 MG per tablet    YASMINE-COLACE    50 tablet    Take 1-2 tablets by mouth 2 times daily as needed for constipation    Peritoneal dialysis status (H)       sildenafil 50 MG tablet    VIAGRA    30 tablet    Take 0.5 tablets (25 mg) by mouth daily as needed (prior to sexual intercourse.)    ED (erectile dysfunction)       SYMBICORT IN      Inhale 2 puffs into the lungs 2 times daily        TRAZODONE HCL PO      Take 100 mg by mouth daily        * triamcinolone 0.025 % cream    KENALOG    80 g    Please use to affected areas of legs 3-4 days, once weekly to affected areas of the face    Xerosis of skin, Nummular eczema       * triamcinolone 0.025 % cream    KENALOG    80 g    Put on scaly lesions around nose and behind ears on Sunday and maybe Saturday evening (not more than 2 times weekly)    Dermatitis, seborrheic       * UNABLE TO FIND      1 tsp. MEDICATION NAME: calcium        * UNABLE TO FIND      MEDICATION NAME: Centamicin Cream prn        UNABLE TO FIND      MEDICATION NAME: Liquid Calcium 1 teaspoon w/ and between meals        venlafaxine 37.5 MG 24 hr capsule    EFFEXOR-XR          VITAMIN D3 PO      Take 2,000 Units by mouth  daily        ZETIA PO      Take 10 mg by mouth        * Notice:  This list has 12 medication(s) that are the same as other medications prescribed for you. Read the directions carefully, and ask your doctor or other care provider to review them with you.

## 2018-11-15 NOTE — PROGRESS NOTES
HPI:     Mr DAMON Fan is a 57 yo M with history including hypertension and dyslipidemia/hypertriglyceridemia, ESRD and nw doing home peritoneal dialysis presenting for f/u.    He is here for clearance prior to being active on the renal tx list. Denies and CP/SOB at rest or with exertion. No lightheadedness, dizziness, palpitations.    PAST MEDICAL HISTORY:  Past Medical History:   Diagnosis Date     Asthma 1979     COPD (chronic obstructive pulmonary disease) (H) 1988     Depressive disorder     currently on Effexor     End stage renal failure on dialysis (H)     Started dialysis 4/29/16     Gastrointestinal problem      Hepatitis C 2018    Treated this 2018     Hyperlipidemia LDL goal <100 4/3/2013     Hypertension      Hypertriglyceridemia 7/13/2015     Obstructive sleep apnea      Sinus problem        CURRENT MEDICATIONS:  Current Outpatient Prescriptions   Medication Sig Dispense Refill     albuterol (2.5 MG/3ML) 0.083% neb solution        albuterol (PROAIR HFA, PROVENTIL HFA, VENTOLIN HFA) 108 (90 BASE) MCG/ACT inhaler Inhale 2 puffs into the lungs every 6 hours as needed        ALLOPURINOL PO Take 200 mg by mouth daily        alpha-d-galactosidase (BEANO) tablet Take 1 tablet by mouth as needed for intestinal gas       amLODIPine (NORVASC) 10 MG tablet Take 1 tablet by mouth daily.       aspirin 81 MG EC tablet Take 1 tablet (81 mg) by mouth daily . Please be seen in clinic with labs for further refills. 90 tablet 0     B Complex-C-Folic Acid (DIALYVITE PO) Take by mouth daily       WAYLON CONTOUR test strip        blood glucose monitoring (PRODIGY TWIST TOP 28G) lancets        Budesonide-Formoterol Fumarate (SYMBICORT IN) Inhale 2 puffs into the lungs 2 times daily        calcitRIOL (ROCALTROL) 0.25 MCG capsule        CALCIUM ANTACID ULTRA 1000 MG CHEW        Calcium Carbonate Antacid (CALCIUM ANTACID PO) Take 1,000 mg by mouth       carvedilol (COREG) 12.5 MG tablet Take 25 mg by mouth daily  180 tablet 3      Cholecalciferol (VITAMIN D3 PO) Take 2,000 Units by mouth daily        ciclopirox (PENLAC) 8 % SOLN Apply to adjacent skin and affected nails daily.  Remove with alcohol every 7 days, then repeat.       clonazePAM (KLONOPIN) 0.5 MG tablet        Colchicine (COLCRYS PO) Take 0.6 mg by mouth 2 times daily       cyclobenzaprine (FLEXERIL) 10 MG tablet        Ezetimibe (ZETIA PO) Take 10 mg by mouth       FUROSEMIDE PO Take 80 mg by mouth daily       GABAPENTIN PO Take 100 mg by mouth At Bedtime       gentamicin (GARAMYCIN) 0.1 % cream        gentian violet 2 % solution Pain every evening around orifice of peritoneal dialysis to dry and disinfect 59 mL 3     hydrALAZINE (APRESOLINE) 100 MG TABS Take 1 tablet by mouth 3 times daily. 90 tablet 2     isosorbide mononitrate (IMDUR) 60 MG 24 hr tablet Take 1 tablet (60 mg) by mouth 2 times daily DO NOT TAKE WITH SILDENAFIL (viagra). Please follow up in clinic as scheduled with labs prior. 180 tablet 0     ketoconazole (NIZORAL) 2 % cream Apply topically daily 1-2 times daily on face 60 g 11     ketoconazole (NIZORAL) 2 % cream Please use twice daily for 3 months 60 g 11     ketoconazole (NIZORAL) 2 % shampoo Apply topically three times a week 120 mL 11     ketoconazole (NIZORAL) 2 % shampoo Please use every other day to scalp and affected areas of face 120 mL 3     lidocaine (XYLOCAINE) 5 % ointment        Lidocaine HCl 2 % CREA Externally apply 1 dose topically daily 60 mL 0     losartan (COZAAR) 100 MG tablet Take 1 tablet by mouth daily. 30 tablet 2     MAGNESIUM OXIDE PO Take 400 mg by mouth daily       MAVYRET 100-40 MG TABS        MINOXIDIL PO Take 2.5 mg by mouth daily       mupirocin (BACTROBAN) 2 % ointment        niacin (NIASPAN) 500 MG CR tablet        niacin (SLO-NIACIN) 500 MG CR tablet Take 1 tablet (500 mg) by mouth At Bedtime  3     omeprazole (PRILOSEC) 40 MG capsule Take 1 capsule by mouth as needed        ONDANSETRON PO Take 4 mg by mouth 3 times daily        OTHER MEDICAL SUPPLIES CPAP therapy       polyethylene glycol (MIRALAX) powder Take 17 g (1 capful) by mouth 2 times daily 510 g 3     psyllium 0.52 g capsule Take 1 capsule (0.52 g) by mouth daily 540 capsule 1     quiNINE 324 MG capsule Take 1 capsule (324 mg) by mouth nightly as needed for moderate pain 100 capsule 3     senna-docusate (YASMINE-COLACE) 8.6-50 MG per tablet Take 1-2 tablets by mouth 2 times daily as needed for constipation 50 tablet 0     sildenafil (VIAGRA) 50 MG tablet Take 0.5 tablets by mouth       TRAZODONE HCL PO Take 100 mg by mouth daily       triamcinolone (KENALOG) 0.025 % cream Put on scaly lesions around nose and behind ears on Sunday and maybe Saturday evening (not more than 2 times weekly) 80 g 1     triamcinolone (KENALOG) 0.025 % cream Please use to affected areas of legs 3-4 days, once weekly to affected areas of the face 80 g 0     UNABLE TO FIND MEDICATION NAME: Liquid Calcium 1 teaspoon w/ and between meals       UNABLE TO FIND 1 tsp. MEDICATION NAME: calcium       UNABLE TO FIND MEDICATION NAME: Centamicin Cream prn       Varenicline Tartrate (CHANTIX PO) Take 1 mg by mouth 2 times daily as needed        venlafaxine (EFFEXOR-XR) 37.5 MG 24 hr capsule        Per[facility] [employer] (DIANEAL PD-2.5%, CALCIUM 3.5 MEQ/L,) 396 MOSM/L SOLN 1,000 mLs by Dialysis route once for 1 dose 1000 mL 0       PAST SURGICAL HISTORY:  Past Surgical History:   Procedure Laterality Date     BIOPSY      Kidney @ [facility]     CHOLECYSTECTOMY  2013     COLONOSCOPY  2016    [place]     LAPAROSCOPIC INSERTION CATHETER PERITONEAL DIALYSIS N/A 3/16/2016    Procedure: LAPAROSCOPIC INSERTION CATHETER PERITONEAL DIALYSIS;  Surgeon: Winnie Colmenares MD;  Location: [facility] OR     SEPTOPLASTY, TURBINOPLASTY, COMBINED  Nov 2008    inferior turbinate reduction and septoplasty due to deviation to right     TURBINOPLASTY  July 2008    bilateral inferior turbinate reduction     TURBINOPLASTY  1/27/2014     "Procedure: TURBINOPLASTY;  Bilateral Turbinate Reduction ;  Surgeon: Cindy Carrasquillo MD;  Location: [facility] OR       ALLERGIES   No Known Allergies    FAMILY HISTORY:  Family History   Problem Relation Age of Onset     Asthma Mother      Diabetes Mother      Hypertension Mother      Cerebrovascular Disease Mother      Cancer Mother      Alcohol/Drug Mother      HEART DISEASE Mother      Asthma Sister      Asthma Sister      Asthma Sister      Asthma Brother      Skin Cancer Brother      Asthma Brother      Asthma Brother      HEART DISEASE Sister      Diabetes Sister      Melanoma No family hx of        SOCIAL HISTORY:  Social History     Social History     Marital status: Single     Spouse name: Leonela     Number of children: 1     Years of education: N/A     Occupational History      Self Employed.     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 No weight gain/loss   ENT: No visual disturbance, ear ache, epistaxis, sore throat  Allergies/Immunologic: Negative.   Respiratory: No cough, hemoptysia  Cardiovascular: As per HPI  GI: No nausea, vomiting, hematemesis, melena, or hematochezia  : No urinary frequency, dysuria, or hematuria  Integument: Negative  Psychiatric: Negative  Neuro: Negative  Endocrinology: Negative   Musculoskeletal: Negative    EXAM:  /79 (BP Location: Left arm, Patient Position: Chair, Cuff Size: Adult Regular)  Pulse 77  Ht 1.727 m (5' 8\")  Wt 92.5 kg (204 lb)  SpO2 98%  BMI 31.02 kg/m2  In general, the patient is a pleasant male in no apparent distress.  "   HEENT: NC/AT.  PERRLA.  EOMI.  Sclerae white, not injected.  Nares clear.  Pharynx without erythema or exudate.  Dentition intact.    Neck: No adenopathy.  No thyromegaly. Carotids +4/4 bilaterally without bruits.  No jugular venous distension.   Heart: RRR. Normal S1, S2 splits physiologically. No murmur, rub, click, or gallop. The PMI is in the 5th ICS in the midclavicular line. There is no heave.    Lungs: CTA.  No ronchi, wheezes, rales.  No dullness to percussion.   Abdomen: Soft, nontender, nondistended. No organomegaly.  No bruits.   Extremities: No clubbing, cyanosis, or edema.  The pulses are +4/4 at the radial, brachial, femoral, popliteal, DP, and PT sites bilaterally.  No bruits are noted.  Neurologic: Alert and oriented to person/place/time, normal speech, gait and affect  Skin: No petechiae, purpura or rash.    Labs:  LIPID RESULTS:  Lab Results   Component Value Date    CHOL 123 08/06/2018    HDL 33 (L) 08/06/2018    LDL 70 08/06/2018    TRIG 103 08/06/2018    CHOLHDLRATIO 8.0 (H) 06/29/2015    NHDL 90 08/06/2018       LIVER ENZYME RESULTS:  Lab Results   Component Value Date    AST 6 08/06/2018    ALT 14 08/06/2018       CBC RESULTS:  Lab Results   Component Value Date    WBC 7.0 08/06/2018    RBC 3.12 (L) 08/06/2018    HGB 9.8 (L) 08/06/2018    HCT 31.7 (L) 08/06/2018     (H) 08/06/2018    MCH 31.4 08/06/2018    MCHC 30.9 (L) 08/06/2018    RDW 16.0 (H) 08/06/2018     08/06/2018       BMP RESULTS:  Lab Results   Component Value Date     08/06/2018    POTASSIUM 4.3 08/06/2018    CHLORIDE 103 08/06/2018    CO2 22 08/06/2018    ANIONGAP 17 (H) 08/06/2018     (H) 08/06/2018    BUN 74 (H) 08/06/2018    CR 22.20 (H) 08/06/2018    GFRESTIMATED 2 (L) 08/06/2018    GFRESTBLACK 3 (L) 08/06/2018    BETH 7.7 (L) 08/06/2018        A1C RESULTS:  Lab Results   Component Value Date    A1C 5.7 01/06/2016       INR RESULTS:  Lab Results   Component Value Date    INR 1.06 08/06/2018    INR  0.96 04/30/2018           Assessment and Plan:     Mr DAMON Fan is a 55 yo M with history including hypertension and dyslipidemia/hypertriglyceridemia, ESRD and nw doing home peritoneal dialysis presenting for f/u.  We discussed with patient the results  - we discussed in length the importance of a heart healthy lifestyle and diet and especially quit smokng                                                                                                                                                                                                                                                                       Impression is that patient is asymptomatic for any CAD, but last cardiac stress was in 1/2016, so will re-do cardiac stress with dobutamine stress echo.  -Dobutmine stress echo  Once we have the dobutamine stress echo results - we will clear patient for a kidney TX if there  aree no contra-indications.    #HLD:  -Continue niacin, ezetimibe    #HTN:  BP at goal.  -Continue amlodipine 10 mg PO every day  -Continue losartan 100 mg PO every day  -Continue hydralazine 100 mg PO TID  -Continue coreg 12.5 mg PO BID  -Continue Imdur 60 mg PO QD      Cody Lloyd MD, PhD  Professor of Medicine  Division of Cardiology    CC  Patient Care Team:  Chris Barraza as PCP - General (Internal Medicine)  Laurel Mckinnon RN as Nurse Coordinator (Cardiology)  Cody Lloyd MD as MD (Cardiology)  Cody Venegas MD as MD (Dermapathology)  Ranjan Boo DPM (Podiatry)  Laurel Mckinnon RN as Registered Nurse  Jaxon Watson MD as MD (Dermatology)  Dayana Castorena MD as MD (Nephrology)  SELF, REFERRED

## 2018-11-15 NOTE — LETTER
11/15/2018      RE: Rommel Fan  [address] [place] [address]       Dear Colleague,    Thank you for the opportunity to participate in the care of your patient, Rommel Fan, at the [facility] at [facility]. Please see a copy of my visit note below.    HPI:     Mr DAMON Fan is a 57 yo M with history including hypertension and dyslipidemia/hypertriglyceridemia, ESRD and nw doing home peritoneal dialysis presenting for f/u.    He is here for clearance prior to being active on the renal tx list. Denies and CP/SOB at rest or with exertion. No lightheadedness, dizziness, palpitations.    PAST MEDICAL HISTORY:  Past Medical History:   Diagnosis Date     Asthma 1979     COPD (chronic obstructive pulmonary disease) (H) 1988     Depressive disorder     currently on Effexor     End stage renal failure on dialysis (H)     Started dialysis 4/29/16     Gastrointestinal problem      Hepatitis C 2018    Treated this 2018     Hyperlipidemia LDL goal <100 4/3/2013     Hypertension      Hypertriglyceridemia 7/13/2015     Obstructive sleep apnea      Sinus problem        CURRENT MEDICATIONS:  Current Outpatient Prescriptions   Medication Sig Dispense Refill     albuterol (2.5 MG/3ML) 0.083% neb solution        albuterol (PROAIR HFA, PROVENTIL HFA, VENTOLIN HFA) 108 (90 BASE) MCG/ACT inhaler Inhale 2 puffs into the lungs every 6 hours as needed        ALLOPURINOL PO Take 200 mg by mouth daily        alpha-d-galactosidase (BEANO) tablet Take 1 tablet by mouth as needed for intestinal gas       amLODIPine (NORVASC) 10 MG tablet Take 1 tablet by mouth daily.       aspirin 81 MG EC tablet Take 1 tablet (81 mg) by mouth daily . Please be seen in clinic with labs for further refills. 90 tablet 0     B Complex-C-Folic Acid (DIALYVITE PO) Take by mouth daily       WAYLON CONTOUR test strip        blood glucose monitoring (PRODIGY TWIST TOP 28G) lancets        Budesonide-Formoterol  Fumarate (SYMBICORT IN) Inhale 2 puffs into the lungs 2 times daily        calcitRIOL (ROCALTROL) 0.25 MCG capsule        CALCIUM ANTACID ULTRA 1000 MG CHEW        Calcium Carbonate Antacid (CALCIUM ANTACID PO) Take 1,000 mg by mouth       carvedilol (COREG) 12.5 MG tablet Take 25 mg by mouth daily  180 tablet 3     Cholecalciferol (VITAMIN D3 PO) Take 2,000 Units by mouth daily        ciclopirox (PENLAC) 8 % SOLN Apply to adjacent skin and affected nails daily.  Remove with alcohol every 7 days, then repeat.       clonazePAM (KLONOPIN) 0.5 MG tablet        Colchicine (COLCRYS PO) Take 0.6 mg by mouth 2 times daily       cyclobenzaprine (FLEXERIL) 10 MG tablet        Ezetimibe (ZETIA PO) Take 10 mg by mouth       FUROSEMIDE PO Take 80 mg by mouth daily       GABAPENTIN PO Take 100 mg by mouth At Bedtime       gentamicin (GARAMYCIN) 0.1 % cream        gentian violet 2 % solution Pain every evening around orifice of peritoneal dialysis to dry and disinfect 59 mL 3     hydrALAZINE (APRESOLINE) 100 MG TABS Take 1 tablet by mouth 3 times daily. 90 tablet 2     isosorbide mononitrate (IMDUR) 60 MG 24 hr tablet Take 1 tablet (60 mg) by mouth 2 times daily DO NOT TAKE WITH SILDENAFIL (viagra). Please follow up in clinic as scheduled with labs prior. 180 tablet 0     ketoconazole (NIZORAL) 2 % cream Apply topically daily 1-2 times daily on face 60 g 11     ketoconazole (NIZORAL) 2 % cream Please use twice daily for 3 months 60 g 11     ketoconazole (NIZORAL) 2 % shampoo Apply topically three times a week 120 mL 11     ketoconazole (NIZORAL) 2 % shampoo Please use every other day to scalp and affected areas of face 120 mL 3     lidocaine (XYLOCAINE) 5 % ointment        Lidocaine HCl 2 % CREA Externally apply 1 dose topically daily 60 mL 0     losartan (COZAAR) 100 MG tablet Take 1 tablet by mouth daily. 30 tablet 2     MAGNESIUM OXIDE PO Take 400 mg by mouth daily       MAVYRET 100-40 MG TABS        MINOXIDIL PO Take 2.5 mg  by mouth daily       mupirocin (BACTROBAN) 2 % ointment        niacin (NIASPAN) 500 MG CR tablet        niacin (SLO-NIACIN) 500 MG CR tablet Take 1 tablet (500 mg) by mouth At Bedtime  3     omeprazole (PRILOSEC) 40 MG capsule Take 1 capsule by mouth as needed        ONDANSETRON PO Take 4 mg by mouth 3 times daily       OTHER MEDICAL SUPPLIES CPAP therapy       polyethylene glycol (MIRALAX) powder Take 17 g (1 capful) by mouth 2 times daily 510 g 3     psyllium 0.52 g capsule Take 1 capsule (0.52 g) by mouth daily 540 capsule 1     quiNINE 324 MG capsule Take 1 capsule (324 mg) by mouth nightly as needed for moderate pain 100 capsule 3     senna-docusate (YASMINE-COLACE) 8.6-50 MG per tablet Take 1-2 tablets by mouth 2 times daily as needed for constipation 50 tablet 0     sildenafil (VIAGRA) 50 MG tablet Take 0.5 tablets by mouth       TRAZODONE HCL PO Take 100 mg by mouth daily       triamcinolone (KENALOG) 0.025 % cream Put on scaly lesions around nose and behind ears on Sunday and maybe Saturday evening (not more than 2 times weekly) 80 g 1     triamcinolone (KENALOG) 0.025 % cream Please use to affected areas of legs 3-4 days, once weekly to affected areas of the face 80 g 0     UNABLE TO FIND MEDICATION NAME: Liquid Calcium 1 teaspoon w/ and between meals       UNABLE TO FIND 1 tsp. MEDICATION NAME: calcium       UNABLE TO FIND MEDICATION NAME: Centamicin Cream prn       Varenicline Tartrate (CHANTIX PO) Take 1 mg by mouth 2 times daily as needed        venlafaxine (EFFEXOR-XR) 37.5 MG 24 hr capsule        Per[facility] [employer] (DIANEAL PD-2.5%, CALCIUM 3.5 MEQ/L,) 396 MOSM/L SOLN 1,000 mLs by Dialysis route once for 1 dose 1000 mL 0       PAST SURGICAL HISTORY:  Past Surgical History:   Procedure Laterality Date     BIOPSY      Kidney @ [place]     CHOLECYSTECTOMY  2013     COLONOSCOPY  2016    [place]     LAPAROSCOPIC INSERTION CATHETER PERITONEAL DIALYSIS N/A 3/16/2016    Procedure: LAPAROSCOPIC  INSERTION CATHETER PERITONEAL DIALYSIS;  Surgeon: Winnie Colmenares MD;  Location: [facility] OR     SEPTOPLASTY, TURBINOPLASTY, COMBINED  Nov 2008    inferior turbinate reduction and septoplasty due to deviation to right     TURBINOPLASTY  July 2008    bilateral inferior turbinate reduction     TURBINOPLASTY  1/27/2014    Procedure: TURBINOPLASTY;  Bilateral Turbinate Reduction ;  Surgeon: Cindy Carrasquillo MD;  Location: [facility] OR       ALLERGIES   No Known Allergies    FAMILY HISTORY:  Family History   Problem Relation Age of Onset     Asthma Mother      Diabetes Mother      Hypertension Mother      Cerebrovascular Disease Mother      Cancer Mother      Alcohol/Drug Mother      HEART DISEASE Mother      Asthma Sister      Asthma Sister      Asthma Sister      Asthma Brother      Skin Cancer Brother      Asthma Brother      Asthma Brother      HEART DISEASE Sister      Diabetes Sister      Melanoma No family hx of        SOCIAL HISTORY:  Social History     Social History     Marital status: Single     Spouse name: Leonela     Number of children: 1     Years of education: N/A     Occupational History      Self Employed.     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 No weight gain/loss   ENT: No visual disturbance, ear ache, epistaxis, sore throat  Allergies/Immunologic: Negative.   Respiratory: No cough, hemoptysia  Cardiovascular: As per HPI  GI: No nausea, vomiting, hematemesis, melena, or hematochezia  : No urinary frequency, dysuria, or hematuria  Integument: Negative  Psychiatric:  "Negative  Neuro: Negative  Endocrinology: Negative   Musculoskeletal: Negative    EXAM:  /79 (BP Location: Left arm, Patient Position: Chair, Cuff Size: Adult Regular)  Pulse 77  Ht 1.727 m (5' 8\")  Wt 92.5 kg (204 lb)  SpO2 98%  BMI 31.02 kg/m2  In general, the patient is a pleasant male in no apparent distress.    HEENT: NC/AT.  PERRLA.  EOMI.  Sclerae white, not injected.  Nares clear.  Pharynx without erythema or exudate.  Dentition intact.    Neck: No adenopathy.  No thyromegaly. Carotids +4/4 bilaterally without bruits.  No jugular venous distension.   Heart: RRR. Normal S1, S2 splits physiologically. No murmur, rub, click, or gallop. The PMI is in the 5th ICS in the midclavicular line. There is no heave.    Lungs: CTA.  No ronchi, wheezes, rales.  No dullness to percussion.   Abdomen: Soft, nontender, nondistended. No organomegaly.  No bruits.   Extremities: No clubbing, cyanosis, or edema.  The pulses are +4/4 at the radial, brachial, femoral, popliteal, DP, and PT sites bilaterally.  No bruits are noted.  Neurologic: Alert and oriented to person/place/time, normal speech, gait and affect  Skin: No petechiae, purpura or rash.    Labs:  LIPID RESULTS:  Lab Results   Component Value Date    CHOL 123 08/06/2018    HDL 33 (L) 08/06/2018    LDL 70 08/06/2018    TRIG 103 08/06/2018    CHOLHDLRATIO 8.0 (H) 06/29/2015    NHDL 90 08/06/2018       LIVER ENZYME RESULTS:  Lab Results   Component Value Date    AST 6 08/06/2018    ALT 14 08/06/2018       CBC RESULTS:  Lab Results   Component Value Date    WBC 7.0 08/06/2018    RBC 3.12 (L) 08/06/2018    HGB 9.8 (L) 08/06/2018    HCT 31.7 (L) 08/06/2018     (H) 08/06/2018    MCH 31.4 08/06/2018    MCHC 30.9 (L) 08/06/2018    RDW 16.0 (H) 08/06/2018     08/06/2018       BMP RESULTS:  Lab Results   Component Value Date     08/06/2018    POTASSIUM 4.3 08/06/2018    CHLORIDE 103 08/06/2018    CO2 22 08/06/2018    ANIONGAP 17 (H) 08/06/2018    GLC " 120 (H) 08/06/2018    BUN 74 (H) 08/06/2018    CR 22.20 (H) 08/06/2018    GFRESTIMATED 2 (L) 08/06/2018    GFRESTBLACK 3 (L) 08/06/2018    BETH 7.7 (L) 08/06/2018        A1C RESULTS:  Lab Results   Component Value Date    A1C 5.7 01/06/2016       INR RESULTS:  Lab Results   Component Value Date    INR 1.06 08/06/2018    INR 0.96 04/30/2018           Assessment and Plan:     Mr DAMON Fan is a 55 yo M with history including hypertension and dyslipidemia/hypertriglyceridemia, ESRD and nw doing home peritoneal dialysis presenting for f/u.  We discussed with patient the results  - we discussed in length the importance of a heart healthy lifestyle and diet and especially quit smokng                                                                                                                                                                                                                                                                       Impression is that patient is asymptomatic for any CAD, but last cardiac stress was in 1/2016, so will re-do cardiac stress with dobutamine stress echo.  -Dobutmine stress echo  Once we have the dobutamine stress echo results - we will clear patient for a kidney TX if there  aree no contra-indications.    #HLD:  -Continue niacin, ezetimibe    #HTN:  BP at goal.  -Continue amlodipine 10 mg PO every day  -Continue losartan 100 mg PO every day  -Continue hydralazine 100 mg PO TID  -Continue coreg 12.5 mg PO BID  -Continue Imdur 60 mg PO QD      Cody Lloyd MD, PhD  Professor of Medicine  Division of Cardiology    CC  Patient Care Team:  Chris Barraza as PCP - General (Internal Medicine)  Laurel Mckinnon RN as Nurse Coordinator (Cardiology)  Cody Lloyd MD as MD (Cardiology)  Cody Venegas MD as MD (Dermapathology)  Ranjan Boo DPM (Podiatry)  Laurel Mckinnon, RN as Registered Nurse  Jaxon Watson MD as MD (Dermatology)  Dayana Castorena MD as MD  (Nephrology)  SELF, REFERRED

## 2018-11-16 NOTE — PATIENT INSTRUCTIONS
Patient Instructions:  It was a pleasure to see you in the cardiology clinic today.      If you have any questions, you can reach my nurse, Joseline Rivas LPN, at [phone].  Press Option #1 for the [facility], and then press Option #3 for nursing.    We are encouraging the use of Advanced Electron Beams to communicate with your HealthCare Provider    Medication Changes: None.    Studies Ordered: Echo Dobutamine Stress Test at your convenience.  Scheduling can be completed at [phone].    INSTRUCTIONS FOR DOBUTAMINE STRESS ECHO:    Nothing to eat or drink for 3 hours before test except for water.   No caffeine, tobacco, or alcohol for 12 hrs before test.   Stop your beta blocker two days before the test.  (Carvedilol)    The results from today include: Labs.    Please follow up: With Dr. Lloyd in one year with fasting labs prior.    Sincerely,    Cody Lloyd MD     If you have an urgent need after hours (8:00 am to 4:30 pm) please call [phone] and ask for the cardiology fellow on call.

## 2018-11-16 NOTE — NURSING NOTE
Labs: Patient was given results of the laboratory testing obtained today. Patient was instructed to return for the next laboratory testing in 1 year. Patient demonstrated understanding of this information and agreed to call with further questions or concerns.   Med Reconcile: Reviewed and verified all current medications with the patient. The updated medication list was printed and given to the patient.  Return Appointment: Patient given instructions regarding scheduling next clinic visit. Patient demonstrated understanding of this information and agreed to call with further questions or concerns.  Patient stated he understood all health information given and agreed to call with further questions or concerns.    Joseline Rivas LPN

## 2018-11-26 NOTE — NURSING NOTE
Reason For Visit:   Chief Complaint   Patient presents with     RECHECK     Prescription for inserts.        Pain Assessment  Patient Currently in Pain: Yes  Primary Pain Location:  (hallux)  Pain Orientation: Right               Current Outpatient Prescriptions   Medication Sig Dispense Refill     albuterol (2.5 MG/3ML) 0.083% neb solution        albuterol (PROAIR HFA, PROVENTIL HFA, VENTOLIN HFA) 108 (90 BASE) MCG/ACT inhaler Inhale 2 puffs into the lungs every 6 hours as needed        ALLOPURINOL PO Take 200 mg by mouth daily        alpha-d-galactosidase (BEANO) tablet Take 1 tablet by mouth as needed for intestinal gas       amLODIPine (NORVASC) 10 MG tablet Take 1 tablet by mouth daily.       aspirin 81 MG EC tablet Take 1 tablet (81 mg) by mouth daily . Please be seen in clinic with labs for further refills. 90 tablet 0     B Complex-C-Folic Acid (DIALYVITE PO) Take by mouth daily       WAYLON CONTOUR test strip        blood glucose monitoring (PRODIGY TWIST TOP 28G) lancets        Budesonide-Formoterol Fumarate (SYMBICORT IN) Inhale 2 puffs into the lungs 2 times daily        calcitRIOL (ROCALTROL) 0.25 MCG capsule        CALCIUM ANTACID ULTRA 1000 MG CHEW        Calcium Carbonate Antacid (CALCIUM ANTACID PO) Take 1,000 mg by mouth       carvedilol (COREG) 12.5 MG tablet Take 25 mg by mouth daily  180 tablet 3     Cholecalciferol (VITAMIN D3 PO) Take 2,000 Units by mouth daily        ciclopirox (PENLAC) 8 % SOLN Apply to adjacent skin and affected nails daily.  Remove with alcohol every 7 days, then repeat.       clonazePAM (KLONOPIN) 0.5 MG tablet        Colchicine (COLCRYS PO) Take 0.6 mg by mouth 2 times daily       cyclobenzaprine (FLEXERIL) 10 MG tablet        Ezetimibe (ZETIA PO) Take 10 mg by mouth       FUROSEMIDE PO Take 80 mg by mouth daily       GABAPENTIN PO Take 100 mg by mouth At Bedtime       gentamicin (GARAMYCIN) 0.1 % cream        gentian violet 2 % solution Pain every evening around  orifice of peritoneal dialysis to dry and disinfect 59 mL 3     hydrALAZINE (APRESOLINE) 100 MG TABS Take 1 tablet by mouth 3 times daily. 90 tablet 2     isosorbide mononitrate (IMDUR) 60 MG 24 hr tablet Take 1 tablet (60 mg) by mouth 2 times daily DO NOT TAKE WITH SILDENAFIL (viagra). Please follow up in clinic as scheduled with labs prior. 180 tablet 0     ketoconazole (NIZORAL) 2 % cream Apply topically daily 1-2 times daily on face 60 g 11     ketoconazole (NIZORAL) 2 % cream Please use twice daily for 3 months 60 g 11     ketoconazole (NIZORAL) 2 % shampoo Apply topically three times a week 120 mL 11     ketoconazole (NIZORAL) 2 % shampoo Please use every other day to scalp and affected areas of face 120 mL 3     lidocaine (XYLOCAINE) 5 % ointment        Lidocaine HCl 2 % CREA Externally apply 1 dose topically daily 60 mL 0     losartan (COZAAR) 100 MG tablet Take 1 tablet by mouth daily. 30 tablet 2     MAGNESIUM OXIDE PO Take 400 mg by mouth daily       MAVYRET 100-40 MG TABS        MINOXIDIL PO Take 2.5 mg by mouth daily       mupirocin (BACTROBAN) 2 % ointment        niacin (NIASPAN) 500 MG CR tablet        niacin (SLO-NIACIN) 500 MG CR tablet Take 1 tablet (500 mg) by mouth At Bedtime  3     omeprazole (PRILOSEC) 40 MG capsule Take 1 capsule by mouth as needed        ONDANSETRON PO Take 4 mg by mouth 3 times daily       OTHER MEDICAL SUPPLIES CPAP therapy       Peritoneal Dialysis Solutions (DIANEAL PD-2.5%, CALCIUM 3.5 MEQ/L,) 396 MOSM/L SOLN 1,000 mLs by Dialysis route once for 1 dose 1000 mL 0     polyethylene glycol (MIRALAX) powder Take 17 g (1 capful) by mouth 2 times daily 510 g 3     psyllium 0.52 g capsule Take 1 capsule (0.52 g) by mouth daily 540 capsule 1     quiNINE 324 MG capsule Take 1 capsule (324 mg) by mouth nightly as needed for moderate pain 100 capsule 3     senna-docusate (YASMINE-COLACE) 8.6-50 MG per tablet Take 1-2 tablets by mouth 2 times daily as needed for constipation 50 tablet 0      sildenafil (VIAGRA) 50 MG tablet Take 0.5 tablets (25 mg) by mouth daily as needed (prior to sexual intercourse.) 30 tablet 0     TRAZODONE HCL PO Take 100 mg by mouth daily       triamcinolone (KENALOG) 0.025 % cream Put on scaly lesions around nose and behind ears on Sunday and maybe Saturday evening (not more than 2 times weekly) 80 g 1     triamcinolone (KENALOG) 0.025 % cream Please use to affected areas of legs 3-4 days, once weekly to affected areas of the face 80 g 0     UNABLE TO FIND MEDICATION NAME: Liquid Calcium 1 teaspoon w/ and between meals       UNABLE TO FIND 1 tsp. MEDICATION NAME: calcium       UNABLE TO FIND MEDICATION NAME: Centamicin Cream prn       Varenicline Tartrate (CHANTIX PO) Take 1 mg by mouth 2 times daily as needed        venlafaxine (EFFEXOR-XR) 37.5 MG 24 hr capsule           No Known Allergies

## 2018-11-26 NOTE — MR AVS SNAPSHOT
After Visit Summary   11/26/2018    Rommel Fan    MRN: [id number]           Patient Information     Date Of Birth          1960        Visit Information        Provider Department      11/26/2018 12:40 PM [facility]        Today's Diagnoses     Right foot pain    -  1    Hammertoes of both feet           Follow-ups after your visit        Additional Services     ORTHOTICS REFERRAL       *This referral order prints off in the [facility]  (Orthotics & Prosthetics) Central Scheduling Office**    The [facility] Scheduling Staff will contact the patient to schedule appointments.     Central Scheduling Contact Information: [phone] ([place])    Custom foot orthotics with 1st mpj cutout bilaterally.    Please be aware that coverage of these services is subject to the terms and limitations of your health insurance plan.  Call member services at your health plan with any benefit or coverage questions.      Please bring the following to your appointment:    >>   Any x-rays, CTs or MRIs which have been performed.  Contact the facility where they were done to arrange for  prior to your scheduled appointment.    >>   List of current medications   >>   This referral request   >>   Any documents/labs given to you for this referral                  Who to contact     Please call your clinic at [phone] to:    Ask questions about your health    Make or cancel appointments    Discuss your medicines    Learn about your test results    Speak to your doctor            Additional Information About Your Visit        MyChart Information     [employer] gives you secure access to your electronic health record. If you see a primary care provider, you can also send messages to your care team and make appointments. If you have questions, please call your primary care clinic.  If you do not have a primary care provider, please call  [phone] and they will assist you.      [employer] is an electronic gateway that provides easy, online access to your medical records. With [employer], you can request a clinic appointment, read your test results, renew a prescription or communicate with your care team.     To access your existing account, please contact your [facility] or call [phone] for assistance.        Care EveryWhere ID     This is your Care EveryWhere ID. This could be used by other organizations to access your [place] medical records  [id number]         Blood Pressure from Last 3 Encounters:   11/15/18 120/79   10/26/18 (!) 133/92   08/06/18 115/66    Weight from Last 3 Encounters:   11/15/18 92.5 kg (204 lb)   10/26/18 91.3 kg (201 lb 3.2 oz)   08/06/18 101.8 kg (224 lb 6.4 oz)              We Performed the Following     ORTHOTICS REFERRAL          Today's Medication Changes          These changes are accurate as of 11/26/18  3:50 PM.  If you have any questions, ask your nurse or doctor.               Start taking these medicines.        Dose/Directions    ibuprofen 600 MG tablet   Commonly known as:  ADVIL/MOTRIN   Used for:  Right foot pain   Started by:  Ranjan Boo DPM        Dose:  600 mg   Take 1 tablet (600 mg) by mouth every 8 hours as needed for moderate pain   Quantity:  90 tablet   Refills:  0            Where to get your medicines      These medications were sent to [address] [address]     Phone:  [phone]     ibuprofen 600 MG tablet                Primary Care Provider Office Phone # Fax #    Chris Christi [phone]       [facility] [address]  [place] [facility] [address]        Equal Access to Services     SONYA BONILLA : Ana Cristina Martinez, jona velásquez, bernardo sorensen, elpidio mendez. So [facility] [phone].    ATENCIÓN: Si  vijaya jorge, tiene a das disposición servicios gratuitos de asistencia lingüística. Garcia auguste [phone].    We comply with applicable federal civil rights laws and [place] laws. We do not discriminate on the basis of race, color, national origin, age, disability, sex, sexual orientation, or gender identity.            Thank you!     Thank you for choosing [facility]  for your care. Our goal is always to provide you with excellent care. Hearing back from our patients is one way we can continue to improve our services. Please take a few minutes to complete the written survey that you may receive in the mail after your visit with us. Thank you!             Your Updated Medication List - Protect others around you: Learn how to safely use, store and throw away your medicines at www.disposemymeds.org.          This list is accurate as of 11/26/18  3:50 PM.  Always use your most recent med list.                   Brand Name Dispense Instructions for use Diagnosis    * albuterol 108 (90 Base) MCG/ACT inhaler    PROAIR HFA/PROVENTIL HFA/VENTOLIN HFA     Inhale 2 puffs into the lungs every 6 hours as needed        * albuterol (2.5 MG/3ML) 0.083% neb solution    PROVENTIL          ALLOPURINOL PO      Take 200 mg by mouth daily        alpha-d-galactosidase tablet      Take 1 tablet by mouth as needed for intestinal gas        amLODIPine 10 MG tablet    NORVASC     Take 1 tablet by mouth daily.        aspirin 81 MG EC tablet     90 tablet    Take 1 tablet (81 mg) by mouth daily . Please be seen in clinic with labs for further refills.    Bilateral claudication of lower limb (H), Hypertension secondary to other renal disorders       WAYLON CONTOUR test strip   Generic drug:  blood glucose monitoring           blood glucose monitoring lancets           calcitRIOL 0.25 MCG capsule    ROCALTROL          * CALCIUM ANTACID PO      Take 1,000 mg by mouth        * CALCIUM ANTACID ULTRA 1000 MG Chew   Generic drug:   calcium carbonate antacid           carvedilol 12.5 MG tablet    COREG    180 tablet    Take 25 mg by mouth daily    Hypertension secondary to other renal disorders, Cough, Shortness of breath       CHANTIX PO      Take 1 mg by mouth 2 times daily as needed        clonazePAM 0.5 MG tablet    klonoPIN          COLCRYS PO      Take 0.6 mg by mouth 2 times daily        cyclobenzaprine 10 MG tablet    FLEXERIL          DIALYVITE PO      Take by mouth daily        dianeal PD-2.5% dex (calcium 3.5 mEq/L) 396 MOSM/L Soln CAPD     1000 mL    1,000 mLs by Dialysis route once for 1 dose    PD catheter dysfunction, initial encounter (H), ESRD (end stage renal disease) (H)       FUROSEMIDE PO      Take 80 mg by mouth daily        GABAPENTIN PO      Take 100 mg by mouth At Bedtime        gentamicin 0.1 % cream    GARAMYCIN          gentian violet 2 % solution     59 mL    Pain every evening around orifice of peritoneal dialysis to dry and disinfect    Pyogenic granuloma       hydrALAZINE 100 MG Tabs tablet    APRESOLINE    90 tablet    Take 1 tablet by mouth 3 times daily.    Hypertension       ibuprofen 600 MG tablet    ADVIL/MOTRIN    90 tablet    Take 1 tablet (600 mg) by mouth every 8 hours as needed for moderate pain    Right foot pain       isosorbide mononitrate 60 MG 24 hr tablet    IMDUR    180 tablet    Take 1 tablet (60 mg) by mouth 2 times daily DO NOT TAKE WITH SILDENAFIL (viagra). Please follow up in clinic as scheduled with labs prior.    HTN (hypertension)       * ketoconazole 2 % shampoo    NIZORAL    120 mL    Please use every other day to scalp and affected areas of face    Dermatitis, seborrheic       * ketoconazole 2 % cream    NIZORAL    60 g    Please use twice daily for 3 months    Dermatitis, seborrheic       * ketoconazole 2 % cream    NIZORAL    60 g    Apply topically daily 1-2 times daily on face    Dermatitis, seborrheic       * ketoconazole 2 % shampoo    NIZORAL    120 mL    Apply topically three  times a week    Dermatitis, seborrheic       lidocaine 5 % ointment    XYLOCAINE          Lidocaine HCl 2 % Crea     60 mL    Externally apply 1 dose topically daily    Pain in limb, Plantar fascial fibromatosis       losartan 100 MG tablet    COZAAR    30 tablet    Take 1 tablet by mouth daily.    Hypertension       MAGNESIUM OXIDE PO      Take 400 mg by mouth daily        MAVYRET 100-40 MG per tablet   Generic drug:  glecaprevir-pibrentasvir           MINOXIDIL PO      Take 2.5 mg by mouth daily        mupirocin 2 % ointment    BACTROBAN          niacin 500 MG CR tablet    SLO-NIACIN     Take 1 tablet (500 mg) by mouth At Bedtime    Hypertriglyceridemia       niacin 500 MG CR tablet    NIASPAN          omeprazole 40 MG DR capsule    priLOSEC     Take 1 capsule by mouth as needed        ONDANSETRON PO      Take 4 mg by mouth 3 times daily        OTHER MEDICAL SUPPLIES      CPAP therapy        PENLAC 8 % solution   Generic drug:  ciclopirox      Apply to adjacent skin and affected nails daily.  Remove with alcohol every 7 days, then repeat.        polyethylene glycol powder    MIRALAX    510 g    Take 17 g (1 capful) by mouth 2 times daily    Constipation, unspecified constipation type       psyllium 0.52 g capsule     540 capsule    Take 1 capsule (0.52 g) by mouth daily    Diarrhea, unspecified type       quiNINE 324 MG capsule     100 capsule    Take 1 capsule (324 mg) by mouth nightly as needed for moderate pain    Chronic hepatitis C without hepatic coma (H)       senna-docusate 8.6-50 MG per tablet    YASMINE-COLACE    50 tablet    Take 1-2 tablets by mouth 2 times daily as needed for constipation    Peritoneal dialysis status (H)       sildenafil 50 MG tablet    VIAGRA    30 tablet    Take 0.5 tablets (25 mg) by mouth daily as needed (prior to sexual intercourse.)    Erectile dysfunction due to arterial insufficiency       SYMBICORT IN      Inhale 2 puffs into the lungs 2 times daily        TRAZODONE HCL PO       Take 100 mg by mouth daily        * triamcinolone 0.025 % cream    KENALOG    80 g    Please use to affected areas of legs 3-4 days, once weekly to affected areas of the face    Xerosis of skin, Nummular eczema       * triamcinolone 0.025 % cream    KENALOG    80 g    Put on scaly lesions around nose and behind ears on Sunday and maybe Saturday evening (not more than 2 times weekly)    Dermatitis, seborrheic       * UNABLE TO FIND      1 tsp. MEDICATION NAME: calcium        * UNABLE TO FIND      MEDICATION NAME: Centamicin Cream prn        UNABLE TO FIND      MEDICATION NAME: Liquid Calcium 1 teaspoon w/ and between meals        venlafaxine 37.5 MG 24 hr capsule    EFFEXOR-XR          VITAMIN D3 PO      Take 2,000 Units by mouth daily        ZETIA PO      Take 10 mg by mouth        * Notice:  This list has 12 medication(s) that are the same as other medications prescribed for you. Read the directions carefully, and ask your doctor or other care provider to review them with you.

## 2018-11-26 NOTE — PROGRESS NOTES
Past Medical History:   Diagnosis Date     Asthma 1979     COPD (chronic obstructive pulmonary disease) (H) 1988     Depressive disorder     currently on Effexor     End stage renal failure on dialysis (H)     Started dialysis 4/29/16     Gastrointestinal problem      Hepatitis C 2018    Treated this 2018     Hyperlipidemia LDL goal <100 4/3/2013     Hypertension      Hypertriglyceridemia 7/13/2015     Obstructive sleep apnea      Sinus problem      Patient Active Problem List   Diagnosis     Hand pain     Hypertension     Hyperlipidemia LDL goal <100     Obstructive sleep apnea     COPD (chronic obstructive pulmonary disease) (H)     Hypertriglyceridemia     Pre-transplant evaluation for ESRD (end stage renal disease)     Pre-operative clearance     Past Surgical History:   Procedure Laterality Date     BIOPSY      Kidney @ [facility]     CHOLECYSTECTOMY  2013     COLONOSCOPY  2016    [facility]     LAPAROSCOPIC INSERTION CATHETER PERITONEAL DIALYSIS N/A 3/16/2016    Procedure: LAPAROSCOPIC INSERTION CATHETER PERITONEAL DIALYSIS;  Surgeon: Winnie Colmenares MD;  Location: [facility] OR     SEPTOPLASTY, TURBINOPLASTY, COMBINED  Nov 2008    inferior turbinate reduction and septoplasty due to deviation to right     TURBINOPLASTY  July 2008    bilateral inferior turbinate reduction     TURBINOPLASTY  1/27/2014    Procedure: TURBINOPLASTY;  Bilateral Turbinate Reduction ;  Surgeon: Cindy Carrasquillo MD;  Location:  OR     Social History     Social History     Marital status: Single     Spouse name: Leonela     Number of children: 1     Years of education: N/A     Occupational History      Self Employed.     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  He relates he needs new orthotics.  Relates he gets foot pain, especially in the big toe on the right foot.  Relates no specific injuries.  He relates he is on peritoneal dialysis.  Relates he needs more ibuprofen.        OBJECTIVE FINDINGS:  DP and PT are 2/4 bilaterally.  He has a flexible flat foot type bilaterally.  He has functional hallux limitus bilaterally.  He has dorsally contracted digits 2-5 bilaterally.  Relates pain is in the first MPJ and hallux on the right when it occurs.      ASSESSMENT/PLAN:  Foot pain, right.  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  Pes planus bilaterally.  Hammertoes bilaterally.  Functional hallux limitus bilaterally.  Diagnosis and treatment options discussed with patient.  Prescription for new custom-foot orthotics given and use discussed with him.  Refilled his ibuprofen.  He will return to clinic and see me as needed.

## 2018-11-26 NOTE — LETTER
11/26/2018       RE: oRmmel Fan  [address]   [place] [address]     Dear Colleague,    Thank you for referring your patient, Rommel Fan, to the [facility]. Please see a copy of my visit note below.    Past Medical History:   Diagnosis Date     Asthma 1979     COPD (chronic obstructive pulmonary disease) (H) 1988     Depressive disorder     currently on Effexor     End stage renal failure on dialysis (H)     Started dialysis 4/29/16     Gastrointestinal problem      Hepatitis C 2018    Treated this 2018     Hyperlipidemia LDL goal <100 4/3/2013     Hypertension      Hypertriglyceridemia 7/13/2015     Obstructive sleep apnea      Sinus problem      Patient Active Problem List   Diagnosis     Hand pain     Hypertension     Hyperlipidemia LDL goal <100     Obstructive sleep apnea     COPD (chronic obstructive pulmonary disease) (H)     Hypertriglyceridemia     Pre-transplant evaluation for ESRD (end stage renal disease)     Pre-operative clearance     Past Surgical History:   Procedure Laterality Date     BIOPSY      Kidney @ [facility]     CHOLECYSTECTOMY  2013     COLONOSCOPY  2016    [place]     LAPAROSCOPIC INSERTION CATHETER PERITONEAL DIALYSIS N/A 3/16/2016    Procedure: LAPAROSCOPIC INSERTION CATHETER PERITONEAL DIALYSIS;  Surgeon: Winnie Colmenares MD;  Location: [facility] OR     SEPTOPLASTY, TURBINOPLASTY, COMBINED  Nov 2008    inferior turbinate reduction and septoplasty due to deviation to right     TURBINOPLASTY  July 2008    bilateral inferior turbinate reduction     TURBINOPLASTY  1/27/2014    Procedure: TURBINOPLASTY;  Bilateral Turbinate Reduction ;  Surgeon: Cindy Carrasquillo MD;  Location: [facility] OR     Social History     Social History     Marital status: Single     Spouse name: Leonela     Number of children: 1     Years of education: N/A     Occupational History      Self Employed.     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  He relates he needs new orthotics.  Relates he gets foot pain, especially in the big toe on the right foot.  Relates no specific injuries.  He relates he is on peritoneal dialysis.  Relates he needs more ibuprofen.        OBJECTIVE FINDINGS:  DP and PT are 2/4 bilaterally.  He has a flexible flat foot type bilaterally.  He has functional hallux limitus bilaterally.  He has dorsally contracted digits 2-5 bilaterally.  Relates pain is in the first MPJ and hallux on the right when it occurs.      ASSESSMENT/PLAN:  Foot pain, right.  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  Pes planus bilaterally.  Hammertoes bilaterally.  Functional hallux limitus bilaterally.  Diagnosis and treatment options discussed with patient.  Prescription for new custom-foot orthotics given and use discussed with him.  Refilled his ibuprofen.  He will return to clinic and see me as needed.         Again, thank you for allowing me to participate in the care of your patient.      Sincerely,    Ranjan Boo DPM

## 2019-01-01 ENCOUNTER — TELEPHONE (OUTPATIENT)
Dept: TRANSPLANT | Facility: CLINIC | Age: 59
End: 2019-01-01

## 2019-01-01 ENCOUNTER — MYC REFILL (OUTPATIENT)
Dept: ORTHOPEDICS | Facility: CLINIC | Age: 59
End: 2019-01-01

## 2019-01-01 ENCOUNTER — COMMITTEE REVIEW (OUTPATIENT)
Dept: TRANSPLANT | Facility: CLINIC | Age: 59
End: 2019-01-01

## 2019-01-01 ENCOUNTER — TRANSFERRED RECORDS (OUTPATIENT)
Dept: HEALTH INFORMATION MANAGEMENT | Facility: CLINIC | Age: 59
End: 2019-01-01

## 2019-01-01 ENCOUNTER — MYC REFILL (OUTPATIENT)
Dept: DERMATOLOGY | Facility: CLINIC | Age: 59
End: 2019-01-01

## 2019-01-01 ENCOUNTER — OFFICE VISIT (OUTPATIENT)
Dept: CARDIOLOGY | Facility: CLINIC | Age: 59
End: 2019-01-01
Attending: INTERNAL MEDICINE
Payer: MEDICAID

## 2019-01-01 ENCOUNTER — MYC MEDICAL ADVICE (OUTPATIENT)
Dept: CARDIOLOGY | Facility: CLINIC | Age: 59
End: 2019-01-01

## 2019-01-01 ENCOUNTER — HOSPITAL ENCOUNTER (OUTPATIENT)
Dept: CARDIOLOGY | Facility: CLINIC | Age: 59
Discharge: HOME | End: 2019-03-22
Attending: INTERNAL MEDICINE | Admitting: INTERNAL MEDICINE
Payer: MEDICAID

## 2019-01-01 VITALS
WEIGHT: 210.4 LBS | HEART RATE: 77 BPM | BODY MASS INDEX: 31.89 KG/M2 | DIASTOLIC BLOOD PRESSURE: 91 MMHG | HEIGHT: 68 IN | OXYGEN SATURATION: 98 % | SYSTOLIC BLOOD PRESSURE: 138 MMHG

## 2019-01-01 DIAGNOSIS — I15.1 HYPERTENSION SECONDARY TO OTHER RENAL DISORDERS: ICD-10-CM

## 2019-01-01 DIAGNOSIS — N18.6 END STAGE RENAL DISEASE (H): ICD-10-CM

## 2019-01-01 DIAGNOSIS — E78.5 HYPERLIPIDEMIA LDL GOAL <100: ICD-10-CM

## 2019-01-01 DIAGNOSIS — N18.6 ESRD (END STAGE RENAL DISEASE) ON DIALYSIS (H): ICD-10-CM

## 2019-01-01 DIAGNOSIS — G47.33 OBSTRUCTIVE SLEEP APNEA: ICD-10-CM

## 2019-01-01 DIAGNOSIS — L30.0 NUMMULAR ECZEMA: ICD-10-CM

## 2019-01-01 DIAGNOSIS — M79.671 PAIN IN BOTH FEET: Primary | ICD-10-CM

## 2019-01-01 DIAGNOSIS — L21.9 DERMATITIS, SEBORRHEIC: ICD-10-CM

## 2019-01-01 DIAGNOSIS — Z99.2 ESRD (END STAGE RENAL DISEASE) ON DIALYSIS (H): ICD-10-CM

## 2019-01-01 DIAGNOSIS — I25.10 CARDIOVASCULAR DISEASE: ICD-10-CM

## 2019-01-01 DIAGNOSIS — L85.3 XEROSIS OF SKIN: ICD-10-CM

## 2019-01-01 DIAGNOSIS — M79.672 PAIN IN BOTH FEET: Primary | ICD-10-CM

## 2019-01-01 DIAGNOSIS — Z01.818 PRE-OPERATIVE CLEARANCE: ICD-10-CM

## 2019-01-01 DIAGNOSIS — E78.1 HYPERTRIGLYCERIDEMIA: ICD-10-CM

## 2019-01-01 DIAGNOSIS — Z76.82 ORGAN TRANSPLANT CANDIDATE: ICD-10-CM

## 2019-01-01 DIAGNOSIS — M79.671 RIGHT FOOT PAIN: ICD-10-CM

## 2019-01-01 DIAGNOSIS — Z87.891 HISTORY OF TOBACCO USE: ICD-10-CM

## 2019-01-01 DIAGNOSIS — I15.1 HYPERTENSION SECONDARY TO OTHER RENAL DISORDERS: Primary | ICD-10-CM

## 2019-01-01 DIAGNOSIS — M72.2 PLANTAR FASCIAL FIBROMATOSIS: ICD-10-CM

## 2019-01-01 DIAGNOSIS — I73.9 BILATERAL CLAUDICATION OF LOWER LIMB (H): ICD-10-CM

## 2019-01-01 DIAGNOSIS — M79.609 PAIN IN LIMB: ICD-10-CM

## 2019-01-01 LAB
ALBUMIN SERPL-MCNC: 3.3 G/DL (ref 3.4–5)
ALP SERPL-CCNC: 69 U/L (ref 40–150)
ALT SERPL W P-5'-P-CCNC: 9 U/L (ref 0–70)
ANION GAP SERPL CALCULATED.3IONS-SCNC: 13 MMOL/L (ref 3–14)
AST SERPL W P-5'-P-CCNC: <3 U/L (ref 0–45)
BILIRUB SERPL-MCNC: 0.4 MG/DL (ref 0.2–1.3)
BUN SERPL-MCNC: 73 MG/DL (ref 7–30)
CALCIUM SERPL-MCNC: 8.6 MG/DL (ref 8.5–10.1)
CHLORIDE SERPL-SCNC: 98 MMOL/L (ref 94–109)
CHOLEST SERPL-MCNC: 150 MG/DL
CO2 SERPL-SCNC: 22 MMOL/L (ref 20–32)
CREAT SERPL-MCNC: 17.6 MG/DL (ref 0.66–1.25)
GFR SERPL CREATININE-BSD FRML MDRD: 3 ML/MIN/{1.73_M2}
GLUCOSE SERPL-MCNC: 114 MG/DL (ref 70–99)
HDLC SERPL-MCNC: 46 MG/DL
LDLC SERPL CALC-MCNC: 90 MG/DL
NONHDLC SERPL-MCNC: 104 MG/DL
POTASSIUM SERPL-SCNC: 3.8 MMOL/L (ref 3.4–5.3)
PROT SERPL-MCNC: 7.6 G/DL (ref 6.8–8.8)
SODIUM SERPL-SCNC: 133 MMOL/L (ref 133–144)
TRIGL SERPL-MCNC: 68 MG/DL

## 2019-01-01 PROCEDURE — 93350 STRESS TTE ONLY: CPT | Mod: 26 | Performed by: INTERNAL MEDICINE

## 2019-01-01 PROCEDURE — 36415 COLL VENOUS BLD VENIPUNCTURE: CPT | Performed by: INTERNAL MEDICINE

## 2019-01-01 PROCEDURE — 93018 CV STRESS TEST I&R ONLY: CPT | Performed by: INTERNAL MEDICINE

## 2019-01-01 PROCEDURE — 93325 DOPPLER ECHO COLOR FLOW MAPG: CPT | Mod: 26 | Performed by: INTERNAL MEDICINE

## 2019-01-01 PROCEDURE — 25500064 ZZH RX 255 OP 636: Performed by: INTERNAL MEDICINE

## 2019-01-01 PROCEDURE — 93016 CV STRESS TEST SUPVJ ONLY: CPT | Performed by: INTERNAL MEDICINE

## 2019-01-01 PROCEDURE — 80061 LIPID PANEL: CPT | Performed by: INTERNAL MEDICINE

## 2019-01-01 PROCEDURE — 25000125 ZZHC RX 250: Performed by: INTERNAL MEDICINE

## 2019-01-01 PROCEDURE — 25000128 H RX IP 250 OP 636: Performed by: INTERNAL MEDICINE

## 2019-01-01 PROCEDURE — 93321 DOPPLER ECHO F-UP/LMTD STD: CPT | Mod: 26 | Performed by: INTERNAL MEDICINE

## 2019-01-01 PROCEDURE — G0463 HOSPITAL OUTPT CLINIC VISIT: HCPCS | Mod: ZF

## 2019-01-01 PROCEDURE — 99214 OFFICE O/P EST MOD 30 MIN: CPT | Mod: ZP | Performed by: INTERNAL MEDICINE

## 2019-01-01 PROCEDURE — 40000264 ECHO STRESS ECHOCARDIOGRAM

## 2019-01-01 PROCEDURE — 80053 COMPREHEN METABOLIC PANEL: CPT | Performed by: INTERNAL MEDICINE

## 2019-01-01 RX ORDER — METOPROLOL TARTRATE 1 MG/ML
1-20 INJECTION, SOLUTION INTRAVENOUS
Status: ACTIVE | OUTPATIENT
Start: 2019-01-01 | End: 2019-01-01

## 2019-01-01 RX ORDER — SODIUM CHLORIDE 9 MG/ML
INJECTION, SOLUTION INTRAVENOUS CONTINUOUS
Status: ACTIVE | OUTPATIENT
Start: 2019-01-01 | End: 2019-01-01

## 2019-01-01 RX ORDER — TRIAMCINOLONE ACETONIDE 0.25 MG/G
CREAM TOPICAL
Qty: 80 G | Refills: 0 | OUTPATIENT
Start: 2019-01-01

## 2019-01-01 RX ORDER — TRIAMCINOLONE ACETONIDE 0.25 MG/G
CREAM TOPICAL
Qty: 80 G | Refills: 1 | OUTPATIENT
Start: 2019-01-01

## 2019-01-01 RX ORDER — KETOCONAZOLE 20 MG/ML
SHAMPOO TOPICAL
Qty: 120 ML | Refills: 11 | OUTPATIENT
Start: 2019-01-01

## 2019-01-01 RX ORDER — KETOCONAZOLE 20 MG/G
CREAM TOPICAL DAILY
Qty: 60 G | Refills: 11 | OUTPATIENT
Start: 2019-01-01

## 2019-01-01 RX ORDER — DOBUTAMINE HYDROCHLORIDE 200 MG/100ML
10-50 INJECTION INTRAVENOUS CONTINUOUS
Status: SHIPPED | OUTPATIENT
Start: 2019-01-01 | End: 2019-01-01

## 2019-01-01 RX ORDER — IBUPROFEN 600 MG/1
600 TABLET, FILM COATED ORAL EVERY 8 HOURS PRN
Qty: 90 TABLET | Refills: 0 | Status: SHIPPED | OUTPATIENT
Start: 2019-01-01 | End: 2019-07-08

## 2019-01-01 RX ORDER — ASPIRIN 81 MG/1
81 TABLET ORAL DAILY
Qty: 90 TABLET | Refills: 2 | Status: SHIPPED | OUTPATIENT
Start: 2019-01-01

## 2019-01-01 RX ORDER — ATROPINE SULFATE 0.4 MG/ML
.2-2 AMPUL (ML) INJECTION
Status: COMPLETED | OUTPATIENT
Start: 2019-01-01 | End: 2019-01-01

## 2019-01-01 RX ORDER — KETOCONAZOLE 20 MG/G
CREAM TOPICAL
Qty: 60 G | Refills: 11 | OUTPATIENT
Start: 2019-01-01

## 2019-01-01 RX ADMIN — DOBUTAMINE HYDROCHLORIDE 10 MCG/KG/MIN: 200 INJECTION INTRAVENOUS at 10:38

## 2019-01-01 RX ADMIN — METOPROLOL TARTRATE 4 MG: 5 INJECTION, SOLUTION INTRAVENOUS at 11:01

## 2019-01-01 RX ADMIN — ATROPINE SULFATE 1.6 MG: 0.4 INJECTION, SOLUTION INTRAMUSCULAR; INTRAVENOUS; SUBCUTANEOUS at 10:53

## 2019-01-01 RX ADMIN — PERFLUTREN 6 ML: 6.52 INJECTION, SUSPENSION INTRAVENOUS at 11:01

## 2019-01-01 ASSESSMENT — PAIN SCALES - GENERAL: PAINLEVEL: NO PAIN (0)

## 2019-01-01 ASSESSMENT — ENCOUNTER SYMPTOMS
ARTHRALGIAS: 0
MUSCLE CRAMPS: 1
BACK PAIN: 1
MYALGIAS: 1
NECK PAIN: 0
STIFFNESS: 1
MUSCLE WEAKNESS: 0
JOINT SWELLING: 0

## 2019-01-01 ASSESSMENT — MIFFLIN-ST. JEOR: SCORE: 1753.87

## 2019-02-19 NOTE — TELEPHONE ENCOUNTER
"Rommel Mita called to follow-up on progress in kidney transplant evaluation. Per committee review note the following were needed:    Outstanding issues:  1.  Imaging              Aorta & iliac US scheduled 8/17/18-Done            Abdominal CT to check poor pulses per Rob Gomez-Done            Fibroscan to f/u past Hep C Rx-Done2.   ABO X 2 missed on eval day. Needs these drawn on a return visit-Done3.   Neuropsych eval-Done  4.  Low-dose chest CT (?with PCP) (CTA chest done 10/9/18 @ OSH)  5.  Pulmonary evaluation/establish ongoing follow-up (MBD to clarify this with Nephrology team)   6.  Colonoscopy before 2019? (MBD to clarify this)  7.  *Cardiology risk assessment - saw Dr. Lloyd on 11/15/18 at which time a dobutamine stress test was ordered to be done before transplant clearance could be given. Rommel has not scheduled test yet. Message sent to .  8.  Dental-Anothony states he will make an appointment today-he was waiting until after the 1st of the year for insurance purposes.  9.  Weight loss (goal <229 per dietary note 8/6/18)-Anothjose rafael states currently his weight fluctuates between 189-197. He is \"watching what I eat and decreasing my portions\"    "

## 2019-03-22 NOTE — PROGRESS NOTES
Pt here for dobutamine stress test. Test, meds and side effects reviewed with patient. Achieved target HR at 50 mcg Dobutamine and a total of 1.6mg IV atropine. Gave a total of 4mg IV Metoprolol to bring HR back to baseline. Post monitoring complete and VSS.  Pt escorted out to the gold waiting room.

## 2019-04-01 NOTE — PROGRESS NOTES
HPI:     Mr DAMON Fan is a 58 yo M with history including hypertension and dyslipidemia/hypertriglyceridemia, ESRD and nw doing home peritoneal dialysis presenting for f/u.  He is here for clearance prior to being active on the renal tx list.   Denies and CP/SOB at rest or with exertion. No lightheadedness, dizziness, palpitations.    Patient underwent a low dose chest CT for cancer screening - which was negative   coronary arteries are heavily calcified        PAST MEDICAL HISTORY:  Past Medical History:   Diagnosis Date     Asthma 1979     COPD (chronic obstructive pulmonary disease) (H) 1988     Depressive disorder     currently on Effexor     End stage renal failure on dialysis (H)     Started dialysis 4/29/16     Gastrointestinal problem      Hepatitis C 2018    Treated this 2018     Hyperlipidemia LDL goal <100 4/3/2013     Hypertension      Hypertriglyceridemia 7/13/2015     Obstructive sleep apnea      Sinus problem        CURRENT MEDICATIONS:  Current Outpatient Medications   Medication Sig Dispense Refill     albuterol (2.5 MG/3ML) 0.083% neb solution        albuterol (PROAIR HFA, PROVENTIL HFA, VENTOLIN HFA) 108 (90 BASE) MCG/ACT inhaler Inhale 2 puffs into the lungs every 6 hours as needed        ALLOPURINOL PO Take 200 mg by mouth daily        alpha-d-galactosidase (BEANO) tablet Take 1 tablet by mouth as needed for intestinal gas       amLODIPine (NORVASC) 10 MG tablet Take 1 tablet by mouth daily.       aspirin 81 MG EC tablet Take 1 tablet (81 mg) by mouth daily . Please be seen in clinic with labs for further refills. 90 tablet 2     B Complex-C-Folic Acid (DIALYVITE PO) Take by mouth daily       WAYLON CONTOUR test strip        blood glucose monitoring (PRODIGY TWIST TOP 28G) lancets        Budesonide-Formoterol Fumarate (SYMBICORT IN) Inhale 2 puffs into the lungs 2 times daily        calcitRIOL (ROCALTROL) 0.25 MCG capsule        CALCIUM ANTACID ULTRA 1000 MG CHEW        Calcium Carbonate Antacid  (CALCIUM ANTACID PO) Take 1,000 mg by mouth       carvedilol (COREG) 12.5 MG tablet Take 25 mg by mouth daily  180 tablet 3     Cholecalciferol (VITAMIN D3 PO) Take 2,000 Units by mouth daily        ciclopirox (PENLAC) 8 % SOLN Apply to adjacent skin and affected nails daily.  Remove with alcohol every 7 days, then repeat.       clonazePAM (KLONOPIN) 0.5 MG tablet        Colchicine (COLCRYS PO) Take 0.6 mg by mouth 2 times daily       cyclobenzaprine (FLEXERIL) 10 MG tablet        Ezetimibe (ZETIA PO) Take 10 mg by mouth       FUROSEMIDE PO Take 80 mg by mouth daily       GABAPENTIN PO Take 100 mg by mouth At Bedtime       gentamicin (GARAMYCIN) 0.1 % cream        gentian violet 2 % solution Pain every evening around orifice of peritoneal dialysis to dry and disinfect 59 mL 3     hydrALAZINE (APRESOLINE) 100 MG TABS Take 1 tablet by mouth 3 times daily. 90 tablet 2     ibuprofen (ADVIL/MOTRIN) 600 MG tablet Take 1 tablet (600 mg) by mouth every 8 hours as needed for moderate pain 90 tablet 0     isosorbide mononitrate (IMDUR) 60 MG 24 hr tablet Take 1 tablet (60 mg) by mouth 2 times daily DO NOT TAKE WITH SILDENAFIL (viagra). Please follow up in clinic as scheduled with labs prior. (Patient not taking: Reported on 4/1/2019) 180 tablet 0     ketoconazole (NIZORAL) 2 % cream Apply topically daily 1-2 times daily on face 60 g 11     ketoconazole (NIZORAL) 2 % cream Please use twice daily for 3 months 60 g 11     ketoconazole (NIZORAL) 2 % shampoo Apply topically three times a week 120 mL 11     ketoconazole (NIZORAL) 2 % shampoo Please use every other day to scalp and affected areas of face 120 mL 3     lidocaine (XYLOCAINE) 5 % ointment        Lidocaine HCl 2 % CREA Externally apply 1 dose topically daily 60 mL 0     losartan (COZAAR) 100 MG tablet Take 1 tablet by mouth daily. 30 tablet 2     MAGNESIUM OXIDE PO Take 400 mg by mouth daily       MAVYRET 100-40 MG TABS        MINOXIDIL PO Take 2.5 mg by mouth daily        mupirocin (BACTROBAN) 2 % ointment        niacin (NIASPAN) 500 MG CR tablet        niacin (SLO-NIACIN) 500 MG CR tablet Take 1 tablet (500 mg) by mouth At Bedtime  3     omeprazole (PRILOSEC) 40 MG capsule Take 1 capsule by mouth as needed        ONDANSETRON PO Take 4 mg by mouth 3 times daily       OTHER MEDICAL SUPPLIES CPAP therapy       Peritoneal Dialysis Solutions (DIANEAL PD-2.5%, CALCIUM 3.5 MEQ/L,) 396 MOSM/L SOLN 1,000 mLs by Dialysis route once for 1 dose 1000 mL 0     polyethylene glycol (MIRALAX) powder Take 17 g (1 capful) by mouth 2 times daily 510 g 3     psyllium 0.52 g capsule Take 1 capsule (0.52 g) by mouth daily 540 capsule 1     quiNINE 324 MG capsule Take 1 capsule (324 mg) by mouth nightly as needed for moderate pain 100 capsule 3     senna-docusate (YASMINE-COLACE) 8.6-50 MG per tablet Take 1-2 tablets by mouth 2 times daily as needed for constipation 50 tablet 0     sildenafil (VIAGRA) 50 MG tablet Take 0.5 tablets (25 mg) by mouth daily as needed (prior to sexual intercourse.) 30 tablet 0     TRAZODONE HCL PO Take 100 mg by mouth daily       triamcinolone (KENALOG) 0.025 % cream Put on scaly lesions around nose and behind ears on Sunday and maybe Saturday evening (not more than 2 times weekly) 80 g 1     triamcinolone (KENALOG) 0.025 % cream Please use to affected areas of legs 3-4 days, once weekly to affected areas of the face 80 g 0     UNABLE TO FIND MEDICATION NAME: Liquid Calcium 1 teaspoon w/ and between meals       UNABLE TO FIND 1 tsp. MEDICATION NAME: calcium       UNABLE TO FIND MEDICATION NAME: Centamicin Cream prn       Varenicline Tartrate (CHANTIX PO) Take 1 mg by mouth 2 times daily as needed        venlafaxine (EFFEXOR-XR) 37.5 MG 24 hr capsule          PAST SURGICAL HISTORY:  Past Surgical History:   Procedure Laterality Date     BIOPSY      Kidney @ [facility]     CHOLECYSTECTOMY  2013     COLONOSCOPY  2016    [place]     LAPAROSCOPIC INSERTION CATHETER PERITONEAL DIALYSIS  N/A 3/16/2016    Procedure: LAPAROSCOPIC INSERTION CATHETER PERITONEAL DIALYSIS;  Surgeon: Winnie Colmenares MD;  Location: [facility] OR     SEPTOPLASTY, TURBINOPLASTY, COMBINED  Nov 2008    inferior turbinate reduction and septoplasty due to deviation to right     TURBINOPLASTY  July 2008    bilateral inferior turbinate reduction     TURBINOPLASTY  1/27/2014    Procedure: TURBINOPLASTY;  Bilateral Turbinate Reduction ;  Surgeon: Cindy Carrasquillo MD;  Location: [facility] OR       ALLERGIES   No Known Allergies    FAMILY HISTORY:  Family History   Problem Relation Age of Onset     Asthma Mother      Diabetes Mother      Hypertension Mother      Cerebrovascular Disease Mother      Cancer Mother      Alcohol/Drug Mother      Heart Disease Mother      Asthma Sister      Asthma Sister      Asthma Sister      Asthma Brother      Skin Cancer Brother      Asthma Brother      Asthma Brother      Heart Disease Sister      Diabetes Sister      Melanoma No family hx of        SOCIAL HISTORY:  Social History     Socioeconomic History     Marital status: Single     Spouse name: Leonela     Number of children: 1     Years of education: None     Highest education level: None   Occupational History     Occupation:      Employer: SELF EMPLOYED.   Social Needs     Financial resource strain: None     Food insecurity:     Worry: None     Inability: None     Transportation needs:     Medical: None     Non-medical: None   Tobacco Use     Smoking status: Former Smoker     Packs/day: 0.50     Years: 20.00     Pack years: 10.00     Types: Cigarettes     Start date: 1/1/1975     Last attempt to quit: 1/1/2016     Years since quitting: 3.2     Smokeless tobacco: Never Used   Substance and Sexual Activity     Alcohol use: Yes     Comment: 2 drinks/week     Drug use: No     Sexual activity: None   Lifestyle     Physical activity:     Days per week: None     Minutes per session: None     Stress: None   Relationships     Social connections:     Talks  "on phone: None     Gets together: None     Attends Religion service: None     Active member of club or organization: None     Attends meetings of clubs or organizations: None     Relationship status: None     Intimate partner violence:     Fear of current or ex partner: None     Emotionally abused: None     Physically abused: None     Forced sexual activity: None   Other Topics Concern     Parent/sibling w/ CABG, MI or angioplasty before 65F 55M? 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 No weight gain/loss   ENT: No visual disturbance, ear ache, epistaxis, sore throat  Allergies/Immunologic: Negative.   Respiratory: No cough, hemoptysia  Cardiovascular: As per HPI  GI: No nausea, vomiting, hematemesis, melena, or hematochezia  : No urinary frequency, dysuria, or hematuria  Integument: Negative  Psychiatric: Negative  Neuro: Negative  Endocrinology: Negative   Musculoskeletal: Negative    EXAM:  BP (!) 138/91 (BP Location: Right arm, Patient Position: Chair, Cuff Size: Adult Large)   Pulse 77   Ht 1.727 m (5' 8\")   Wt 95.4 kg (210 lb 6.4 oz)   SpO2 98%   BMI 31.99 kg/m    In general, the patient is a pleasant male in no apparent distress.    HEENT: NC/AT.  PERRLA.  EOMI.  Sclerae white, not injected.  Nares clear.  Pharynx without erythema or exudate.  Dentition intact.    Neck: No adenopathy.  No thyromegaly. Carotids +4/4 bilaterally without bruits.  No jugular venous distension.   Heart: RRR. Normal S1, S2 splits physiologically. No murmur, rub, click, or gallop. The PMI is in the 5th ICS in the midclavicular line. There is no heave.    Lungs: CTA.  No ronchi, wheezes, rales.  No dullness to percussion.   Abdomen: Soft, nontender, nondistended. No organomegaly.  No bruits.   Extremities: No clubbing, cyanosis, or edema.  The pulses are +4/4 at the radial, brachial, femoral, popliteal, DP, and PT sites " bilaterally.  No bruits are noted.  Neurologic: Alert and oriented to person/place/time, normal speech, gait and affect  Skin: No petechiae, purpura or rash.    /90 mmHg    Labs:  LIPID RESULTS:  Lab Results   Component Value Date    CHOL 123 08/06/2018    HDL 33 (L) 08/06/2018    LDL 70 08/06/2018    TRIG 103 08/06/2018    CHOLHDLRATIO 8.0 (H) 06/29/2015    NHDL 90 08/06/2018       LIVER ENZYME RESULTS:  Lab Results   Component Value Date    AST PENDING 04/01/2019    ALT PENDING 04/01/2019       CBC RESULTS:  Lab Results   Component Value Date    WBC 7.0 08/06/2018    RBC 3.12 (L) 08/06/2018    HGB 9.8 (L) 08/06/2018    HCT 31.7 (L) 08/06/2018     (H) 08/06/2018    MCH 31.4 08/06/2018    MCHC 30.9 (L) 08/06/2018    RDW 16.0 (H) 08/06/2018     08/06/2018       BMP RESULTS:  Lab Results   Component Value Date     04/01/2019    POTASSIUM 3.8 04/01/2019    CHLORIDE 98 04/01/2019    CO2 PENDING 04/01/2019    ANIONGAP PENDING 04/01/2019    GLC PENDING 04/01/2019    BUN PENDING 04/01/2019    CR PENDING 04/01/2019    GFRESTIMATED PENDING 04/01/2019    GFRESTBLACK PENDING 04/01/2019    BETH PENDING 04/01/2019        A1C RESULTS:  Lab Results   Component Value Date    A1C 5.7 01/06/2016       INR RESULTS:  Lab Results   Component Value Date    INR 1.06 08/06/2018    INR 0.96 04/30/2018       Procedures:    Normal, low-risk dobutamine echocardiogram.  The target heart rate was achieved. Normal heart rate and BP response to  dobutamine.  Normal biventricular size and global systolic function at baseline, LVEF=55-  60%. Severe LVH noted.  With dobutamine, LVEF increased to >70% and LV cavity size decreased  appropriately.  No regional wall motion abnormalities are present at rest or with dobutamine.  No angina was elicited.  No ECG evidence of ischemia. Baseline ECG shows inferolateral TWI.  No significant valvular abnormalities are noted on screening Doppler exam.  The aortic root and visualized  ascending aorta are normal.  _____________________________________________________________________________  __     Stress  The patient did not exhibit any symptoms during drug infusion.  The drug infusion was stopped due to target heart rate achieved.  The maximum dose of dobutamine was 50mcg/kg/min.  The maximum dose of atropine was 1.6mg.  The maximum dose of metoprolol was 4.0mg.  Definity (NDC #71621-951-81) given intravenously.  Patient was given 6ml mixture of 1.5ml Definity and 8.5ml saline.  4 ml wasted.  IV start location L Upper arm .  Definity Expiration 19 .  Definity Lot # 6218 .     Stress Results                                       Maximum Predicted HR:   163 bpm             Target HR: 139 bpm        % Maximum Predicted HR: 84 %                          Stage  DurationHeart Rate  BP   Dose                              (mm:ss)   (bpm)                      Baseline  0:00      78    150/1010.00                        Peak   15:41     137    203/90 50.00                            Stress Duration:   15:41 mm:ss                      Maximum Stress HR: 137 bpm     Mitral Valve  There is trace mitral regurgitation.     Aortic Valve  The aortic valve is trileaflet. There is trace aortic regurgitation.     Pericardium  There is no pericardial effusion.        Procedure  Dobutamine Echo Complete. Contrast Definity.  _____________________________________________________________________________  __           Doppler Measurements & Calculations  TR max lily: 228.0 cm/sec  TR max P.8 mmHg      Assessment and Plan:     We discussed the results with patient.  We discussed the importance of a heart healthy diet and lifestyle.    We discussed the results of lung CT and calcifications of the coronary arteries - which is common in end-stage renal disease.    Echostress test was normal  Patient can be cleared for renal transplant form cardiovascular point of view.  We continue with same medical  regimen.      Please follow up: With Dr. Lloyd in six months with fasting labs prior.    Cody Lloyd MD, PhD  Professor of Medicine  Division of Cardiology    CC  Patient Care Team:  Chris Barraza as PCP - General (Internal Medicine)  Cody Lloyd MD as MD (Cardiology)  Cody Venegas MD as MD (Dermapathology)  Ranjan Boo DPM (Podiatry)  Jaxon Watson MD as MD (Dermatology)  Dayana Castorena MD as MD (Nephrology)  Joseline Rivas LPN as Nurse Coordinator (Cardiology)  SELF, REFERRED  Answers for HPI/ROS submitted by the patient on 4/1/2019   General Symptoms: No  Skin Symptoms: No  HENT Symptoms: No  EYE SYMPTOMS: No  HEART SYMPTOMS: No  LUNG SYMPTOMS: No  INTESTINAL SYMPTOMS: No  URINARY SYMPTOMS: No  REPRODUCTIVE SYMPTOMS: No  SKELETAL SYMPTOMS: Yes  BLOOD SYMPTOMS: No  NERVOUS SYSTEM SYMPTOMS: No  MENTAL HEALTH SYMPTOMS: No  Back pain: Yes  Muscle aches: Yes  Neck pain: No  Swollen joints: No  Joint pain: No  Bone pain: Yes  Muscle cramps: Yes  Muscle weakness: No  Joint stiffness: Yes  Bone fracture: No

## 2019-04-01 NOTE — PATIENT INSTRUCTIONS
Patient Instructions:  It was a pleasure to see you in the cardiology clinic today.      If you have any questions, you can reach my nurse, Joseline Rivas LPN, at [phone].  Press Option #1 for the [facility], and then press Option #3 for nursing.    We are encouraging the use of [employer] to communicate with your HealthCare Provider    Medication Changes: None.    Studies Ordered: None.    The results from today include: Labs.    Please follow up: With Dr. Lloyd in six months with fasting labs prior.    Sincerely,    Cody Lloyd MD     If you have an urgent need after hours (8:00 am to 4:30 pm) please call [phone] and ask for the cardiology fellow on call.

## 2019-04-01 NOTE — LETTER
4/1/2019      RE: Rommel Fan  [address] [place] [address]       Dear Colleague,    Thank you for the opportunity to participate in the care of your patient, Rommel Fan, at the [facility]. Please see a copy of my visit note below.    HPI:     Mr DAMON Fan is a 58 yo M with history including hypertension and dyslipidemia/hypertriglyceridemia, ESRD and nw doing home peritoneal dialysis presenting for f/u.  He is here for clearance prior to being active on the renal tx list.   Denies and CP/SOB at rest or with exertion. No lightheadedness, dizziness, palpitations.    Patient underwent a low dose chest CT for cancer screening - which was negative   coronary arteries are heavily calcified        PAST MEDICAL HISTORY:  Past Medical History:   Diagnosis Date     Asthma 1979     COPD (chronic obstructive pulmonary disease) (H) 1988     Depressive disorder     currently on Effexor     End stage renal failure on dialysis (H)     Started dialysis 4/29/16     Gastrointestinal problem      Hepatitis C 2018    Treated this 2018     Hyperlipidemia LDL goal <100 4/3/2013     Hypertension      Hypertriglyceridemia 7/13/2015     Obstructive sleep apnea      Sinus problem        CURRENT MEDICATIONS:  Current Outpatient Medications   Medication Sig Dispense Refill     albuterol (2.5 MG/3ML) 0.083% neb solution        albuterol (PROAIR HFA, PROVENTIL HFA, VENTOLIN HFA) 108 (90 BASE) MCG/ACT inhaler Inhale 2 puffs into the lungs every 6 hours as needed        ALLOPURINOL PO Take 200 mg by mouth daily        alpha-d-galactosidase (BEANO) tablet Take 1 tablet by mouth as needed for intestinal gas       amLODIPine (NORVASC) 10 MG tablet Take 1 tablet by mouth daily.       aspirin 81 MG EC tablet Take 1 tablet (81 mg) by mouth daily . Please be seen in clinic with labs for further refills. 90 tablet 2     B Complex-C-Folic Acid (DIALYVITE PO) Take by mouth daily        WAYLON CONTOUR test strip        blood glucose monitoring (PRODIGY TWIST TOP 28G) lancets        Budesonide-Formoterol Fumarate (SYMBICORT IN) Inhale 2 puffs into the lungs 2 times daily        calcitRIOL (ROCALTROL) 0.25 MCG capsule        CALCIUM ANTACID ULTRA 1000 MG CHEW        Calcium Carbonate Antacid (CALCIUM ANTACID PO) Take 1,000 mg by mouth       carvedilol (COREG) 12.5 MG tablet Take 25 mg by mouth daily  180 tablet 3     Cholecalciferol (VITAMIN D3 PO) Take 2,000 Units by mouth daily        ciclopirox (PENLAC) 8 % SOLN Apply to adjacent skin and affected nails daily.  Remove with alcohol every 7 days, then repeat.       clonazePAM (KLONOPIN) 0.5 MG tablet        Colchicine (COLCRYS PO) Take 0.6 mg by mouth 2 times daily       cyclobenzaprine (FLEXERIL) 10 MG tablet        Ezetimibe (ZETIA PO) Take 10 mg by mouth       FUROSEMIDE PO Take 80 mg by mouth daily       GABAPENTIN PO Take 100 mg by mouth At Bedtime       gentamicin (GARAMYCIN) 0.1 % cream        gentian violet 2 % solution Pain every evening around orifice of peritoneal dialysis to dry and disinfect 59 mL 3     hydrALAZINE (APRESOLINE) 100 MG TABS Take 1 tablet by mouth 3 times daily. 90 tablet 2     ibuprofen (ADVIL/MOTRIN) 600 MG tablet Take 1 tablet (600 mg) by mouth every 8 hours as needed for moderate pain 90 tablet 0     isosorbide mononitrate (IMDUR) 60 MG 24 hr tablet Take 1 tablet (60 mg) by mouth 2 times daily DO NOT TAKE WITH SILDENAFIL (viagra). Please follow up in clinic as scheduled with labs prior. (Patient not taking: Reported on 4/1/2019) 180 tablet 0     ketoconazole (NIZORAL) 2 % cream Apply topically daily 1-2 times daily on face 60 g 11     ketoconazole (NIZORAL) 2 % cream Please use twice daily for 3 months 60 g 11     ketoconazole (NIZORAL) 2 % shampoo Apply topically three times a week 120 mL 11     ketoconazole (NIZORAL) 2 % shampoo Please use every other day to scalp and affected areas of face 120 mL 3      lidocaine (XYLOCAINE) 5 % ointment        Lidocaine HCl 2 % CREA Externally apply 1 dose topically daily 60 mL 0     losartan (COZAAR) 100 MG tablet Take 1 tablet by mouth daily. 30 tablet 2     MAGNESIUM OXIDE PO Take 400 mg by mouth daily       MAVYRET 100-40 MG TABS        MINOXIDIL PO Take 2.5 mg by mouth daily       mupirocin (BACTROBAN) 2 % ointment        niacin (NIASPAN) 500 MG CR tablet        niacin (SLO-NIACIN) 500 MG CR tablet Take 1 tablet (500 mg) by mouth At Bedtime  3     omeprazole (PRILOSEC) 40 MG capsule Take 1 capsule by mouth as needed        ONDANSETRON PO Take 4 mg by mouth 3 times daily       OTHER MEDICAL SUPPLIES CPAP therapy       Peritoneal Dialysis Solutions (DIANEAL PD-2.5%, CALCIUM 3.5 MEQ/L,) 396 MOSM/L SOLN 1,000 mLs by Dialysis route once for 1 dose 1000 mL 0     polyethylene glycol (MIRALAX) powder Take 17 g (1 capful) by mouth 2 times daily 510 g 3     psyllium 0.52 g capsule Take 1 capsule (0.52 g) by mouth daily 540 capsule 1     quiNINE 324 MG capsule Take 1 capsule (324 mg) by mouth nightly as needed for moderate pain 100 capsule 3     senna-docusate (YASMINE-COLACE) 8.6-50 MG per tablet Take 1-2 tablets by mouth 2 times daily as needed for constipation 50 tablet 0     sildenafil (VIAGRA) 50 MG tablet Take 0.5 tablets (25 mg) by mouth daily as needed (prior to sexual intercourse.) 30 tablet 0     TRAZODONE HCL PO Take 100 mg by mouth daily       triamcinolone (KENALOG) 0.025 % cream Put on scaly lesions around nose and behind ears on Sunday and maybe Saturday evening (not more than 2 times weekly) 80 g 1     triamcinolone (KENALOG) 0.025 % cream Please use to affected areas of legs 3-4 days, once weekly to affected areas of the face 80 g 0     UNABLE TO FIND MEDICATION NAME: Liquid Calcium 1 teaspoon w/ and between meals       UNABLE TO FIND 1 tsp. MEDICATION NAME: calcium       UNABLE TO FIND MEDICATION NAME: Centamicin Cream prn       Varenicline Tartrate (CHANTIX PO) Take 1 mg  by mouth 2 times daily as needed        venlafaxine (EFFEXOR-XR) 37.5 MG 24 hr capsule          PAST SURGICAL HISTORY:  Past Surgical History:   Procedure Laterality Date     BIOPSY      [facility]     CHOLECYSTECTOMY  2013     COLONOSCOPY  2016    [facility]     LAPAROSCOPIC INSERTION CATHETER PERITONEAL DIALYSIS N/A 3/16/2016    Procedure: LAPAROSCOPIC INSERTION CATHETER PERITONEAL DIALYSIS;  Surgeon: Winnie Colmenares MD;  Location: [facility] OR     SEPTOPLASTY, TURBINOPLASTY, COMBINED  Nov 2008    inferior turbinate reduction and septoplasty due to deviation to right     TURBINOPLASTY  July 2008    bilateral inferior turbinate reduction     TURBINOPLASTY  1/27/2014    Procedure: TURBINOPLASTY;  Bilateral Turbinate Reduction ;  Surgeon: Cindy Carrasquillo MD;  Location: [facility] OR       ALLERGIES   No Known Allergies    FAMILY HISTORY:  Family History   Problem Relation Age of Onset     Asthma Mother      Diabetes Mother      Hypertension Mother      Cerebrovascular Disease Mother      Cancer Mother      Alcohol/Drug Mother      Heart Disease Mother      Asthma Sister      Asthma Sister      Asthma Sister      Asthma Brother      Skin Cancer Brother      Asthma Brother      Asthma Brother      Heart Disease Sister      Diabetes Sister      Melanoma No family hx of        SOCIAL HISTORY:  Social History     Socioeconomic History     Marital status: Single     Spouse name: Leonela     Number of children: 1     Years of education: None     Highest education level: None   Occupational History     Occupation:      Employer: SELF EMPLOYED.   Social Needs     Financial resource strain: None     Food insecurity:     Worry: None     Inability: None     Transportation needs:     Medical: None     Non-medical: None   Tobacco Use     Smoking status: Former Smoker     Packs/day: 0.50     Years: 20.00     Pack years: 10.00     Types: Cigarettes     Start date: 1/1/1975     Last attempt to quit: 1/1/2016     Years since quitting:  "3.2     Smokeless tobacco: Never Used   Substance and Sexual Activity     Alcohol use: Yes     Comment: 2 drinks/week     Drug use: No     Sexual activity: None   Lifestyle     Physical activity:     Days per week: None     Minutes per session: None     Stress: None   Relationships     Social connections:     Talks on phone: None     Gets together: None     Attends Latter-day service: None     Active member of club or organization: None     Attends meetings of clubs or organizations: None     Relationship status: None     Intimate partner violence:     Fear of current or ex partner: None     Emotionally abused: None     Physically abused: None     Forced sexual activity: None   Other Topics Concern     Parent/sibling w/ CABG, MI or angioplasty before 65F 55M? 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 No weight gain/loss   ENT: No visual disturbance, ear ache, epistaxis, sore throat  Allergies/Immunologic: Negative.   Respiratory: No cough, hemoptysia  Cardiovascular: As per HPI  GI: No nausea, vomiting, hematemesis, melena, or hematochezia  : No urinary frequency, dysuria, or hematuria  Integument: Negative  Psychiatric: Negative  Neuro: Negative  Endocrinology: Negative   Musculoskeletal: Negative    EXAM:  BP (!) 138/91 (BP Location: Right arm, Patient Position: Chair, Cuff Size: Adult Large)   Pulse 77   Ht 1.727 m (5' 8\")   Wt 95.4 kg (210 lb 6.4 oz)   SpO2 98%   BMI 31.99 kg/m     In general, the patient is a pleasant male in no apparent distress.    HEENT: NC/AT.  PERRLA.  EOMI.  Sclerae white, not injected.  Nares clear.  Pharynx without erythema or exudate.  Dentition intact.    Neck: No adenopathy.  No thyromegaly. Carotids +4/4 bilaterally without bruits.  No jugular venous distension.   Heart: RRR. Normal S1, S2 splits physiologically. No murmur, rub, click, or gallop. The PMI is in the 5th ICS in the " midclavicular line. There is no heave.    Lungs: CTA.  No ronchi, wheezes, rales.  No dullness to percussion.   Abdomen: Soft, nontender, nondistended. No organomegaly.  No bruits.   Extremities: No clubbing, cyanosis, or edema.  The pulses are +4/4 at the radial, brachial, femoral, popliteal, DP, and PT sites bilaterally.  No bruits are noted.  Neurologic: Alert and oriented to person/place/time, normal speech, gait and affect  Skin: No petechiae, purpura or rash.    /90 mmHg    Labs:  LIPID RESULTS:  Lab Results   Component Value Date    CHOL 123 08/06/2018    HDL 33 (L) 08/06/2018    LDL 70 08/06/2018    TRIG 103 08/06/2018    CHOLHDLRATIO 8.0 (H) 06/29/2015    NHDL 90 08/06/2018       LIVER ENZYME RESULTS:  Lab Results   Component Value Date    AST PENDING 04/01/2019    ALT PENDING 04/01/2019       CBC RESULTS:  Lab Results   Component Value Date    WBC 7.0 08/06/2018    RBC 3.12 (L) 08/06/2018    HGB 9.8 (L) 08/06/2018    HCT 31.7 (L) 08/06/2018     (H) 08/06/2018    MCH 31.4 08/06/2018    MCHC 30.9 (L) 08/06/2018    RDW 16.0 (H) 08/06/2018     08/06/2018       BMP RESULTS:  Lab Results   Component Value Date     04/01/2019    POTASSIUM 3.8 04/01/2019    CHLORIDE 98 04/01/2019    CO2 PENDING 04/01/2019    ANIONGAP PENDING 04/01/2019    GLC PENDING 04/01/2019    BUN PENDING 04/01/2019    CR PENDING 04/01/2019    GFRESTIMATED PENDING 04/01/2019    GFRESTBLACK PENDING 04/01/2019    BETH PENDING 04/01/2019        A1C RESULTS:  Lab Results   Component Value Date    A1C 5.7 01/06/2016       INR RESULTS:  Lab Results   Component Value Date    INR 1.06 08/06/2018    INR 0.96 04/30/2018       Procedures:    Normal, low-risk dobutamine echocardiogram.  The target heart rate was achieved. Normal heart rate and BP response to  dobutamine.  Normal biventricular size and global systolic function at baseline, LVEF=55-  60%. Severe LVH noted.  With dobutamine, LVEF increased to >70% and LV cavity size  decreased  appropriately.  No regional wall motion abnormalities are present at rest or with dobutamine.  No angina was elicited.  No ECG evidence of ischemia. Baseline ECG shows inferolateral TWI.  No significant valvular abnormalities are noted on screening Doppler exam.  The aortic root and visualized ascending aorta are normal.  _____________________________________________________________________________  __     Stress  The patient did not exhibit any symptoms during drug infusion.  The drug infusion was stopped due to target heart rate achieved.  The maximum dose of dobutamine was 50mcg/kg/min.  The maximum dose of atropine was 1.6mg.  The maximum dose of metoprolol was 4.0mg.  Definity (NDC #39201-608-69) given intravenously.  Patient was given 6ml mixture of 1.5ml Definity and 8.5ml saline.  4 ml wasted.  IV start location L Upper arm .  Definity Expiration 19 .  Definity Lot # 6218 .     Stress Results                                       Maximum Predicted HR:   163 bpm             Target HR: 139 bpm        % Maximum Predicted HR: 84 %                          Stage  DurationHeart Rate  BP   Dose                              (mm:ss)   (bpm)                      Baseline  0:00      78    150/1010.00                        Peak   15:41     137    203/90 50.00                            Stress Duration:   15:41 mm:ss                      Maximum Stress HR: 137 bpm     Mitral Valve  There is trace mitral regurgitation.     Aortic Valve  The aortic valve is trileaflet. There is trace aortic regurgitation.     Pericardium  There is no pericardial effusion.        Procedure  Dobutamine Echo Complete. Contrast Definity.  _____________________________________________________________________________  __           Doppler Measurements & Calculations  TR max lily: 228.0 cm/sec  TR max P.8 mmHg      Assessment and Plan:     We discussed the results with patient.  We discussed the importance of a heart healthy  diet and lifestyle.    We discussed the results of lung CT and calcifications of the coronary arteries - which is common in end-stage renal disease.    Echostress test was normal  Patient can be cleared for renal transplant form cardiovascular point of view.  We continue with same medical regimen.      Please follow up: With Dr. Lloyd in six months with fasting labs prior.    Cody Lloyd MD, PhD  Professor of Medicine  Division of Cardiology    CC  Patient Care Team:  Chris Barraza as PCP - General (Internal Medicine)  Cody Lloyd MD as MD (Cardiology)  Cody Venegas MD as MD (Dermapathology)  Ranjan Boo DPM (Podiatry)  Jaxon Watson MD as MD (Dermatology)  Dayana Castorena MD as MD (Nephrology)  Joseline Rivas LPN as Nurse Coordinator (Cardiology)  SELF, REFERRED

## 2019-04-16 NOTE — TELEPHONE ENCOUNTER
Rommel returned my message.  He states he still has not seen a dentist but is still working on getting an appointment.  He understands that he is to notify his coordinator as soon as this has been accomplished.  I also reviewed case with PFR who reports that finances are in order for him to be listed.

## 2019-04-16 NOTE — TELEPHONE ENCOUNTER
Reviewing Rommel's status regarding transplant evaluation.  It looks like Dr Lloyd has given cardiac clearance 4/1/19.  I sent message to R to review insurance.  I attempted to reach Rommel, left message on his cell phone asking for him to call us back and let us know if he has been to the dentist.

## 2019-04-24 NOTE — COMMITTEE REVIEW
Abdominal Patient Discussion Note Transplant Coordinator: Ana Tucker  Transplant Surgeon:       Referring Physician: Dayana Castorena    Committee Review Members:  Nephrology Cody Carpenter MD, Yadiel Kebede, APRN CNP, Viral Willard Brown MD   Nutrition Annamaria Michelle, RD   Pharmacy Andrea Anthony, [facility]    - Clinical Isabel Sy, MSW, Margarita Santamaria, MSW       Additional Discussion Notes and Findings: redmeghan      Bring him back to commmittee per Dr. Hanson   Bring the neuropsych note with Dr. Armstrong and MIK   Dental work NOT DONE,  review when dental work done.     K idney transplant evaluation - patient is a good candidate overall. Benefits of a living donor transplant were discussed.  -- needs pre screening workup repeated colonoscopy,   DONE low dose CT chest lung cancer screening,   --updated 1.59 8/6/2018   -- cardiology Clearance    2. ESKD from Hypertension on PD dialysis since 2016, no hx of cathter related issue, no hx of peritonitis, recently his dialysis is inadequate and now with uremic symptoms. Likely related to constipation.  -- will follow up with his nephrology for management.     3. Hep C: seen by GI      4. Colon polyp: seen in colonoscopy 2016 tubular adenoma low grade dysplasia and inflammatory polyp.    -- repeated colonoscopy before 2019.      5. Hx of smoking: more than 30 years, quit 2016 low grade CT scan DONE     6. COPD/sleep apnea: on home medications inhaler, use C-pap daily,  updated PFT. 8/6/2018 Normal DONE      7. Depression: on Effexor, no suicide thought.

## 2019-05-13 NOTE — TELEPHONE ENCOUNTER
[facility] Call Center    Phone Message    May a detailed message be left on voicemail: yes    Reason for Call: Other: Pharmacy called in and stated that the Rx for Lidocaine is not covered and they are wondering what you would like to fill it with instead. Please follow up when available. Thank you     Action Taken: Message routed to:  [facility] ([facility] Ortho

## 2019-05-14 NOTE — TELEPHONE ENCOUNTER
Please connect with the pt when available- pt would like to connect with his new coordinator for an update

## 2019-05-16 NOTE — TELEPHONE ENCOUNTER
Rommel Fan called back about the dental exam.      Rommel Fan went to local dentist but they are requiring him to have $800.88 of work.   [address] [place] [address] in [place], [place]  Unsure of the name of the dental office.      Mr. aFn is requesting to go to the dental clinic at the [facility].    I called the [facility] [phone] are willing to assist mr. Fan with appt.  ( Listen until a live person comes on the line)     He chose to call them back.

## 2019-05-20 NOTE — LETTER
May 20, 2019    Rommel Fan   1960  [facility] # [id number]      RE:  Rommel Fan, 1960        You may or may not know that the above mutual patient is in evaluation for a kidney transplant here at the [facility].    One of the requirements of our patients to be in our transplant program is for them to provide a letter from a dentist stating that they are cleared for transplant; meaning they do not currently have any infections or dental conditions that would prevent them from going forward with transplant surgery.     Please provide a signed note at your earliest convenience, on company letterhead, with the dentition status of the above referenced patient; our direct fax number is [phone].    Thank you in advance for your assistance in this matter.        Ana Tucker RN BSN   Kidney Transplant Wait List  [phone] Phone  [phone] Fax  [email or web address]     [facility] Trans[facility]  [facility] [address] [address]

## 2019-05-20 NOTE — TELEPHONE ENCOUNTER
VM from Mr. Fan stating [facility] [phone] needs Dental letter/referral prior to Clinic scheduling his exam.    Ana to fax the [place] dental letter to [phone].    Mr. Fan to call [phone] with name of his previous dental practice and send their report to [place] [facility] as they can only do one dental exam in a calendar year.      Ana called Mr. Fan and told him to recall the Dental clinic and remain on the line to ask for Tiffany in scheduling.     fyi note sent with fax;  Ana Tucker RN BSN   [phone] PWB 2-200 Transplant office

## 2019-06-06 NOTE — TELEPHONE ENCOUNTER
Tiffany called for updates phone numbers on Rommel [facility] as the one they had from 2011 was not in service.   I gave her the three numbers, cell home and emergency contact number from Epic.    She is attempting to reach him for his dental assessment.      Note to JASMIN Soto and Dr. Martin for update on pt that fellow saw 8/2018.

## 2019-07-08 DIAGNOSIS — M79.671 RIGHT FOOT PAIN: ICD-10-CM

## 2019-07-08 RX ORDER — IBUPROFEN 600 MG/1
TABLET, FILM COATED ORAL
Qty: 90 TABLET | Refills: 0 | Status: SHIPPED | OUTPATIENT
Start: 2019-07-08

## 2019-11-04 ENCOUNTER — HEALTH MAINTENANCE LETTER (OUTPATIENT)
Age: 59
End: 2019-11-04

## 2019-11-11 ENCOUNTER — TELEPHONE (OUTPATIENT)
Dept: TRANSPLANT | Facility: CLINIC | Age: 59
End: 2019-11-11

## 2019-11-11 NOTE — TELEPHONE ENCOUNTER
"April selection committee note:    \"Bring him back to commmittee per Dr. Hanson   Bring the neuropsych note with Dr. Armstrong and SW   Dental work NOT DONE,  review when dental work done\".     I left voice message with Mr. Fan on home phone.  I left a voice mail on Mr. Fan's cell phone, if dental is done to have it sent to transplant office.     Sent message to Dr. Martin fellow met him 8/6/2018.   "

## 2020-01-22 ENCOUNTER — POST MORTEM DOCUMENTATION (OUTPATIENT)
Dept: TRANSPLANT | Facility: CLINIC | Age: 60
End: 2020-01-22

## 2020-01-22 ENCOUNTER — TELEPHONE (OUTPATIENT)
Dept: TRANSPLANT | Facility: CLINIC | Age: 60
End: 2020-01-22

## 2020-01-22 NOTE — PROGRESS NOTES
[phone] I called dialysis and learned Mr. Fan  during the summer. Evaluation episode resolved/ ended.  I informed Khushbu Remy .   Place of death unknown .  The Transplant Office has been notified that patient is . The Post Mortem Encounter has been completed. Notifications have been sent to the Care team.   Instructions have been sent to eliminate paper chart.

## 2020-01-22 NOTE — TELEPHONE ENCOUNTER
[phone] I called dialysis and learned Mr. Fan  during the summer.     Evaluation episode resolved/ ended     I informed Khushbu Remy .

## 2021-06-02 ENCOUNTER — RECORDS - HEALTHEAST (OUTPATIENT)
Dept: ADMINISTRATIVE | Facility: CLINIC | Age: 61
End: 2021-06-02

## 2022-01-18 NOTE — TELEPHONE ENCOUNTER
FUTURE VISIT INFORMATION      FUTURE VISIT INFORMATION:    Date: 7/6/18    Time:     [place] [facility] INFORMATION:    Referring provider:  Dr. Sanford Menendez    Referring providers clinic:  [facility]    Reason for visit/diagnosis  Diarrhea    RECORDS STATUS      NOTES STATUS DETAILS   OFFICE NOTE from referring provider Internal    OFFICE NOTE from other specialist N/A    DISCHARGE SUMMARY from hospital N/A    OPERATIVE REPORT N/A    MEDICATION LIST Internal         ENDOSCOPY  N/A    COLONOSCOPY N/A    ERCP N/A    EUS N/A    STOOL TESTING N/A    PERTINENT LABS Internal    PATHOLOGY REPORTS (RELATED) N/A    IMAGING (CT, MRI, EGD) Internal            
17-May-2021

## (undated) RX ORDER — METOPROLOL TARTRATE 1 MG/ML
INJECTION, SOLUTION INTRAVENOUS
Status: DISPENSED
Start: 2019-01-01

## (undated) RX ORDER — ALBUTEROL SULFATE 0.83 MG/ML
SOLUTION RESPIRATORY (INHALATION)
Status: DISPENSED
Start: 2018-01-01

## (undated) RX ORDER — DOBUTAMINE HYDROCHLORIDE 200 MG/100ML
INJECTION INTRAVENOUS
Status: DISPENSED
Start: 2019-01-01

## (undated) RX ORDER — ATROPINE SULFATE 0.4 MG/ML
AMPUL (ML) INJECTION
Status: DISPENSED
Start: 2019-01-01